# Patient Record
Sex: MALE | URBAN - METROPOLITAN AREA
[De-identification: names, ages, dates, MRNs, and addresses within clinical notes are randomized per-mention and may not be internally consistent; named-entity substitution may affect disease eponyms.]

---

## 2017-02-17 ENCOUNTER — EMERGENCY (EMERGENCY)
Facility: HOSPITAL | Age: 77
LOS: 1 days | Discharge: HOME | End: 2017-02-17
Attending: EMERGENCY MEDICINE | Admitting: EMERGENCY MEDICINE
Payer: MEDICARE

## 2017-02-17 VITALS
SYSTOLIC BLOOD PRESSURE: 125 MMHG | HEART RATE: 53 BPM | TEMPERATURE: 98 F | RESPIRATION RATE: 16 BRPM | DIASTOLIC BLOOD PRESSURE: 50 MMHG | OXYGEN SATURATION: 99 %

## 2017-02-17 VITALS
RESPIRATION RATE: 16 BRPM | DIASTOLIC BLOOD PRESSURE: 60 MMHG | OXYGEN SATURATION: 100 % | SYSTOLIC BLOOD PRESSURE: 155 MMHG | HEART RATE: 60 BPM

## 2017-02-17 DIAGNOSIS — Z98.89 OTHER SPECIFIED POSTPROCEDURAL STATES: Chronic | ICD-10-CM

## 2017-02-17 LAB
ALBUMIN SERPL ELPH-MCNC: 3.3 G/DL — SIGNIFICANT CHANGE UP (ref 3.3–5)
ALP SERPL-CCNC: 34 U/L — LOW (ref 40–120)
ALT FLD-CCNC: 6 U/L — SIGNIFICANT CHANGE UP (ref 4–41)
APTT BLD: 32.7 SEC — SIGNIFICANT CHANGE UP (ref 27.5–37.4)
AST SERPL-CCNC: 16 U/L — SIGNIFICANT CHANGE UP (ref 4–40)
BASOPHILS # BLD AUTO: 0.04 K/UL — SIGNIFICANT CHANGE UP (ref 0–0.2)
BASOPHILS NFR BLD AUTO: 0.7 % — SIGNIFICANT CHANGE UP (ref 0–2)
BILIRUB SERPL-MCNC: 0.5 MG/DL — SIGNIFICANT CHANGE UP (ref 0.2–1.2)
BUN SERPL-MCNC: 28 MG/DL — HIGH (ref 7–23)
CALCIUM SERPL-MCNC: 8 MG/DL — LOW (ref 8.4–10.5)
CHLORIDE SERPL-SCNC: 98 MMOL/L — SIGNIFICANT CHANGE UP (ref 98–107)
CO2 SERPL-SCNC: 26 MMOL/L — SIGNIFICANT CHANGE UP (ref 22–31)
CREAT SERPL-MCNC: 5.45 MG/DL — HIGH (ref 0.5–1.3)
EOSINOPHIL # BLD AUTO: 0.21 K/UL — SIGNIFICANT CHANGE UP (ref 0–0.5)
EOSINOPHIL NFR BLD AUTO: 3.5 % — SIGNIFICANT CHANGE UP (ref 0–6)
GLUCOSE SERPL-MCNC: 170 MG/DL — HIGH (ref 70–99)
HCT VFR BLD CALC: 34.3 % — LOW (ref 39–50)
HGB BLD-MCNC: 11 G/DL — LOW (ref 13–17)
IMM GRANULOCYTES NFR BLD AUTO: 0 % — SIGNIFICANT CHANGE UP (ref 0–1.5)
INR BLD: 1.14 — SIGNIFICANT CHANGE UP (ref 0.87–1.18)
LYMPHOCYTES # BLD AUTO: 2.04 K/UL — SIGNIFICANT CHANGE UP (ref 1–3.3)
LYMPHOCYTES # BLD AUTO: 34 % — SIGNIFICANT CHANGE UP (ref 13–44)
MCHC RBC-ENTMCNC: 29.5 PG — SIGNIFICANT CHANGE UP (ref 27–34)
MCHC RBC-ENTMCNC: 32.1 % — SIGNIFICANT CHANGE UP (ref 32–36)
MCV RBC AUTO: 92 FL — SIGNIFICANT CHANGE UP (ref 80–100)
MONOCYTES # BLD AUTO: 0.51 K/UL — SIGNIFICANT CHANGE UP (ref 0–0.9)
MONOCYTES NFR BLD AUTO: 8.5 % — SIGNIFICANT CHANGE UP (ref 2–14)
NEUTROPHILS # BLD AUTO: 3.2 K/UL — SIGNIFICANT CHANGE UP (ref 1.8–7.4)
NEUTROPHILS NFR BLD AUTO: 53.3 % — SIGNIFICANT CHANGE UP (ref 43–77)
PLATELET # BLD AUTO: 100 K/UL — LOW (ref 150–400)
PMV BLD: 10.4 FL — SIGNIFICANT CHANGE UP (ref 7–13)
POTASSIUM SERPL-MCNC: 4.3 MMOL/L — SIGNIFICANT CHANGE UP (ref 3.5–5.3)
POTASSIUM SERPL-SCNC: 4.3 MMOL/L — SIGNIFICANT CHANGE UP (ref 3.5–5.3)
PROT SERPL-MCNC: 6.5 G/DL — SIGNIFICANT CHANGE UP (ref 6–8.3)
PROTHROM AB SERPL-ACNC: 13 SEC — SIGNIFICANT CHANGE UP (ref 10–13.1)
RBC # BLD: 3.73 M/UL — LOW (ref 4.2–5.8)
RBC # FLD: 14 % — SIGNIFICANT CHANGE UP (ref 10.3–14.5)
SODIUM SERPL-SCNC: 140 MMOL/L — SIGNIFICANT CHANGE UP (ref 135–145)
WBC # BLD: 6 K/UL — SIGNIFICANT CHANGE UP (ref 3.8–10.5)
WBC # FLD AUTO: 6 K/UL — SIGNIFICANT CHANGE UP (ref 3.8–10.5)

## 2017-02-17 PROCEDURE — 71010: CPT | Mod: 26

## 2017-02-17 PROCEDURE — 99284 EMERGENCY DEPT VISIT MOD MDM: CPT

## 2017-02-17 PROCEDURE — 74177 CT ABD & PELVIS W/CONTRAST: CPT | Mod: 26

## 2017-02-17 NOTE — ED PROVIDER NOTE - OBJECTIVE STATEMENT
77yo male pmh ESRD on HD (T,TH,Fr), HTN, DM p/w high BP SBP 260s at home, improved after clonidine 0.2mg. C/o vomiting 75yo male pmh CAD, ESRD on HD (T,TH,Fr), HTN, DM p/w high BP SBP 260s at home, improved after clonidine 0.2mg. C/o postprandial emesis x 1 week, cannot tolerate PO. Weight loss, weakness, inability to ambulate x months. Facial swelling, left hand swelling x weeks. Denies fevers, LOC, headache, vision changes, CP, dyspnea, abd pain, diarrhea, dysuria. Last BM 2 days prior.

## 2017-02-17 NOTE — ED ADULT NURSE REASSESSMENT NOTE - NS ED NURSE REASSESS COMMENT FT1
Pt DC'd by MD, in NAD, denies abd pain/n/v or other complaint. Results reviewed w/ pt by MD, advisd for PMD follow up. IVL dc'd intact by RN. S/w set up [facility] transport back to home p/u around 1230. Pt in NAD

## 2017-02-17 NOTE — ED ADULT TRIAGE NOTE - CHIEF COMPLAINT QUOTE
Pt  s/p dialysis yesterday c/o high BP ,  B/L LE, facial swelling and B/L arm swelling.  /58.  Denies chest pain, sob, dizziness

## 2017-02-17 NOTE — ED PROVIDER NOTE - PSH
AVF (arteriovenous fistula)    H/O foot surgery  sec to ankle fracture  S/P cataract extraction    S/P cholecystectomy    S/P coronary artery stent placement

## 2017-02-17 NOTE — ED PROVIDER NOTE - PMH
CAD (coronary artery disease)  s/p 1 stent  DM (diabetes mellitus)    ESRD (end stage renal disease)    HLD (hyperlipidemia)    HTN (hypertension)    PVD (peripheral vascular disease)

## 2017-02-17 NOTE — ED PROVIDER NOTE - PROGRESS NOTE DETAILS
Pending CT. Pt will need to be admitted to Dr. Mendoza for failure to thrive if CT is negative for SBO. Patient reassessed and abd soft w/o tenderness or rebound or gurading.

## 2017-02-17 NOTE — ED PROVIDER NOTE - MEDICAL DECISION MAKING DETAILS
76M pmh DM, HTN, CAD, ESRD on HD p/w weight loss, facial swelling, weakness, high BP now resolved, cachexia, inability to tolerate PO. Ct abd/pelvis IV and PO contrast for possible mass causing SBO (constipation, hx of abd surgery, cachexia). According to Dr. Romano, a nephrologist covering for the patient, pt has been offered palliative but has refused. Labs appear to show hemoconcentration.

## 2017-02-17 NOTE — ED PROVIDER NOTE - ENMT, MLM
Airway patent, Nasal mucosa clear. Mouth with slightly dry mucosa. Throat has no vesicles, no oropharyngeal exudates and uvula is midline.

## 2017-02-17 NOTE — ED ADULT NURSE NOTE - OBJECTIVE STATEMENT
Pt received to room 9, Aox4 and nonambulatory at baseline. Patient c/o high BP this AM in 200's/100's. BP now is 167/61. Patient HR = 53 sinus rhythm on cardiac monitor. Patient is c/o some left sided edema to arm and leg as well, warm dry and no redness seen, 1+ pitting edema. Patient is a dialysis patient, gets dialysis T, TH, Sat. IV 22 gauge started in right wrist, labs drawn and sent, will continue to monitor.

## 2017-09-08 ENCOUNTER — INPATIENT (INPATIENT)
Facility: HOSPITAL | Age: 77
LOS: 3 days | Discharge: HOME | End: 2017-09-12
Attending: HOSPITALIST | Admitting: HOSPITALIST
Payer: MEDICARE

## 2017-09-08 DIAGNOSIS — Z98.89 OTHER SPECIFIED POSTPROCEDURAL STATES: Chronic | ICD-10-CM

## 2017-09-08 PROCEDURE — 70450 CT HEAD/BRAIN W/O DYE: CPT | Mod: 26

## 2017-09-08 PROCEDURE — 99285 EMERGENCY DEPT VISIT HI MDM: CPT | Mod: 25

## 2017-09-09 VITALS
HEART RATE: 57 BPM | OXYGEN SATURATION: 100 % | WEIGHT: 119.49 LBS | SYSTOLIC BLOOD PRESSURE: 103 MMHG | RESPIRATION RATE: 18 BRPM | DIASTOLIC BLOOD PRESSURE: 50 MMHG

## 2017-09-09 LAB
ALBUMIN SERPL ELPH-MCNC: 3.2 G/DL — LOW (ref 3.3–5)
ALBUMIN SERPL ELPH-MCNC: 3.4 G/DL — SIGNIFICANT CHANGE UP (ref 3.3–5)
ALP SERPL-CCNC: 52 U/L — SIGNIFICANT CHANGE UP (ref 40–120)
ALP SERPL-CCNC: 57 U/L — SIGNIFICANT CHANGE UP (ref 40–120)
ALT FLD-CCNC: 14 U/L — SIGNIFICANT CHANGE UP (ref 12–78)
ALT FLD-CCNC: 16 U/L — SIGNIFICANT CHANGE UP (ref 12–78)
ANION GAP SERPL CALC-SCNC: 11 MMOL/L — SIGNIFICANT CHANGE UP (ref 5–17)
ANION GAP SERPL CALC-SCNC: 13 MMOL/L — SIGNIFICANT CHANGE UP (ref 5–17)
APPEARANCE UR: CLEAR — SIGNIFICANT CHANGE UP
APTT BLD: 30.1 SEC — SIGNIFICANT CHANGE UP (ref 27.5–37.4)
AST SERPL-CCNC: 14 U/L — LOW (ref 15–37)
AST SERPL-CCNC: 35 U/L — SIGNIFICANT CHANGE UP (ref 15–37)
BACTERIA # UR AUTO: ABNORMAL
BASOPHILS # BLD AUTO: 0.1 K/UL — SIGNIFICANT CHANGE UP (ref 0–0.2)
BASOPHILS NFR BLD AUTO: 1.4 % — SIGNIFICANT CHANGE UP (ref 0–2)
BILIRUB SERPL-MCNC: 0.4 MG/DL — SIGNIFICANT CHANGE UP (ref 0.2–1.2)
BILIRUB SERPL-MCNC: 0.5 MG/DL — SIGNIFICANT CHANGE UP (ref 0.2–1.2)
BILIRUB UR-MCNC: NEGATIVE — SIGNIFICANT CHANGE UP
BUN SERPL-MCNC: 63 MG/DL — HIGH (ref 7–23)
BUN SERPL-MCNC: 64 MG/DL — HIGH (ref 7–23)
CALCIUM SERPL-MCNC: 7.3 MG/DL — LOW (ref 8.5–10.1)
CALCIUM SERPL-MCNC: 7.7 MG/DL — LOW (ref 8.5–10.1)
CHLORIDE SERPL-SCNC: 103 MMOL/L — SIGNIFICANT CHANGE UP (ref 96–108)
CHLORIDE SERPL-SCNC: 104 MMOL/L — SIGNIFICANT CHANGE UP (ref 96–108)
CK MB BLD-MCNC: 1.5 % — SIGNIFICANT CHANGE UP (ref 0–3.5)
CK MB BLD-MCNC: 3 % — SIGNIFICANT CHANGE UP (ref 0–3.5)
CK MB CFR SERPL CALC: 1.8 NG/ML — SIGNIFICANT CHANGE UP (ref 0.5–3.6)
CK MB CFR SERPL CALC: 2.2 NG/ML — SIGNIFICANT CHANGE UP (ref 0.5–3.6)
CK SERPL-CCNC: 119 U/L — SIGNIFICANT CHANGE UP (ref 26–308)
CK SERPL-CCNC: 73 U/L — SIGNIFICANT CHANGE UP (ref 26–308)
CO2 SERPL-SCNC: 24 MMOL/L — SIGNIFICANT CHANGE UP (ref 22–31)
CO2 SERPL-SCNC: 26 MMOL/L — SIGNIFICANT CHANGE UP (ref 22–31)
COLOR SPEC: YELLOW — SIGNIFICANT CHANGE UP
CREAT SERPL-MCNC: 10.4 MG/DL — HIGH (ref 0.5–1.3)
CREAT SERPL-MCNC: 10.5 MG/DL — HIGH (ref 0.5–1.3)
DIFF PNL FLD: ABNORMAL
EOSINOPHIL # BLD AUTO: 0.2 K/UL — SIGNIFICANT CHANGE UP (ref 0–0.5)
EOSINOPHIL NFR BLD AUTO: 3.1 % — SIGNIFICANT CHANGE UP (ref 0–6)
EPI CELLS # UR: SIGNIFICANT CHANGE UP
GLUCOSE SERPL-MCNC: 194 MG/DL — HIGH (ref 70–99)
GLUCOSE SERPL-MCNC: 317 MG/DL — HIGH (ref 70–99)
GLUCOSE UR QL: 250 MG/DL
HAV IGM SER-ACNC: SIGNIFICANT CHANGE UP
HBV CORE IGM SER-ACNC: SIGNIFICANT CHANGE UP
HBV SURFACE AB SER-ACNC: >1000 MIU/ML — SIGNIFICANT CHANGE UP
HBV SURFACE AG SER-ACNC: SIGNIFICANT CHANGE UP
HCT VFR BLD CALC: 31 % — LOW (ref 39–50)
HCT VFR BLD CALC: 36.2 % — LOW (ref 39–50)
HCV AB S/CO SERPL IA: 0.15 S/CO — SIGNIFICANT CHANGE UP
HCV AB SERPL-IMP: SIGNIFICANT CHANGE UP
HGB BLD-MCNC: 10.7 G/DL — LOW (ref 13–17)
HGB BLD-MCNC: 11.8 G/DL — LOW (ref 13–17)
INR BLD: 1.08 RATIO — SIGNIFICANT CHANGE UP (ref 0.88–1.16)
IRON SATN MFR SERPL: 19 % — SIGNIFICANT CHANGE UP (ref 16–55)
IRON SATN MFR SERPL: 28 UG/DL — LOW (ref 45–165)
KETONES UR-MCNC: NEGATIVE — SIGNIFICANT CHANGE UP
LACTATE SERPL-SCNC: 2.6 MMOL/L — HIGH (ref 0.7–2)
LEUKOCYTE ESTERASE UR-ACNC: ABNORMAL
LYMPHOCYTES # BLD AUTO: 1.6 K/UL — SIGNIFICANT CHANGE UP (ref 1–3.3)
LYMPHOCYTES # BLD AUTO: 24.6 % — SIGNIFICANT CHANGE UP (ref 13–44)
MAGNESIUM SERPL-MCNC: 2.7 MG/DL — HIGH (ref 1.6–2.6)
MCHC RBC-ENTMCNC: 31.7 PG — SIGNIFICANT CHANGE UP (ref 27–34)
MCHC RBC-ENTMCNC: 32.6 GM/DL — SIGNIFICANT CHANGE UP (ref 32–36)
MCHC RBC-ENTMCNC: 32.6 PG — SIGNIFICANT CHANGE UP (ref 27–34)
MCHC RBC-ENTMCNC: 34.6 GM/DL — SIGNIFICANT CHANGE UP (ref 32–36)
MCV RBC AUTO: 94.3 FL — SIGNIFICANT CHANGE UP (ref 80–100)
MCV RBC AUTO: 97.2 FL — SIGNIFICANT CHANGE UP (ref 80–100)
MONOCYTES # BLD AUTO: 0.5 K/UL — SIGNIFICANT CHANGE UP (ref 0–0.9)
MONOCYTES NFR BLD AUTO: 7.7 % — SIGNIFICANT CHANGE UP (ref 2–14)
NEUTROPHILS # BLD AUTO: 4 K/UL — SIGNIFICANT CHANGE UP (ref 1.8–7.4)
NEUTROPHILS NFR BLD AUTO: 63.1 % — SIGNIFICANT CHANGE UP (ref 43–77)
NITRITE UR-MCNC: NEGATIVE — SIGNIFICANT CHANGE UP
PH UR: 9 — HIGH (ref 5–8)
PHOSPHATE SERPL-MCNC: 3.7 MG/DL — SIGNIFICANT CHANGE UP (ref 2.5–4.5)
PLATELET # BLD AUTO: 136 K/UL — LOW (ref 150–400)
PLATELET # BLD AUTO: 136 K/UL — LOW (ref 150–400)
POTASSIUM SERPL-MCNC: 4.5 MMOL/L — SIGNIFICANT CHANGE UP (ref 3.5–5.3)
POTASSIUM SERPL-MCNC: 6.6 MMOL/L — CRITICAL HIGH (ref 3.5–5.3)
POTASSIUM SERPL-SCNC: 4.5 MMOL/L — SIGNIFICANT CHANGE UP (ref 3.5–5.3)
POTASSIUM SERPL-SCNC: 6.6 MMOL/L — CRITICAL HIGH (ref 3.5–5.3)
PROT SERPL-MCNC: 7.2 GM/DL — SIGNIFICANT CHANGE UP (ref 6–8.3)
PROT SERPL-MCNC: 8.1 GM/DL — SIGNIFICANT CHANGE UP (ref 6–8.3)
PROT UR-MCNC: 100 MG/DL
PROTHROM AB SERPL-ACNC: 11.8 SEC — SIGNIFICANT CHANGE UP (ref 9.8–12.7)
RBC # BLD: 3.28 M/UL — LOW (ref 4.2–5.8)
RBC # BLD: 3.72 M/UL — LOW (ref 4.2–5.8)
RBC # FLD: 12.7 % — SIGNIFICANT CHANGE UP (ref 11–15)
RBC # FLD: 12.9 % — SIGNIFICANT CHANGE UP (ref 11–15)
RBC CASTS # UR COMP ASSIST: SIGNIFICANT CHANGE UP /HPF (ref 0–4)
SODIUM SERPL-SCNC: 140 MMOL/L — SIGNIFICANT CHANGE UP (ref 135–145)
SODIUM SERPL-SCNC: 141 MMOL/L — SIGNIFICANT CHANGE UP (ref 135–145)
SP GR SPEC: 1.01 — SIGNIFICANT CHANGE UP (ref 1.01–1.02)
TIBC SERPL-MCNC: 146 UG/DL — LOW (ref 220–430)
TROPONIN I SERPL-MCNC: 0.02 NG/ML — SIGNIFICANT CHANGE UP (ref 0.01–0.04)
TROPONIN I SERPL-MCNC: 0.03 NG/ML — SIGNIFICANT CHANGE UP (ref 0.01–0.04)
UIBC SERPL-MCNC: 118 UG/DL — SIGNIFICANT CHANGE UP (ref 110–370)
UROBILINOGEN FLD QL: NEGATIVE MG/DL — SIGNIFICANT CHANGE UP
WBC # BLD: 6.3 K/UL — SIGNIFICANT CHANGE UP (ref 3.8–10.5)
WBC # BLD: 7.5 K/UL — SIGNIFICANT CHANGE UP (ref 3.8–10.5)
WBC # FLD AUTO: 6.3 K/UL — SIGNIFICANT CHANGE UP (ref 3.8–10.5)
WBC # FLD AUTO: 7.5 K/UL — SIGNIFICANT CHANGE UP (ref 3.8–10.5)
WBC UR QL: SIGNIFICANT CHANGE UP

## 2017-09-09 PROCEDURE — 70450 CT HEAD/BRAIN W/O DYE: CPT | Mod: 26

## 2017-09-09 PROCEDURE — 93010 ELECTROCARDIOGRAM REPORT: CPT

## 2017-09-09 RX ORDER — NICARDIPINE HYDROCHLORIDE 30 MG/1
5 CAPSULE, EXTENDED RELEASE ORAL
Qty: 50 | Refills: 0 | Status: DISCONTINUED | OUTPATIENT
Start: 2017-09-09 | End: 2017-09-09

## 2017-09-09 RX ORDER — NICARDIPINE HYDROCHLORIDE 30 MG/1
5 CAPSULE, EXTENDED RELEASE ORAL
Qty: 40 | Refills: 0 | Status: DISCONTINUED | OUTPATIENT
Start: 2017-09-09 | End: 2017-09-09

## 2017-09-09 RX ORDER — DOCUSATE SODIUM 100 MG
100 CAPSULE ORAL THREE TIMES A DAY
Qty: 0 | Refills: 0 | Status: DISCONTINUED | OUTPATIENT
Start: 2017-09-09 | End: 2017-09-12

## 2017-09-09 RX ORDER — METOPROLOL TARTRATE 50 MG
50 TABLET ORAL
Qty: 0 | Refills: 0 | Status: DISCONTINUED | OUTPATIENT
Start: 2017-09-09 | End: 2017-09-09

## 2017-09-09 RX ORDER — SODIUM CHLORIDE 9 MG/ML
1000 INJECTION, SOLUTION INTRAVENOUS
Qty: 0 | Refills: 0 | Status: DISCONTINUED | OUTPATIENT
Start: 2017-09-09 | End: 2017-09-12

## 2017-09-09 RX ORDER — NIFEDIPINE 30 MG
60 TABLET, EXTENDED RELEASE 24 HR ORAL DAILY
Qty: 0 | Refills: 0 | Status: DISCONTINUED | OUTPATIENT
Start: 2017-09-09 | End: 2017-09-12

## 2017-09-09 RX ORDER — INSULIN LISPRO 100/ML
2 VIAL (ML) SUBCUTANEOUS ONCE
Qty: 0 | Refills: 0 | Status: COMPLETED | OUTPATIENT
Start: 2017-09-09 | End: 2017-09-09

## 2017-09-09 RX ORDER — ALBUMIN HUMAN 25 %
100 VIAL (ML) INTRAVENOUS ONCE
Qty: 0 | Refills: 0 | Status: COMPLETED | OUTPATIENT
Start: 2017-09-09 | End: 2017-09-09

## 2017-09-09 RX ORDER — DEXTROSE 50 % IN WATER 50 %
1 SYRINGE (ML) INTRAVENOUS ONCE
Qty: 0 | Refills: 0 | Status: DISCONTINUED | OUTPATIENT
Start: 2017-09-09 | End: 2017-09-12

## 2017-09-09 RX ORDER — DEXTROSE 50 % IN WATER 50 %
12.5 SYRINGE (ML) INTRAVENOUS ONCE
Qty: 0 | Refills: 0 | Status: DISCONTINUED | OUTPATIENT
Start: 2017-09-09 | End: 2017-09-12

## 2017-09-09 RX ORDER — ACETAMINOPHEN 500 MG
650 TABLET ORAL EVERY 6 HOURS
Qty: 0 | Refills: 0 | Status: DISCONTINUED | OUTPATIENT
Start: 2017-09-09 | End: 2017-09-12

## 2017-09-09 RX ORDER — NIFEDIPINE 30 MG
60 TABLET, EXTENDED RELEASE 24 HR ORAL DAILY
Qty: 0 | Refills: 0 | Status: DISCONTINUED | OUTPATIENT
Start: 2017-09-09 | End: 2017-09-09

## 2017-09-09 RX ORDER — GLUCAGON INJECTION, SOLUTION 0.5 MG/.1ML
1 INJECTION, SOLUTION SUBCUTANEOUS ONCE
Qty: 0 | Refills: 0 | Status: DISCONTINUED | OUTPATIENT
Start: 2017-09-09 | End: 2017-09-12

## 2017-09-09 RX ORDER — METOPROLOL TARTRATE 50 MG
50 TABLET ORAL
Qty: 0 | Refills: 0 | Status: DISCONTINUED | OUTPATIENT
Start: 2017-09-09 | End: 2017-09-10

## 2017-09-09 RX ORDER — DEXTROSE 50 % IN WATER 50 %
25 SYRINGE (ML) INTRAVENOUS ONCE
Qty: 0 | Refills: 0 | Status: DISCONTINUED | OUTPATIENT
Start: 2017-09-09 | End: 2017-09-12

## 2017-09-09 RX ORDER — INSULIN LISPRO 100/ML
VIAL (ML) SUBCUTANEOUS
Qty: 0 | Refills: 0 | Status: DISCONTINUED | OUTPATIENT
Start: 2017-09-09 | End: 2017-09-12

## 2017-09-09 RX ORDER — HYDRALAZINE HCL 50 MG
10 TABLET ORAL EVERY 4 HOURS
Qty: 0 | Refills: 0 | Status: DISCONTINUED | OUTPATIENT
Start: 2017-09-09 | End: 2017-09-12

## 2017-09-09 RX ADMIN — Medication 100 MILLIGRAM(S): at 06:00

## 2017-09-09 RX ADMIN — Medication 10 MILLIGRAM(S): at 06:25

## 2017-09-09 RX ADMIN — Medication 0.1 MILLIGRAM(S): at 03:30

## 2017-09-09 RX ADMIN — Medication 10 MILLIGRAM(S): at 21:46

## 2017-09-09 RX ADMIN — Medication 0.1 MILLIGRAM(S): at 06:00

## 2017-09-09 RX ADMIN — Medication 50 MILLIGRAM(S): at 06:00

## 2017-09-09 RX ADMIN — Medication 50 MILLIGRAM(S): at 17:25

## 2017-09-09 RX ADMIN — Medication 100 MILLIGRAM(S): at 16:23

## 2017-09-09 RX ADMIN — Medication 2 UNIT(S): at 06:25

## 2017-09-09 RX ADMIN — Medication 50 MILLILITER(S): at 14:26

## 2017-09-09 RX ADMIN — Medication 0.1 MILLIGRAM(S): at 17:25

## 2017-09-09 RX ADMIN — Medication 100 MILLIGRAM(S): at 22:38

## 2017-09-09 NOTE — CONSULT NOTE ADULT - SUBJECTIVE AND OBJECTIVE BOX
Patient is a 76y old  Male who presents with a chief complaint of HTN; weakenss  HPI:  Patient with HTN possible CVA vs hemorrhage; hyperkalemia; ESRD on HD      PAST MEDICAL & SURGICAL HISTORY:  ESRD, HTN  FAMILY HISTORY:    No Known Allergies    MEDICATIONS  (STANDING):  insulin lispro Injectable (HumaLOG) 2 Unit(s) SubCutaneous once  insulin lispro (HumaLOG) corrective regimen sliding scale   SubCutaneous three times a day before meals  dextrose 5%. 1000 milliLiter(s) (50 mL/Hr) IV Continuous <Continuous>  dextrose 50% Injectable 12.5 Gram(s) IV Push once  dextrose 50% Injectable 25 Gram(s) IV Push once  dextrose 50% Injectable 25 Gram(s) IV Push once  docusate sodium 100 milliGRAM(s) Oral three times a day  metoprolol 50 milliGRAM(s) Oral two times a day  NIFEdipine XL 60 milliGRAM(s) Oral daily  cloNIDine 0.1 milliGRAM(s) Oral two times a day  cloNIDine 0.1 milliGRAM(s) Oral once  albumin human 25% IVPB 100 milliLiter(s) IV Intermittent once    MEDICATIONS  (PRN):  dextrose Gel 1 Dose(s) Oral once PRN Blood Glucose LESS THAN 70 milliGRAM(s)/deciliter  glucagon  Injectable 1 milliGRAM(s) IntraMuscular once PRN Glucose LESS THAN 70 milligrams/deciliter  hydrALAZINE Injectable 10 milliGRAM(s) IV Push every 4 hours PRN SBP>160    Vital Signs Last 24 Hrs  T(C): 36.6 (09 Sep 2017 12:00), Max: 36.6 (09 Sep 2017 12:00)  T(F): 97.8 (09 Sep 2017 12:00), Max: 97.8 (09 Sep 2017 12:00)  HR: 60 (09 Sep 2017 12:30) (56 - 60)  BP: 105/52 (09 Sep 2017 12:30) (103/50 - 113/50)  BP(mean): 64 (09 Sep 2017 12:30) (64 - 66)  RR: 16 (09 Sep 2017 12:30) (16 - 18)  SpO2: 100% (09 Sep 2017 12:30) (100% - 100%)    CAPILLARY BLOOD GLUCOSE  126 (09 Sep 2017 12:33)        PHYSICAL EXAM:      T(C): 36.6 (09 Sep 2017 12:00), Max: 36.6 (09 Sep 2017 12:00)  HR: 60 (09 Sep 2017 12:30) (56 - 60)  BP: 105/52 (09 Sep 2017 12:30) (103/50 - 113/50)  RR: 16 (09 Sep 2017 12:30) (16 - 18)  SpO2: 100% (09 Sep 2017 12:30) (100% - 100%)  Wt(kg): --  Respiratory: clear anteriorly, decreased BS at bases  Cardiovascular: S1 S2  Gastrointestinal: soft NT ND +BS  Extremities:   1 edema                  141  |  104  |  64<H>  ----------------------------<  194<H>  4.5   |  24  |  10.50<H>    Ca    7.7<L>      09 Sep 2017 02:55  Phos  3.7       Mg     2.7         TPro  7.2  /  Alb  3.2<L>  /  TBili  0.4  /  DBili  x   /  AST  14<L>  /  ALT  14  /  AlkPhos  52                            10.7   7.5   )-----------( 136      ( 09 Sep 2017 02:54 )             31.0     Urinalysis Basic - ( 08 Sep 2017 22:51 )    Color: Yellow / Appearance: Clear / S.015 / pH: x  Gluc: x / Ketone: Negative  / Bili: Negative / Urobili: Negative mg/dL   Blood: x / Protein: 100 mg/dL / Nitrite: Negative   Leuk Esterase: Trace / RBC: 0-2 /HPF / WBC 0-2   Sq Epi: x / Non Sq Epi: x / Bacteria: Few            Assessment and Plan    ESRD, HTN crisis, possible cerebral bleed.  HD for today; will attempt to keep SBP >150 during treatment.  UF as tolerated; slow titration of BP medications.  Will follow.

## 2017-09-10 LAB
ANION GAP SERPL CALC-SCNC: 8 MMOL/L — SIGNIFICANT CHANGE UP (ref 5–17)
BUN SERPL-MCNC: 21 MG/DL — SIGNIFICANT CHANGE UP (ref 7–23)
CALCIUM SERPL-MCNC: 7.5 MG/DL — LOW (ref 8.5–10.1)
CHLORIDE SERPL-SCNC: 108 MMOL/L — SIGNIFICANT CHANGE UP (ref 96–108)
CO2 SERPL-SCNC: 28 MMOL/L — SIGNIFICANT CHANGE UP (ref 22–31)
CREAT SERPL-MCNC: 5.22 MG/DL — HIGH (ref 0.5–1.3)
CULTURE RESULTS: NO GROWTH — SIGNIFICANT CHANGE UP
GLUCOSE SERPL-MCNC: 224 MG/DL — HIGH (ref 70–99)
HCT VFR BLD CALC: 29.9 % — LOW (ref 39–50)
HGB BLD-MCNC: 9.9 G/DL — LOW (ref 13–17)
MCHC RBC-ENTMCNC: 32.6 PG — SIGNIFICANT CHANGE UP (ref 27–34)
MCHC RBC-ENTMCNC: 32.9 GM/DL — SIGNIFICANT CHANGE UP (ref 32–36)
MCV RBC AUTO: 98.9 FL — SIGNIFICANT CHANGE UP (ref 80–100)
PLATELET # BLD AUTO: 115 K/UL — LOW (ref 150–400)
POTASSIUM SERPL-MCNC: 5.6 MMOL/L — HIGH (ref 3.5–5.3)
POTASSIUM SERPL-SCNC: 5.6 MMOL/L — HIGH (ref 3.5–5.3)
RBC # BLD: 3.03 M/UL — LOW (ref 4.2–5.8)
RBC # FLD: 13.5 % — SIGNIFICANT CHANGE UP (ref 11–15)
SODIUM SERPL-SCNC: 144 MMOL/L — SIGNIFICANT CHANGE UP (ref 135–145)
SPECIMEN SOURCE: SIGNIFICANT CHANGE UP
WBC # BLD: 5.7 K/UL — SIGNIFICANT CHANGE UP (ref 3.8–10.5)
WBC # FLD AUTO: 5.7 K/UL — SIGNIFICANT CHANGE UP (ref 3.8–10.5)

## 2017-09-10 RX ORDER — METOPROLOL TARTRATE 50 MG
100 TABLET ORAL EVERY 12 HOURS
Qty: 0 | Refills: 0 | Status: DISCONTINUED | OUTPATIENT
Start: 2017-09-10 | End: 2017-09-12

## 2017-09-10 RX ORDER — HYDRALAZINE HCL 50 MG
50 TABLET ORAL THREE TIMES A DAY
Qty: 0 | Refills: 0 | Status: DISCONTINUED | OUTPATIENT
Start: 2017-09-10 | End: 2017-09-12

## 2017-09-10 RX ADMIN — Medication 10 MILLIGRAM(S): at 09:42

## 2017-09-10 RX ADMIN — Medication 100 MILLIGRAM(S): at 21:39

## 2017-09-10 RX ADMIN — Medication 100 MILLIGRAM(S): at 15:19

## 2017-09-10 RX ADMIN — Medication 2: at 12:16

## 2017-09-10 RX ADMIN — Medication 0.1 MILLIGRAM(S): at 06:56

## 2017-09-10 RX ADMIN — Medication 1 PATCH: at 13:45

## 2017-09-10 RX ADMIN — Medication 60 MILLIGRAM(S): at 06:55

## 2017-09-10 RX ADMIN — Medication 100 MILLIGRAM(S): at 06:56

## 2017-09-10 RX ADMIN — Medication 10 MILLIGRAM(S): at 15:04

## 2017-09-10 RX ADMIN — Medication 50 MILLIGRAM(S): at 16:45

## 2017-09-10 RX ADMIN — Medication 50 MILLIGRAM(S): at 06:55

## 2017-09-10 RX ADMIN — Medication 50 MILLIGRAM(S): at 21:39

## 2017-09-10 RX ADMIN — Medication 2: at 09:36

## 2017-09-10 NOTE — PROGRESS NOTE ADULT - SUBJECTIVE AND OBJECTIVE BOX
Patient seen in follow up for ESRD; comfortable no distress.    MEDICATIONS  (STANDING):  insulin lispro (HumaLOG) corrective regimen sliding scale   SubCutaneous three times a day before meals  dextrose 5%. 1000 milliLiter(s) (50 mL/Hr) IV Continuous <Continuous>  dextrose 50% Injectable 12.5 Gram(s) IV Push once  dextrose 50% Injectable 25 Gram(s) IV Push once  dextrose 50% Injectable 25 Gram(s) IV Push once  docusate sodium 100 milliGRAM(s) Oral three times a day  NIFEdipine XL 60 milliGRAM(s) Oral daily  cloNIDine Patch 0.1 mG/24Hr(s) 1 patch Topical every 7 days  metoprolol 100 milliGRAM(s) Oral every 12 hours  hydrALAZINE 50 milliGRAM(s) Oral three times a day    MEDICATIONS  (PRN):  dextrose Gel 1 Dose(s) Oral once PRN Blood Glucose LESS THAN 70 milliGRAM(s)/deciliter  glucagon  Injectable 1 milliGRAM(s) IntraMuscular once PRN Glucose LESS THAN 70 milligrams/deciliter  hydrALAZINE Injectable 10 milliGRAM(s) IV Push every 4 hours PRN SBP>160  acetaminophen   Tablet 650 milliGRAM(s) Oral every 6 hours PRN For Temp greater than 38.5 C (101.3 F)    PHYSICAL EXAM:      T(C): 36.8 (09-10-17 @ 17:50), Max: 36.8 (09-10-17 @ 17:50)  HR: 64 (09-10-17 @ 18:30) (56 - 71)  BP: 116/53 (09-10-17 @ 18:30) (116/53 - 207/61)  RR: 16 (09-10-17 @ 18:30) (9 - 21)  SpO2: 100% (09-10-17 @ 18:30) (95% - 100%)  Wt(kg): --  Respiratory: clear anteriorly, decreased BS at bases  Cardiovascular: S1 S2  Gastrointestinal: soft NT ND +BS  Extremities:  tr edema  AVF LUE                                  9.9    5.7   )-----------( 115      ( 10 Sep 2017 03:55 )             29.9     09-10    144  |  108  |  21  ----------------------------<  224<H>  5.6<H>   |  28  |  5.22<H>    Ca    7.5<L>      10 Sep 2017 03:55  Phos  3.7     09-09  Mg     2.7     09-09    TPro  7.2  /  Alb  3.2<L>  /  TBili  0.4  /  DBili  x   /  AST  14<L>  /  ALT  14  /  AlkPhos  52  09-09      LIVER FUNCTIONS - ( 09 Sep 2017 02:55 )  Alb: 3.2 g/dL / Pro: 7.2 gm/dL / ALK PHOS: 52 U/L / ALT: 14 U/L / AST: 14 U/L / GGT: x             Assessment and Plan:  HTN crisis with ICH; ESRD  BP improved; Catapress patch added;  HD for Tuesday.  Will follow.

## 2017-09-10 NOTE — CONSULT NOTE ADULT - SUBJECTIVE AND OBJECTIVE BOX
Subjective Complaints:  Historian:       Consult requested by ER doctor:                  Attending:     HPI:  see paper H& P (10 Sep 2017 15:03)    MONTY GOSS    PAST MEDICAL & SURGICAL HISTORY:  76yMale    MEDICATIONS  (STANDING):  insulin lispro (HumaLOG) corrective regimen sliding scale   SubCutaneous three times a day before meals  dextrose 5%. 1000 milliLiter(s) (50 mL/Hr) IV Continuous <Continuous>  dextrose 50% Injectable 12.5 Gram(s) IV Push once  dextrose 50% Injectable 25 Gram(s) IV Push once  dextrose 50% Injectable 25 Gram(s) IV Push once  docusate sodium 100 milliGRAM(s) Oral three times a day  NIFEdipine XL 60 milliGRAM(s) Oral daily  cloNIDine Patch 0.1 mG/24Hr(s) 1 patch Topical every 7 days  metoprolol 100 milliGRAM(s) Oral every 12 hours  hydrALAZINE 50 milliGRAM(s) Oral three times a day    MEDICATIONS  (PRN):  dextrose Gel 1 Dose(s) Oral once PRN Blood Glucose LESS THAN 70 milliGRAM(s)/deciliter  glucagon  Injectable 1 milliGRAM(s) IntraMuscular once PRN Glucose LESS THAN 70 milligrams/deciliter  hydrALAZINE Injectable 10 milliGRAM(s) IV Push every 4 hours PRN SBP>160  acetaminophen   Tablet 650 milliGRAM(s) Oral every 6 hours PRN For Temp greater than 38.5 C (101.3 F)      Allergies    No Known Allergies    Intolerances      FAMILY HISTORY:    Vital Signs Last 24 Hrs  T(C): 36.6 (10 Sep 2017 07:03), Max: 37.7 (09 Sep 2017 19:13)  T(F): 97.9 (10 Sep 2017 07:03), Max: 99.8 (09 Sep 2017 19:13)  HR: 63 (10 Sep 2017 15:00) (56 - 71)  BP: 166/61 (10 Sep 2017 15:00) (150/59 - 206/68)  BP(mean): 90 (10 Sep 2017 15:00) (76 - 145)  RR: 10 (10 Sep 2017 15:00) (9 - 21)  SpO2: 100% (10 Sep 2017 15:00) (99% - 100%)    NEUROLOGICAL EXAM:  HENT:  Normocephalic head; atraumatic head.  Neck supple.  ENT: normal looking.  Mental State:    Alert.  Fully oriented to person, place and date.  Coherent.  Speech clear and intact.  Cooperative.  Responds appropriately.    Cranial Nerves:  II-XII:   Pupils round and reactive to light and accommodation.  Extraocular movements full.  Visual fields full (no homonymous hemianopsia).  Visual acuity wnl.  Facial symmetry intact.  Tongue midline.  Motor Functions:  Moves all extremities.  No pronator drift of UE.  Claps hand well.  Hand  intact bilaterally.  Ambulatory.    Sensory Functions:   Intact to touch and pinprick to face and extremities.    Reflexes:  Deep tendon reflexes normoactive to biceps, knees and ankles.  Babinski absent (present).  Cerebellar Testing:    Finger to nose intact.  Nystagmus absent.  Neurovascular: Carotid auscultation full without bruits.      LABS:                        9.9    5.7   )-----------( 115      ( 10 Sep 2017 03:55 )             29.9     09-10    144  |  108  |  21  ----------------------------<  224<H>  5.6<H>   |  28  |  5.22<H>    Ca    7.5<L>      10 Sep 2017 03:55  Phos  3.7       Mg     2.7         TPro  7.2  /  Alb  3.2<L>  /  TBili  0.4  /  DBili  x   /  AST  14<L>  /  ALT  14  /  AlkPhos  52      PT/INR - ( 08 Sep 2017 22:50 )   PT: 11.8 sec;   INR: 1.08 ratio         PTT - ( 08 Sep 2017 22:50 )  PTT:30.1 sec    Urinalysis Basic - ( 08 Sep 2017 22:51 )    Color: Yellow / Appearance: Clear / S.015 / pH: x  Gluc: x / Ketone: Negative  / Bili: Negative / Urobili: Negative mg/dL   Blood: x / Protein: 100 mg/dL / Nitrite: Negative   Leuk Esterase: Trace / RBC: 0-2 /HPF / WBC 0-2   Sq Epi: x / Non Sq Epi: x / Bacteria: Few        RADIOLOGY & ADDITIONAL STUDIES:    Diet, Consistent Carbohydrate Renal w/Evening Snack ( @ 17:23)  Chart Check ( @ 19:14)  Complete Blood Count: Routine (09-10 @ 03:33)  Basic Metabolic Panel: Routine (09-10 @ 03:33)  cloNIDine Patch 0.1 mG/24Hr(s): [Known as CATAPRES-TTS 1]  1 patch, Topical, every 7 days  Administration Instructions: external use only  This is a Look-alike/Sound-alike Medication  Provider's Contact #: [phone] (09-10 @ 11:01)  metoprolol: [Ordered as LOPRESSOR]  100 milliGRAM(s), Oral, every 12 hours  Provider's Contact #: [phone] (09-10 @ 13:27)  Transfer in Level of Care and or Service:     Transfer to Service: Telemetry    Additional Instructions:  d/w Dr. Hernandez (09-10 @ 14:32)  Transfer in Physician:     Transfer to Service: Telemetry    Transfer to Physician: Jung (09-10 @ 14:32)  Vital Signs:     Frequency:  every 4 hours (09-10 @ 14:32)  VTE Prophylaxis - No Pharmacological Prophylaxis Due To::     Time/Priority:  Routine    Reason For No Pharmacologic VTE Prophylaxis  History of cerebral (09-10 @ :33)  Intermittent Pneumatic Compression:     Body Side:  Bilateral (09-10 @ :33)  hydrALAZINE: [Known as APRESOLINE]  50 milliGRAM(s), Oral, three times a day  Special Instructions: hold for sbp < 100 or HR < 60  Administration Instructions: This is a Look-alike/Sound-alike Medication  Provider's Contact #: [phone] (09-10 @ 15:01)      Assessment & Opinion:    Recommendations:  Brain MRI.  Carotid doppler.  Echocardiogram.  EEG.   DVT prophylaxis as ordered.  Medications: HPI:    MONTY GOSS.  77 yo male..  HPI:  HTN ESRD a/w near syncope found to have a 6 mm L frontal ICH and SBPs of 260, initially on cardene gtt, repeat head Ct with stable 6mm ICH.  Mental status is unchanged.    Additional ER Note:  77 y/o male on 17 was in recliner, family noticed he stopped talking, was diaphoretic . Eyes open but unresponsive. Called 911. Patient awake when in the ambulence. PMHX: HTN ESRD. Patient was on HD three times week but ws not compliant as he wanted to start to go only twice weekly.  In ER found to have a 6 mm L frontal ICH and SBPs of 260, initially on Cardene gtt - now dc'd, repeat head Ct with stable 6mm ICH. mental status is unchanged.  Seen by neurology Dr. Kohler.  PT/OT ordered.  Nephology: Dr. Collado. Patient had HD on 17.  Blood pressure stabilized on clonidine, nifedipine lopressor and hydralazine.  EEG done this am.  Patient stable for transfer to telemetry.     Radiology:  Brain CT:  6 mm hyperdensity along the left frontal subcortical region may represent a microhemorrhage without associated edema. No prior head CTs are available for comparison. Follow-up recommended.  Left mastoid effusion. Swelling along the left ear and scalp, question otitis externa and mastoiditis.    MEDICATIONS  (STANDING):  insulin lispro (HumaLOG) corrective regimen sliding scale   SubCutaneous three times a day before meals  dextrose 5%. 1000 milliLiter(s) (50 mL/Hr) IV Continuous <Continuous>  dextrose 50% Injectable 12.5 Gram(s) IV Push once  dextrose 50% Injectable 25 Gram(s) IV Push once  dextrose 50% Injectable 25 Gram(s) IV Push once  docusate sodium 100 milliGRAM(s) Oral three times a day  NIFEdipine XL 60 milliGRAM(s) Oral daily  cloNIDine Patch 0.1 mG/24Hr(s) 1 patch Topical every 7 days  metoprolol 100 milliGRAM(s) Oral every 12 hours  hydrALAZINE 50 milliGRAM(s) Oral three times a day    MEDICATIONS  (PRN):  dextrose Gel 1 Dose(s) Oral once PRN Blood Glucose LESS THAN 70 milliGRAM(s)/deciliter  glucagon  Injectable 1 milliGRAM(s) IntraMuscular once PRN Glucose LESS THAN 70 milligrams/deciliter  hydrALAZINE Injectable 10 milliGRAM(s) IV Push every 4 hours PRN SBP>160  acetaminophen   Tablet 650 milliGRAM(s) Oral every 6 hours PRN For Temp greater than 38.5 C (101.3 F)  Allergies: No Known Allergies  Intolerances  FAMILY HISTORY:    Vital Signs Last 24 Hrs  T(C): 36.6 (10 Sep 2017 07:03), Max: 37.7 (09 Sep 2017 19:13)  T(F): 97.9 (10 Sep 2017 07:03), Max: 99.8 (09 Sep 2017 19:13)  HR: 63 (10 Sep 2017 15:00) (56 - 71)  BP: 166/61 (10 Sep 2017 15:00) (150/59 - 206/68)  BP(mean): 90 (10 Sep 2017 15:00) (76 - 145)  RR: 10 (10 Sep 2017 15:00) (9 - 21)  SpO2: 100% (10 Sep 2017 15:00) (99% - 100%)    NEUROLOGICAL EXAM:  HENT:  Normocephalic head; atraumatic head.  Neck supple.  ENT: normal looking.  Mental State:    Awake.  Fully oriented to person, place and date.  Coherent.  Speech clear and intact.  Cooperative.  Responds appropriately.    Cranial Nerves:  II-XII:   Pupils round and reactive to light and accommodation.  Extraocular movements full.  Visual fields full (no homonymous hemianopsia).  Visual acuity wnl.  Facial symmetry intact.  Tongue midline.  Motor Functions:  Moves all extremities.  No pronator drift of UE.  Claps hands well.    Sensory Functions:   Intact to touch and pinprick to face and extremities.    Reflexes:  Deep tendon reflexes normoactive to knees and ankles.    Cerebellar Testing:    Finger to nose intact.  Nystagmus absent.  Neurovascular: Carotid auscultation full without bruits.      LABS:                        9.9    5.7   )-----------( 115      ( 10 Sep 2017 03:55 )             29.9     09-10    144  |  108  |  21  ----------------------------<  224<H>  5.6<H>   |  28  |  5.22<H>    Ca    7.5<L>      10 Sep 2017 03:55  Phos  3.7       Mg     2.7         TPro  7.2  /  Alb  3.2<L>  /  TBili  0.4  /  DBili  x   /  AST  14<L>  /  ALT  14  /  AlkPhos  52      PT/INR - ( 08 Sep 2017 22:50 )   PT: 11.8 sec;   INR: 1.08 ratio       PTT - ( 08 Sep 2017 22:50 )  PTT:30.1 sec    Urinalysis Basic - ( 08 Sep 2017 22:51 )    Color: Yellow / Appearance: Clear / S.015 / pH: x  Gluc: x / Ketone: Negative  / Bili: Negative / Urobili: Negative mg/dL   Blood: x / Protein: 100 mg/dL / Nitrite: Negative   Leuk Esterase: Trace / RBC: 0-2 /HPF / WBC 0-2   Sq Epi: x / Non Sq Epi: x / Bacteria: Few    RADIOLOGY & ADDITIONAL STUDIES:    Diet, Consistent Carbohydrate Renal w/Evening Snack ( @ 17:23)  Chart Check ( @ 19:14)  Complete Blood Count: Routine (09-10 @ 03:33)  Basic Metabolic Panel: Routine (09-10 @ 03:33)  cloNIDine Patch 0.1 mG/24Hr(s): [Known as CATAPRES-TTS 1]  1 patch, Topical, every 7 days  Administration Instructions: external use only  This is a Look-alike/Sound-alike Medication  Provider's Contact #: [phone] (09-10 @ 11:01)  metoprolol: [Ordered as LOPRESSOR]  100 milliGRAM(s), Oral, every 12 hours  Provider's Contact #: [phone] (09-10 @ 13:27)  Transfer in Level of Care and or Service:     Transfer to Service: Telemetry    Additional Instructions:  d/w Dr. Hernandez (09-10 @ 14:32)  Transfer in Physician:     Transfer to Service: Telemetry    Transfer to Physician: Jung (09-10 @ 14:32)  Vital Signs:     Frequency:  every 4 hours (09-10 @ 14:32)  VTE Prophylaxis - No Pharmacological Prophylaxis Due To::     Time/Priority:  Routine    Reason For No Pharmacologic VTE Prophylaxis  History of cerebral (09-10 @ 14:33)  Intermittent Pneumatic Compression:     Body Side:  Bilateral (09-10 @ 14:33)  hydrALAZINE: [Known as APRESOLINE]  50 milliGRAM(s), Oral, three times a day  Special Instructions: hold for sbp < 100 or HR < 60  Administration Instructions: This is a Look-alike/Sound-alike Medication  Provider's Contact #: [phone] (09-10 @ 15:01)    Assessment & Opinion:  Known ESRD on Hemodialysis withpossible Syncope  Recommendations:  Brain MRI.  Carotid doppler.  Echocardiogram.  EEG.   DVT prophylaxis as ordered.

## 2017-09-10 NOTE — CHART NOTE - NSCHARTNOTEFT_GEN_A_CORE
[facility] NP NOTE    HPI  75 y/o male on 9/8/17 was in recliner, family noticed he stopped talking, was diaphoretic . Eyes open but unresponsive. Called 911. Patient awake when in the ambulence. PMHX: HTN ESRD. Patient was on HD three times week but ws not compliant as he wanted to start to go only twice weekly.  In ER found to have a 6 mm L frontal ICH and SBPs of 260, initially on Cardene gtt, repeat head Ct with stable 6mm ICH. mental status is unchanged.  Seen by neurology Dr. Kohler.  Nephology Dr. Collado. [facility] NP NOTE    HPI  77 y/o male on 9/8/17 was in recliner, family noticed he stopped talking, was diaphoretic . Eyes open but unresponsive. Called 911. Patient awake when in the ambulence. PMHX: HTN ESRD. Patient was on HD three times week but ws not compliant as he wanted to start to go only twice weekly.  In ER found to have a 6 mm L frontal ICH and SBPs of 260, initially on Cardene gtt - now dc'd, repeat head Ct with stable 6mm ICH. mental status is unchanged.  Seen by neurology Dr. Kohler.  PT/OT ordered.  Nephology: Dr. Collado. Patient had HD on 9/9/17.  Blood pressure stabilized on clonidine, nifedipine lopressor and hydralazine.  EEG done this am.  Patient stable for transfer to telemetry.     Report given to Dr. Reed - [facility] service     SHREYA Christian  critical care

## 2017-09-10 NOTE — PROGRESS NOTE ADULT - SUBJECTIVE AND OBJECTIVE BOX
INTERVAL HPI/OVERNIGHT EVENTS:   HPI:  HTN ESRD a/w near syncope found to have a 6 mm L frontal ICH and SBPs of 260, initially on cardene gtt, repeat head Ct with stable 6mm ICH. mental status is unchanged         PAST MEDICAL & SURGICAL HISTORY:      REVIEW OF SYSTEMS:  No HA, no dizziness, No syncope, No vision changes      ICU Vital Signs Last 24 Hrs  T(C): 36.6 (10 Sep 2017 07:03), Max: 37.7 (09 Sep 2017 19:13)  T(F): 97.9 (10 Sep 2017 07:03), Max: 99.8 (09 Sep 2017 19:13)  HR: 60 (10 Sep 2017 11:30) (55 - 71)  BP: 168/63 (10 Sep 2017 11:00) (133/49 - 206/68)  BP(mean): 91 (10 Sep 2017 11:00) (65 - 145)  ABP: --  ABP(mean): --  RR: 14 (10 Sep 2017 11:00) (9 - 21)  SpO2: 99% (10 Sep 2017 11:30) (99% - 100%)          I&O's Detail    09 Sep 2017 07:01  -  10 Sep 2017 07:00  --------------------------------------------------------  IN:    IV PiggyBack: 100 mL  Total IN: 100 mL    OUT:  Total OUT: 0 mL    Total NET: 100 mL            CAPILLARY BLOOD GLUCOSE  160 (10 Sep 2017 07:03)  119 (09 Sep 2017 16:30)        PHYSICAL EXAM:     GEn AAOx3 NAD,   Neuro no focal deficits  HEENt No JVD  Lungs CTA B/L  CVS S1, S2 RR  ABD NT/ND  Ext No edema      LABS:                        9.9    5.7   )-----------( 115      ( 10 Sep 2017 03:55 )             29.9      09-10    144  |  108  |  21  ----------------------------<  224<H>  5.6<H>   |  28  |  5.22<H>    Ca    7.5<L>      10 Sep 2017 03:55  Phos  3.7     09-09  Mg     2.7         TPro  7.2  /  Alb  3.2<L>  /  TBili  0.4  /  DBili  x   /  AST  14<L>  /  ALT  14  /  AlkPhos  52      PT/INR - ( 08 Sep 2017 22:50 )   PT: 11.8 sec;   INR: 1.08 ratio         PTT - ( 08 Sep 2017 22:50 )  PTT:30.1 sec  Urinalysis Basic - ( 08 Sep 2017 22:51 )    Color: Yellow / Appearance: Clear / S.015 / pH: x  Gluc: x / Ketone: Negative  / Bili: Negative / Urobili: Negative mg/dL   Blood: x / Protein: 100 mg/dL / Nitrite: Negative   Leuk Esterase: Trace / RBC: 0-2 /HPF / WBC 0-2   Sq Epi: x / Non Sq Epi: x / Bacteria: Few          RADIOLOGY & ADDITIONAL STUDIES: Repeat CHead Ct stable 6 mm R frontal ICH      Assessment and Plan:    CRITICAL CARE TIME SPENT:

## 2017-09-10 NOTE — PROGRESS NOTE ADULT - ASSESSMENT
ESRD/ HTN emergency with SBP 260s  Goal -150 systolic  Will start clonidine patch 0.1  increase metoprolol to 100 BId, c/w procardia  60. neuro exam is stable and reassuring  HAd HD yesterday  Possible transfer out in AM.  CCM time 56 min

## 2017-09-11 DIAGNOSIS — N18.6 END STAGE RENAL DISEASE: ICD-10-CM

## 2017-09-11 DIAGNOSIS — I16.1 HYPERTENSIVE EMERGENCY: ICD-10-CM

## 2017-09-11 DIAGNOSIS — R55 SYNCOPE AND COLLAPSE: ICD-10-CM

## 2017-09-11 LAB
ANION GAP SERPL CALC-SCNC: 9 MMOL/L — SIGNIFICANT CHANGE UP (ref 5–17)
BUN SERPL-MCNC: 32 MG/DL — HIGH (ref 7–23)
CALCIUM SERPL-MCNC: 7.8 MG/DL — LOW (ref 8.5–10.1)
CHLORIDE SERPL-SCNC: 105 MMOL/L — SIGNIFICANT CHANGE UP (ref 96–108)
CHOLEST SERPL-MCNC: 124 MG/DL — SIGNIFICANT CHANGE UP (ref 10–199)
CO2 SERPL-SCNC: 26 MMOL/L — SIGNIFICANT CHANGE UP (ref 22–31)
CREAT SERPL-MCNC: 7.15 MG/DL — HIGH (ref 0.5–1.3)
GLUCOSE SERPL-MCNC: 158 MG/DL — HIGH (ref 70–99)
HBA1C BLD-MCNC: 6.4 % — HIGH (ref 4–5.6)
HDLC SERPL-MCNC: 23 MG/DL — LOW (ref 40–125)
LIPID PNL WITH DIRECT LDL SERPL: 74 MG/DL — SIGNIFICANT CHANGE UP
POTASSIUM SERPL-MCNC: 6.3 MMOL/L — CRITICAL HIGH (ref 3.5–5.3)
POTASSIUM SERPL-SCNC: 6.3 MMOL/L — CRITICAL HIGH (ref 3.5–5.3)
SODIUM SERPL-SCNC: 140 MMOL/L — SIGNIFICANT CHANGE UP (ref 135–145)
TOTAL CHOLESTEROL/HDL RATIO MEASUREMENT: 5.4 RATIO — SIGNIFICANT CHANGE UP (ref 3.4–9.6)
TRIGL SERPL-MCNC: 135 MG/DL — SIGNIFICANT CHANGE UP (ref 10–149)

## 2017-09-11 PROCEDURE — 99233 SBSQ HOSP IP/OBS HIGH 50: CPT

## 2017-09-11 PROCEDURE — 70551 MRI BRAIN STEM W/O DYE: CPT | Mod: 26

## 2017-09-11 RX ORDER — LACTULOSE 10 G/15ML
10 SOLUTION ORAL ONCE
Qty: 0 | Refills: 0 | Status: COMPLETED | OUTPATIENT
Start: 2017-09-11 | End: 2017-09-11

## 2017-09-11 RX ADMIN — Medication 100 MILLIGRAM(S): at 22:04

## 2017-09-11 RX ADMIN — Medication 50 MILLIGRAM(S): at 13:52

## 2017-09-11 RX ADMIN — Medication 2: at 11:52

## 2017-09-11 RX ADMIN — Medication 50 MILLIGRAM(S): at 22:07

## 2017-09-11 RX ADMIN — Medication 2: at 17:02

## 2017-09-11 RX ADMIN — Medication 100 MILLIGRAM(S): at 05:09

## 2017-09-11 RX ADMIN — Medication 100 MILLIGRAM(S): at 05:08

## 2017-09-11 RX ADMIN — Medication 60 MILLIGRAM(S): at 05:08

## 2017-09-11 RX ADMIN — Medication 100 MILLIGRAM(S): at 17:11

## 2017-09-11 RX ADMIN — LACTULOSE 10 GRAM(S): 10 SOLUTION ORAL at 22:04

## 2017-09-11 RX ADMIN — Medication 100 MILLIGRAM(S): at 13:52

## 2017-09-11 RX ADMIN — Medication 50 MILLIGRAM(S): at 05:08

## 2017-09-11 NOTE — PROVIDER CONTACT NOTE (CRITICAL VALUE NOTIFICATION) - ACTION/TREATMENT ORDERED:
Put out a call to Dr Reed at 1340. Awaiting call back Put out a call to Dr Reed at 1340. Awaiting call back.

## 2017-09-11 NOTE — DIETITIAN INITIAL EVALUATION ADULT. - PHYSICAL APPEARANCE
underweight/BMI = 21.8, no edema noted (9/11) 9/10- 1+- generalized. Nutrition focused physical exam conducted ; found signs of malnutrition [x ]absent [ ]present.  Subcutaneous fat loss: [ ] Orbital fat pads region, [ ]Buccal fat region, [ ]Triceps region,  [ ]Ribs region.  Muscle wasting: [ ]Temples region, [ ]Clavicle region, [ ]Shoulder region, [ ]Scapula region, [ ]Interosseous region,  [ ]thigh region, [ ]Calf region

## 2017-09-11 NOTE — DIETITIAN INITIAL EVALUATION ADULT. - PERTINENT LABORATORY DATA
09-10 Na144 mmol/L Glu 224 mg/dL<H> K+ 5.6 mmol/L<H> Cr  5.22 mg/dL<H> BUN 21 mg/dL Phos n/a   Alb n/a   PAB n/a  GFR -11,  FS - 7:53 - 136

## 2017-09-11 NOTE — DIETITIAN INITIAL EVALUATION ADULT. - NS AS NUTRI INTERV MEDICAL AND FOOD SUPPLEMENTS
Commercial food/Commercial beverage/Recommend: Nepro w/ Carb steady 1x/day = 425 kcal & 19.1 g pro & 1 pkg No carb Prosource daily = 15 g pro & 60 kcal

## 2017-09-11 NOTE — PROGRESS NOTE ADULT - ASSESSMENT
77 y/o male on 9/8/17 was in recliner, family noticed he stopped talking, was diaphoretic . Eyes open but unresponsive. Called 911. Patient awake when in the ambulence. PMHX: HTN ESRD. Patient was on HD three times week but ws not compliant as he wanted to start to go only twice weekly.  In ER found to have a 6 mm L frontal ICH and SBPs of 260, initially on Cardene gtt - now dc'd, repeat head Ct with stable 6mm ICH. mental status is unchanged.  Seen by neurology Dr. Kohler.  PT/OT ordered.  Nephology: Dr. Collado. Patient had HD on 9/9/17.  Blood pressure stabilized on clonidine, nifedipine lopressor and hydralazine.

## 2017-09-11 NOTE — PROGRESS NOTE ADULT - PROBLEM SELECTOR PLAN 1
MRI/EEG/ECHO with no acute changes  Follow up Carotid duplex  Neuro f/u and clearance for d/c to [facility]  No ich noted on MRI

## 2017-09-11 NOTE — DIETITIAN INITIAL EVALUATION ADULT. - ETIOLOGY
Food and nutrition knowledge deficit concerning appropriate amount and type of dietary fat/protein Food and nutrition related knowledge deficit concerning how to make changes

## 2017-09-11 NOTE — PROGRESS NOTE ADULT - SUBJECTIVE AND OBJECTIVE BOX
Patient seen in follow up for ESRD; HTN; appears comfortable.    MEDICATIONS  (STANDING):  insulin lispro (HumaLOG) corrective regimen sliding scale   SubCutaneous three times a day before meals  dextrose 5%. 1000 milliLiter(s) (50 mL/Hr) IV Continuous <Continuous>  dextrose 50% Injectable 12.5 Gram(s) IV Push once  dextrose 50% Injectable 25 Gram(s) IV Push once  dextrose 50% Injectable 25 Gram(s) IV Push once  docusate sodium 100 milliGRAM(s) Oral three times a day  NIFEdipine XL 60 milliGRAM(s) Oral daily  cloNIDine Patch 0.1 mG/24Hr(s) 1 patch Topical every 7 days  metoprolol 100 milliGRAM(s) Oral every 12 hours  hydrALAZINE 50 milliGRAM(s) Oral three times a day    MEDICATIONS  (PRN):  dextrose Gel 1 Dose(s) Oral once PRN Blood Glucose LESS THAN 70 milliGRAM(s)/deciliter  glucagon  Injectable 1 milliGRAM(s) IntraMuscular once PRN Glucose LESS THAN 70 milligrams/deciliter  hydrALAZINE Injectable 10 milliGRAM(s) IV Push every 4 hours PRN SBP>160  acetaminophen   Tablet 650 milliGRAM(s) Oral every 6 hours PRN For Temp greater than 38.5 C (101.3 F)    PHYSICAL EXAM:      T(C): 36.8 (09-11-17 @ 11:58), Max: 36.8 (09-10-17 @ 17:50)  HR: 121 (09-11-17 @ 11:58) (62 - 121)  BP: 150/70 (09-11-17 @ 11:58) (116/53 - 169/62)  RR: 16 (09-11-17 @ 11:58) (10 - 19)  SpO2: 95% (09-11-17 @ 11:58) (95% - 100%)  Wt(kg): --  Respiratory: clear anteriorly, decreased BS at bases  Cardiovascular: S1 S2  Gastrointestinal: soft NT ND +BS  Extremities:   1 edema                                    9.9    5.7   )-----------( 115      ( 10 Sep 2017 03:55 )             29.9     09-11    140  |  105  |  32<H>  ----------------------------<  158<H>  6.3<HH>   |  26  |  7.15<H>    Ca    7.8<L>      11 Sep 2017 12:38            Assessment and Plan:  ESRD; ICH; MAP improving.  HD for tomorrow.

## 2017-09-11 NOTE — PROGRESS NOTE ADULT - SUBJECTIVE AND OBJECTIVE BOX
Patient is a 76y old  Male who presents with a chief complaint of syncopal ep    INTERVAL HPI/OVERNIGHT EVENTS: no events overnight     MEDICATIONS  (STANDING):  insulin lispro (HumaLOG) corrective regimen sliding scale   SubCutaneous three times a day before meals  dextrose 5%. 1000 milliLiter(s) (50 mL/Hr) IV Continuous <Continuous>  dextrose 50% Injectable 12.5 Gram(s) IV Push once  dextrose 50% Injectable 25 Gram(s) IV Push once  dextrose 50% Injectable 25 Gram(s) IV Push once  docusate sodium 100 milliGRAM(s) Oral three times a day  NIFEdipine XL 60 milliGRAM(s) Oral daily  cloNIDine Patch 0.1 mG/24Hr(s) 1 patch Topical every 7 days  metoprolol 100 milliGRAM(s) Oral every 12 hours  hydrALAZINE 50 milliGRAM(s) Oral three times a day    MEDICATIONS  (PRN):  dextrose Gel 1 Dose(s) Oral once PRN Blood Glucose LESS THAN 70 milliGRAM(s)/deciliter  glucagon  Injectable 1 milliGRAM(s) IntraMuscular once PRN Glucose LESS THAN 70 milligrams/deciliter  hydrALAZINE Injectable 10 milliGRAM(s) IV Push every 4 hours PRN SBP>160  acetaminophen   Tablet 650 milliGRAM(s) Oral every 6 hours PRN For Temp greater than 38.5 C (101.3 F)      Allergies    No Known Allergies    Intolerances        REVIEW OF SYSTEMS:  CONSTITUTIONAL: No fever, weight loss, or fatigue  EYES: No eye pain, visual disturbances, or discharge  ENMT:  No difficulty hearing, tinnitus, vertigo; No sinus or throat pain  NECK: No pain or stiffness  BREASTS: No pain, masses, or nipple discharge  RESPIRATORY: No cough, wheezing, chills or hemoptysis; No shortness of breath  CARDIOVASCULAR: No chest pain, palpitations, dizziness, or leg swelling  GASTROINTESTINAL: No abdominal or epigastric pain. No nausea, vomiting, or hematemesis; No diarrhea or constipation. No melena or hematochezia.  GENITOURINARY: No dysuria, frequency, hematuria, or incontinence  NEUROLOGICAL: No headaches, memory loss, loss of strength, numbness, or tremors  SKIN: No itching, burning, rashes, or lesions   LYMPH NODES: No enlarged glands  ENDOCRINE: No heat or cold intolerance; No hair loss  MUSCULOSKELETAL: No joint pain or swelling; No muscle, back, or extremity pain  PSYCHIATRIC: No depression, anxiety, mood swings, or difficulty sleeping  HEME/LYMPH: No easy bruising, or bleeding gums  ALLERGY AND IMMUNOLOGIC: No hives or eczema    Vital Signs Last 24 Hrs  T(C): 36.8 (11 Sep 2017 11:58), Max: 36.8 (10 Sep 2017 17:50)  T(F): 98.2 (11 Sep 2017 11:58), Max: 98.2 (10 Sep 2017 17:50)  HR: 121 (11 Sep 2017 11:58) (59 - 121)  BP: 150/70 (11 Sep 2017 11:58) (116/53 - 171/62)  BP(mean): 76 (10 Sep 2017 18:30) (76 - 90)  RR: 16 (11 Sep 2017 11:58) (9 - 19)  SpO2: 95% (11 Sep 2017 11:58) (95% - 100%)    PHYSICAL EXAM:  GENERAL: NAD, well-groomed, well-developed  HEAD:  Atraumatic, Normocephalic  EYES: EOMI, PERRLA, conjunctiva and sclera clear  ENMT: No tonsillar erythema, exudates, or enlargement; Moist mucous membranes, Good dentition, No lesions  NECK: Supple, No JVD, Normal thyroid  NERVOUS SYSTEM:  Alert & Oriented X3, Good concentration; Motor Strength 5/5 B/L upper and lower extremities; DTRs 2+ intact and symmetric  CHEST/LUNG: Clear to percussion bilaterally; No rales, rhonchi, wheezing, or rubs  HEART: Regular rate and rhythm; No murmurs, rubs, or gallops  ABDOMEN: Soft, Nontender, Nondistended; Bowel sounds present  EXTREMITIES:  2+ Peripheral Pulses, No clubbing, cyanosis, or edema, LE fistula +bruit/thrill   LYMPH: No lymphadenopathy noted  SKIN: No rashes or lesions    LABS:                        9.9    5.7   )-----------( 115      ( 10 Sep 2017 03:55 )             29.9     09-10    144  |  108  |  21  ----------------------------<  224<H>  5.6<H>   |  28  |  5.22<H>    Ca    7.5<L>      10 Sep 2017 03:55          CAPILLARY BLOOD GLUCOSE  154 (11 Sep 2017 11:51)  136 (11 Sep 2017 07:53)  129 (10 Sep 2017 22:30)  140 (10 Sep 2017 16:50)          RADIOLOGY & ADDITIONAL TESTS:    Imaging Personally Reviewed:  [ ] YES  [ ] NO    Consultant(s) Notes Reviewed:  [ ] YES  [ ] NO    Care Discussed with Consultants/Other Providers [ ] YES  [ ] NO

## 2017-09-11 NOTE — PROGRESS NOTE ADULT - SUBJECTIVE AND OBJECTIVE BOX
INTERVAL HPI/OVERNIGHT EVENTS:  Subjective Complaints:  Offers no complaints.  Now on REgular Floor.  Sitting up by bedside chair.  Exhibits no focal motor sensory deficits.  VSS.  Speech clear.    RECENT RADIOLOGY & ADDITIONAL TESTS:  Brain MRI (today):  1)  multifocal chronic ischemic changes with no acute abnormality. Atrophy..  2)  extensive inflammatory changes in the paranasal sinuses and left temporal bone..      NEUROLOGICAL EXAM (Pertinent):  Vital Signs Last 24 Hrs  T(C): 36.6 (11 Sep 2017 17:29), Max: 36.8 (11 Sep 2017 11:58)  T(F): 97.8 (11 Sep 2017 17:29), Max: 98.2 (11 Sep 2017 11:58)  HR: 67 (11 Sep 2017 17:29) (67 - 121)  BP: 146/61 (11 Sep 2017 17:29) (126/74 - 150/70)  BP(mean): --  RR: 18 (11 Sep 2017 17:29) (16 - 18)  SpO2: 99% (11 Sep 2017 17:29) (95% - 100%)    MEDICATIONS  (STANDING):  insulin lispro (HumaLOG) corrective regimen sliding scale   SubCutaneous three times a day before meals  dextrose 5%. 1000 milliLiter(s) (50 mL/Hr) IV Continuous <Continuous>  dextrose 50% Injectable 12.5 Gram(s) IV Push once  dextrose 50% Injectable 25 Gram(s) IV Push once  dextrose 50% Injectable 25 Gram(s) IV Push once  docusate sodium 100 milliGRAM(s) Oral three times a day  NIFEdipine XL 60 milliGRAM(s) Oral daily  cloNIDine Patch 0.1 mG/24Hr(s) 1 patch Topical every 7 days  metoprolol 100 milliGRAM(s) Oral every 12 hours  hydrALAZINE 50 milliGRAM(s) Oral three times a day    MEDICATIONS  (PRN):  dextrose Gel 1 Dose(s) Oral once PRN Blood Glucose LESS THAN 70 milliGRAM(s)/deciliter  glucagon  Injectable 1 milliGRAM(s) IntraMuscular once PRN Glucose LESS THAN 70 milligrams/deciliter  hydrALAZINE Injectable 10 milliGRAM(s) IV Push every 4 hours PRN SBP>160  acetaminophen   Tablet 650 milliGRAM(s) Oral every 6 hours PRN For Temp greater than 38.5 C (101.3 F)    LABS:                        9.9    5.7   )-----------( 115      ( 10 Sep 2017 03:55 )             29.9     09-11    140  |  105  |  32<H>  ----------------------------<  158<H>  6.3<HH>   |  26  |  7.15<H>    Ca    7.8<L>      11 Sep 2017 12:38    Assessment & Opinion:  Known ESRD on Hemodialysis with possible Syncope.  Presently with stable neuro examination.  Recommendations:   Carotid doppler.  Echocardiogram.   DVT prophylaxis as ordered.  Neurologically stable for discharge.

## 2017-09-11 NOTE — DIETITIAN INITIAL EVALUATION ADULT. - OTHER INFO
Pt seen today due to RN referral for Dialysis. Pt lives @ home w/ his daughter whom does the food shopping/cooking. She is aware of the foods he needs to eat for his diabetes and kidneys. He does not check his FS or take his medications properly. Denies food allergies. ESRD on HD 3x/week. Does not know when his last BM was. Consuming < 50% of his meals. Pt states he has always been this and maintains his weight well. His FS iare checked and monitored at the dialysis center.

## 2017-09-11 NOTE — DIETITIAN INITIAL EVALUATION ADULT. - NS AS NUTRI INTERV ED CONTENT3
Purpose of the nutrition education/CKD tips for people on dialysis; Type 2 DM Nutrition therapy/Survival information

## 2017-09-12 ENCOUNTER — TRANSCRIPTION ENCOUNTER (OUTPATIENT)
Age: 77
End: 2017-09-12

## 2017-09-12 VITALS
RESPIRATION RATE: 16 BRPM | HEART RATE: 69 BPM | DIASTOLIC BLOOD PRESSURE: 61 MMHG | SYSTOLIC BLOOD PRESSURE: 159 MMHG | OXYGEN SATURATION: 100 % | TEMPERATURE: 98 F

## 2017-09-12 LAB
ANION GAP SERPL CALC-SCNC: 10 MMOL/L — SIGNIFICANT CHANGE UP (ref 5–17)
ANION GAP SERPL CALC-SCNC: 12 MMOL/L — SIGNIFICANT CHANGE UP (ref 5–17)
BUN SERPL-MCNC: 10 MG/DL — SIGNIFICANT CHANGE UP (ref 7–23)
BUN SERPL-MCNC: 44 MG/DL — HIGH (ref 7–23)
CALCIUM SERPL-MCNC: 7.9 MG/DL — LOW (ref 8.5–10.1)
CALCIUM SERPL-MCNC: 8.2 MG/DL — LOW (ref 8.5–10.1)
CHLORIDE SERPL-SCNC: 100 MMOL/L — SIGNIFICANT CHANGE UP (ref 96–108)
CHLORIDE SERPL-SCNC: 103 MMOL/L — SIGNIFICANT CHANGE UP (ref 96–108)
CO2 SERPL-SCNC: 23 MMOL/L — SIGNIFICANT CHANGE UP (ref 22–31)
CO2 SERPL-SCNC: 27 MMOL/L — SIGNIFICANT CHANGE UP (ref 22–31)
CREAT SERPL-MCNC: 3.11 MG/DL — HIGH (ref 0.5–1.3)
CREAT SERPL-MCNC: 8.48 MG/DL — HIGH (ref 0.5–1.3)
FERRITIN SERPL-MCNC: 1238 NG/ML — HIGH (ref 30–400)
GLUCOSE SERPL-MCNC: 143 MG/DL — HIGH (ref 70–99)
GLUCOSE SERPL-MCNC: 257 MG/DL — HIGH (ref 70–99)
HCT VFR BLD CALC: 32.1 % — LOW (ref 39–50)
HGB BLD-MCNC: 10.6 G/DL — LOW (ref 13–17)
MCHC RBC-ENTMCNC: 32.6 PG — SIGNIFICANT CHANGE UP (ref 27–34)
MCHC RBC-ENTMCNC: 32.8 GM/DL — SIGNIFICANT CHANGE UP (ref 32–36)
MCV RBC AUTO: 99.2 FL — SIGNIFICANT CHANGE UP (ref 80–100)
PLATELET # BLD AUTO: 126 K/UL — LOW (ref 150–400)
POTASSIUM SERPL-MCNC: 5 MMOL/L — SIGNIFICANT CHANGE UP (ref 3.5–5.3)
POTASSIUM SERPL-MCNC: 6.5 MMOL/L — CRITICAL HIGH (ref 3.5–5.3)
POTASSIUM SERPL-SCNC: 5 MMOL/L — SIGNIFICANT CHANGE UP (ref 3.5–5.3)
POTASSIUM SERPL-SCNC: 6.5 MMOL/L — CRITICAL HIGH (ref 3.5–5.3)
RBC # BLD: 3.24 M/UL — LOW (ref 4.2–5.8)
RBC # FLD: 13.3 % — SIGNIFICANT CHANGE UP (ref 11–15)
SODIUM SERPL-SCNC: 137 MMOL/L — SIGNIFICANT CHANGE UP (ref 135–145)
SODIUM SERPL-SCNC: 138 MMOL/L — SIGNIFICANT CHANGE UP (ref 135–145)
WBC # BLD: 9.4 K/UL — SIGNIFICANT CHANGE UP (ref 3.8–10.5)
WBC # FLD AUTO: 9.4 K/UL — SIGNIFICANT CHANGE UP (ref 3.8–10.5)

## 2017-09-12 PROCEDURE — 99239 HOSP IP/OBS DSCHRG MGMT >30: CPT

## 2017-09-12 PROCEDURE — 93880 EXTRACRANIAL BILAT STUDY: CPT | Mod: 26

## 2017-09-12 RX ORDER — ACETAMINOPHEN 500 MG
2 TABLET ORAL
Qty: 0 | Refills: 0 | COMMUNITY
Start: 2017-09-12

## 2017-09-12 RX ORDER — POLYETHYLENE GLYCOL 3350 17 G/17G
17 POWDER, FOR SOLUTION ORAL ONCE
Qty: 0 | Refills: 0 | Status: COMPLETED | OUTPATIENT
Start: 2017-09-12 | End: 2017-09-12

## 2017-09-12 RX ADMIN — Medication 60 MILLIGRAM(S): at 05:29

## 2017-09-12 RX ADMIN — Medication 100 MILLIGRAM(S): at 18:29

## 2017-09-12 RX ADMIN — Medication 100 MILLIGRAM(S): at 05:30

## 2017-09-12 RX ADMIN — POLYETHYLENE GLYCOL 3350 17 GRAM(S): 17 POWDER, FOR SOLUTION ORAL at 16:28

## 2017-09-12 RX ADMIN — Medication 100 MILLIGRAM(S): at 05:29

## 2017-09-12 RX ADMIN — Medication 50 MILLIGRAM(S): at 05:29

## 2017-09-12 NOTE — DISCHARGE NOTE ADULT - HOSPITAL COURSE
77 y/o male on 9/8/17 was in recliner, family noticed he stopped talking, was diaphoretic . Eyes open but unresponsive. Called 911. Patient awake when in the ambulence. PMHX: HTN ESRD. Patient was on HD three times week but ws not compliant as he wanted to start to go only twice weekly.  In ER found to have a 6 mm L frontal ICH and SBPs of 260,started on Cardene gtt. repeat head Ct with stable 6mm ICH. mental status is unchanged.  MRI brain done and showed no ICH. Seen by neurology Dr. Kohler and recommended  Echo, EEG and carotid doppler, which were all negative.  PT/OT ordered and will do as outpatient pt.  Nephology: Dr. Collado. Patient had HD on 9/12/17.  Blood pressure stabilized on clonidine, nifedipine lopressor and hydralazine. Pt is to follow up with nephrology and neurology

## 2017-09-12 NOTE — PROGRESS NOTE ADULT - SUBJECTIVE AND OBJECTIVE BOX
Patient seen in follow up for ESRD; comfortable, no distress.    MEDICATIONS  (STANDING):  insulin lispro (HumaLOG) corrective regimen sliding scale   SubCutaneous three times a day before meals  dextrose 5%. 1000 milliLiter(s) (50 mL/Hr) IV Continuous <Continuous>  dextrose 50% Injectable 12.5 Gram(s) IV Push once  dextrose 50% Injectable 25 Gram(s) IV Push once  dextrose 50% Injectable 25 Gram(s) IV Push once  docusate sodium 100 milliGRAM(s) Oral three times a day  NIFEdipine XL 60 milliGRAM(s) Oral daily  cloNIDine Patch 0.1 mG/24Hr(s) 1 patch Topical every 7 days  metoprolol 100 milliGRAM(s) Oral every 12 hours  hydrALAZINE 50 milliGRAM(s) Oral three times a day  polyethylene glycol 3350 17 Gram(s) Oral once    MEDICATIONS  (PRN):  dextrose Gel 1 Dose(s) Oral once PRN Blood Glucose LESS THAN 70 milliGRAM(s)/deciliter  glucagon  Injectable 1 milliGRAM(s) IntraMuscular once PRN Glucose LESS THAN 70 milligrams/deciliter  hydrALAZINE Injectable 10 milliGRAM(s) IV Push every 4 hours PRN SBP>160  acetaminophen   Tablet 650 milliGRAM(s) Oral every 6 hours PRN For Temp greater than 38.5 C (101.3 F)    PHYSICAL EXAM:      T(C): 36.3 (09-12-17 @ 15:00), Max: 37.7 (09-12-17 @ 05:35)  HR: 64 (09-12-17 @ 15:00) (64 - 68)  BP: 142/56 (09-12-17 @ 15:00) (142/56 - 199/74)  RR: 16 (09-12-17 @ 15:00) (16 - 18)  SpO2: 100% (09-12-17 @ 15:00) (98% - 100%)  Wt(kg): --  Respiratory: clear anteriorly, decreased BS at bases  Cardiovascular: S1 S2  Gastrointestinal: soft NT ND +BS  Extremities:  tr edema                                    10.6   9.4   )-----------( 126      ( 12 Sep 2017 11:23 )             32.1     09-12    138  |  103  |  44<H>  ----------------------------<  143<H>  6.5<HH>   |  23  |  8.48<H>    Ca    7.9<L>      12 Sep 2017 11:23            Assessment and Plan:    Maintenance HD for 4 hours Revacleat 400  450/ 800 BFR/DFR;  Follow K2-3 hours post HD and in AM.

## 2017-09-12 NOTE — DISCHARGE NOTE ADULT - MEDICATION SUMMARY - MEDICATIONS TO TAKE
I will START or STAY ON the medications listed below when I get home from the hospital:    acetaminophen 325 mg oral tablet  -- 2 tab(s) by mouth every 6 hours, As needed, For Temp greater than 38.5 C (101.3 F)  -- Indication: For ESRD (end stage renal disease) on dialysis    cloNIDine 0.1 mg oral tablet  -- 1 tab(s) by mouth 2 times a day  -- Indication: For ESRD (end stage renal disease) on dialysis    Lopressor 50 mg oral tablet  -- 1 tab(s) by mouth 2 times a day  -- Indication: For ESRD (end stage renal disease) on dialysis    Procardia  -- 60 milligram(s) by mouth once a day  -- Indication: For ESRD (end stage renal disease) on dialysis

## 2017-09-12 NOTE — DISCHARGE NOTE ADULT - PLAN OF CARE
continue dialysis follow up with nephrologist follow up with nephrologist and pcp c/w home regiment follow up with neurology

## 2017-09-12 NOTE — DISCHARGE NOTE ADULT - CARE PLAN
Principal Discharge DX:	ESRD (end stage renal disease) on dialysis  Goal:	continue dialysis  Instructions for follow-up, activity and diet:	follow up with nephrologist  Secondary Diagnosis:	Hypertensive emergency  Goal:	follow up with nephrologist and pcp  Instructions for follow-up, activity and diet:	c/w home regiment  Secondary Diagnosis:	Syncope, unspecified syncope type  Goal:	follow up with neurology

## 2017-09-14 DIAGNOSIS — E11.22 TYPE 2 DIABETES MELLITUS WITH DIABETIC CHRONIC KIDNEY DISEASE: ICD-10-CM

## 2017-09-14 DIAGNOSIS — I16.1 HYPERTENSIVE EMERGENCY: ICD-10-CM

## 2017-09-14 DIAGNOSIS — R55 SYNCOPE AND COLLAPSE: ICD-10-CM

## 2017-09-14 DIAGNOSIS — Z99.2 DEPENDENCE ON RENAL DIALYSIS: ICD-10-CM

## 2017-09-14 DIAGNOSIS — I12.0 HYPERTENSIVE CHRONIC KIDNEY DISEASE WITH STAGE 5 CHRONIC KIDNEY DISEASE OR END STAGE RENAL DISEASE: ICD-10-CM

## 2017-09-14 DIAGNOSIS — Z91.15 PATIENT'S NONCOMPLIANCE WITH RENAL DIALYSIS: ICD-10-CM

## 2017-09-14 DIAGNOSIS — E87.5 HYPERKALEMIA: ICD-10-CM

## 2017-09-14 DIAGNOSIS — N18.6 END STAGE RENAL DISEASE: ICD-10-CM

## 2017-10-17 ENCOUNTER — INPATIENT (INPATIENT)
Facility: HOSPITAL | Age: 77
LOS: 9 days | Discharge: HOME HEALTH | End: 2017-10-27
Attending: INTERNAL MEDICINE | Admitting: INTERNAL MEDICINE
Payer: MEDICARE

## 2017-10-17 VITALS
SYSTOLIC BLOOD PRESSURE: 231 MMHG | DIASTOLIC BLOOD PRESSURE: 80 MMHG | TEMPERATURE: 99 F | RESPIRATION RATE: 20 BRPM | OXYGEN SATURATION: 99 % | HEART RATE: 56 BPM

## 2017-10-17 DIAGNOSIS — Z98.89 OTHER SPECIFIED POSTPROCEDURAL STATES: Chronic | ICD-10-CM

## 2017-10-17 DIAGNOSIS — E83.39 OTHER DISORDERS OF PHOSPHORUS METABOLISM: ICD-10-CM

## 2017-10-17 DIAGNOSIS — I12.0 HYPERTENSIVE CHRONIC KIDNEY DISEASE WITH STAGE 5 CHRONIC KIDNEY DISEASE OR END STAGE RENAL DISEASE: ICD-10-CM

## 2017-10-17 DIAGNOSIS — H70.90 UNSPECIFIED MASTOIDITIS, UNSPECIFIED EAR: ICD-10-CM

## 2017-10-17 DIAGNOSIS — D63.1 ANEMIA IN CHRONIC KIDNEY DISEASE: ICD-10-CM

## 2017-10-17 DIAGNOSIS — N18.6 END STAGE RENAL DISEASE: ICD-10-CM

## 2017-10-17 LAB
ALBUMIN SERPL ELPH-MCNC: 3.7 G/DL — SIGNIFICANT CHANGE UP (ref 3.3–5)
ALP SERPL-CCNC: 52 U/L — SIGNIFICANT CHANGE UP (ref 40–120)
ALT FLD-CCNC: 9 U/L — SIGNIFICANT CHANGE UP (ref 4–41)
APTT BLD: 33.5 SEC — SIGNIFICANT CHANGE UP (ref 27.5–37.4)
AST SERPL-CCNC: 16 U/L — SIGNIFICANT CHANGE UP (ref 4–40)
BASE EXCESS BLDA CALC-SCNC: 6.2 MMOL/L — SIGNIFICANT CHANGE UP
BASOPHILS # BLD AUTO: 0.05 K/UL — SIGNIFICANT CHANGE UP (ref 0–0.2)
BASOPHILS NFR BLD AUTO: 0.7 % — SIGNIFICANT CHANGE UP (ref 0–2)
BILIRUB SERPL-MCNC: 0.6 MG/DL — SIGNIFICANT CHANGE UP (ref 0.2–1.2)
BUN SERPL-MCNC: 35 MG/DL — HIGH (ref 7–23)
CALCIUM SERPL-MCNC: 8 MG/DL — LOW (ref 8.4–10.5)
CHLORIDE BLDA-SCNC: 101 MMOL/L — SIGNIFICANT CHANGE UP (ref 96–108)
CHLORIDE SERPL-SCNC: 98 MMOL/L — SIGNIFICANT CHANGE UP (ref 98–107)
CO2 SERPL-SCNC: 27 MMOL/L — SIGNIFICANT CHANGE UP (ref 22–31)
CREAT BLDA-MCNC: 6.87 MG/DL — HIGH (ref 0.5–1.3)
CREAT SERPL-MCNC: 6.63 MG/DL — HIGH (ref 0.5–1.3)
EOSINOPHIL # BLD AUTO: 0.28 K/UL — SIGNIFICANT CHANGE UP (ref 0–0.5)
EOSINOPHIL NFR BLD AUTO: 4.2 % — SIGNIFICANT CHANGE UP (ref 0–6)
GLUCOSE BLDA-MCNC: 138 MG/DL — HIGH (ref 70–99)
GLUCOSE SERPL-MCNC: 137 MG/DL — HIGH (ref 70–99)
HCO3 BLDA-SCNC: 30 MMOL/L — HIGH (ref 22–26)
HCT VFR BLD CALC: 34 % — LOW (ref 39–50)
HCT VFR BLDA CALC: 36 % — LOW (ref 39–51)
HGB BLD-MCNC: 11.4 G/DL — LOW (ref 13–17)
HGB BLDA-MCNC: 11.7 G/DL — LOW (ref 13–17)
IMM GRANULOCYTES # BLD AUTO: 0.02 # — SIGNIFICANT CHANGE UP
IMM GRANULOCYTES NFR BLD AUTO: 0.3 % — SIGNIFICANT CHANGE UP (ref 0–1.5)
INR BLD: 1.04 — SIGNIFICANT CHANGE UP (ref 0.88–1.17)
LACTATE BLDA-SCNC: 0.9 MMOL/L — SIGNIFICANT CHANGE UP (ref 0.5–2)
LYMPHOCYTES # BLD AUTO: 1.66 K/UL — SIGNIFICANT CHANGE UP (ref 1–3.3)
LYMPHOCYTES # BLD AUTO: 24.6 % — SIGNIFICANT CHANGE UP (ref 13–44)
MCHC RBC-ENTMCNC: 31.4 PG — SIGNIFICANT CHANGE UP (ref 27–34)
MCHC RBC-ENTMCNC: 33.5 % — SIGNIFICANT CHANGE UP (ref 32–36)
MCV RBC AUTO: 93.7 FL — SIGNIFICANT CHANGE UP (ref 80–100)
MONOCYTES # BLD AUTO: 0.6 K/UL — SIGNIFICANT CHANGE UP (ref 0–0.9)
MONOCYTES NFR BLD AUTO: 8.2 % — SIGNIFICANT CHANGE UP (ref 2–14)
NEUTROPHILS # BLD AUTO: 4.13 K/UL — SIGNIFICANT CHANGE UP (ref 1.8–7.4)
NEUTROPHILS NFR BLD AUTO: 61.3 % — SIGNIFICANT CHANGE UP (ref 43–77)
NRBC # FLD: 0 — SIGNIFICANT CHANGE UP
PCO2 BLDA: 45 MMHG — SIGNIFICANT CHANGE UP (ref 35–48)
PH BLDA: 7.44 PH — SIGNIFICANT CHANGE UP (ref 7.35–7.45)
PLATELET # BLD AUTO: 103 K/UL — LOW (ref 150–400)
PMV BLD: 9.9 FL — SIGNIFICANT CHANGE UP (ref 7–13)
PO2 BLDA: 118 MMHG — HIGH (ref 83–108)
POTASSIUM BLDA-SCNC: 4.7 MMOL/L — HIGH (ref 3.4–4.5)
POTASSIUM SERPL-MCNC: 5 MMOL/L — SIGNIFICANT CHANGE UP (ref 3.5–5.3)
POTASSIUM SERPL-SCNC: 5 MMOL/L — SIGNIFICANT CHANGE UP (ref 3.5–5.3)
PROT SERPL-MCNC: 6.9 G/DL — SIGNIFICANT CHANGE UP (ref 6–8.3)
PROTHROM AB SERPL-ACNC: 11.7 SEC — SIGNIFICANT CHANGE UP (ref 9.8–13.1)
RBC # BLD: 3.63 M/UL — LOW (ref 4.2–5.8)
RBC # FLD: 13.8 % — SIGNIFICANT CHANGE UP (ref 10.3–14.5)
SAO2 % BLDA: 97.1 % — SIGNIFICANT CHANGE UP (ref 95–99)
SODIUM BLDA-SCNC: 135 MMOL/L — LOW (ref 136–146)
SODIUM SERPL-SCNC: 138 MMOL/L — SIGNIFICANT CHANGE UP (ref 135–145)
WBC # BLD: 6.74 K/UL — SIGNIFICANT CHANGE UP (ref 3.8–10.5)
WBC # FLD AUTO: 6.74 K/UL — SIGNIFICANT CHANGE UP (ref 3.8–10.5)

## 2017-10-17 PROCEDURE — 70450 CT HEAD/BRAIN W/O DYE: CPT | Mod: 26

## 2017-10-17 PROCEDURE — 76536 US EXAM OF HEAD AND NECK: CPT | Mod: 26

## 2017-10-17 RX ORDER — PIPERACILLIN AND TAZOBACTAM 4; .5 G/20ML; G/20ML
3.38 INJECTION, POWDER, LYOPHILIZED, FOR SOLUTION INTRAVENOUS ONCE
Qty: 0 | Refills: 0 | Status: COMPLETED | OUTPATIENT
Start: 2017-10-17 | End: 2017-10-17

## 2017-10-17 RX ADMIN — PIPERACILLIN AND TAZOBACTAM 200 GRAM(S): 4; .5 INJECTION, POWDER, LYOPHILIZED, FOR SOLUTION INTRAVENOUS at 20:51

## 2017-10-17 RX ADMIN — Medication 0.2 MILLIGRAM(S): at 22:34

## 2017-10-17 RX ADMIN — Medication 100 MILLIGRAM(S): at 18:57

## 2017-10-17 NOTE — ED ADULT NURSE NOTE - CHIEF COMPLAINT QUOTE
From Dialysis by EMS for R/O stroke with left side weakness, some left facial droop, as per EMS upper and lower ext. were weaker at Dialysis now appears resolving      Dr. Valerio Butler. for Stroke no code called.   pt AOX 3 + upper and lower ext. equal strength   [id number]

## 2017-10-17 NOTE — CONSULT NOTE ADULT - ASSESSMENT
AP: likely severe otitis externa with surrounding cellulitis and undlerying soft tissue spread of infection  -will need iv abx  -strict glucose control  -ciprodex drops 4 drops bid  -fu ID recommendations  -no pus to culture  -if not improving may need definitive CT or OC face w/ contrast  -will continue to follow

## 2017-10-17 NOTE — ED ADULT NURSE REASSESSMENT NOTE - NS ED NURSE REASSESS COMMENT FT1
pt returned back to unit from CT. pt ordered for Clindamycin. Spoke to Resident Jose Martin and Dr. Chowdhury about cultures for pt due to pt being difficult to access veins. As per Attending Dr. Chowdhury, ok to start antibiotics without cultures. pt has left neck EJ placed by Resident Tolu. pt returned back to unit from CT. pt ordered for Clindamycin. Spoke to Resident Jose Martin and Dr. Chowdhury about cultures for pt due to pt being difficult to access veins. As per Attending Dr. Chowdhury, ok to start antibiotics without cultures. pt has left neck EJ placed by Resident Jenifer.

## 2017-10-17 NOTE — CONSULT NOTE ADULT - ASSESSMENT
76 year old male h/o ESRD on HD presents with L facial swelling. Nephrology consulted for ESRD status.

## 2017-10-17 NOTE — ED ADULT NURSE REASSESSMENT NOTE - NS ED NURSE REASSESS COMMENT FT1
Patient BP elevated (230/78).  [facility] contacted about patient BP.  Awaiting orders from [facility].  Will continue to monitor patient.

## 2017-10-17 NOTE — ED PROVIDER NOTE - MEDICAL DECISION MAKING DETAILS
77 yo gentlemen with ? tia vs cva. Also left sided ? facial cellulitis with abscess.   Plan: ct head, labs, IV abx, admit to medicine. Nephrology ,ent, neuro will be consulted

## 2017-10-17 NOTE — ED PROVIDER NOTE - OBJECTIVE STATEMENT
75 yo male with HTN, CAD x 1 stent, ESRD on dialysis (tuesday, thursday, saturday) received half of dialysis today when pt was not responding, became confused. Pt wife giving most of history, wife reporting pt with left sided weakness early this am, stating that for the past three days patient has been confused and less responsive to her. Pt became less responsive during is dialysis treatment today. 75 yo male with HTN, CAD x 1 stent, ESRD on dialysis (tuesday, thursday, saturday) received half of dialysis today when pt was not responding, became confused. Pt wife giving most of history, wife reporting pt with left sided weakness early this am, stating that for the past three days patient has been confused and less responsive to her. Pt became less responsive during is dialysis treatment today. wife reporting no sign of recent cough, fevers. Pt denying cp, sob, 77 yo male with HTN, CAD x 1 stent, ESRD on dialysis (tuesday, thursday, saturday) received half of dialysis today when pt was not responding, became confused. Pt wife giving most of history, wife reporting pt with left sided weakness early this am, stating that for the past three days patient has been confused and less responsive to her. Pt became less responsive during is dialysis treatment today. wife reporting no sign of recent cough, fevers. Pt denying cp, sob, abdominal pain.

## 2017-10-17 NOTE — CONSULT NOTE ADULT - PROBLEM SELECTOR RECOMMENDATION 9
Last HD earlier today as outpatient terminated after 30 minutes. Follow up labs today (P). Will consider inpatient today if needed otherwise will defer until tomorrow. Monitor electrolytes.

## 2017-10-17 NOTE — ED ADULT TRIAGE NOTE - CHIEF COMPLAINT QUOTE
From Dialysis by EMS for R/O stroke with left side weakness, some left facial droop, as per EMS upper and lower ext. were weaker at Dialysis now appears resolving      Dr. Valerio Butler. for Stroke no code called.   pt AOX 3 + upper and lower ext. equal strength   [id number]

## 2017-10-17 NOTE — CONSULT NOTE ADULT - ASSESSMENT
75 yo m, with PMHx of CAD on dual antiplatelet therapy, HTN, ESRD on HD, presented with 3 days of lethargy and weakness (L>R) followed sudden onset of encephalopathy which described as unresponsiveness, during dialysis today as well as worsening left sided weakness. Exam does not show any substantial left sided weakness. CT H is positive for significant white matter disease with progressive tissue swelling over the left frontotemporal region as well as mastoiditis with indistinctness of bony walls with possible necrotizing otitis exrterna.  Impression: generalized weakness and lethargy likely due to systemic toxic metabolic response to an inflammatory process, infection in this case. Less likely to have a CNS origin at this time, given the non focal neuro exam, however, MRI brain w/wo contrast can be helpful to evaluate. Unfortunately the patient has ESRD and [facility] contrast should be avoided. Would suggest CTH with contrast, MRI and MRA H&N without contrast to evaluate.  Plan:  [] C/W Abx  [] ENT input  [] CTH with contrast  [] MRI brain no contrast  [] MRA H&N no contrast

## 2017-10-17 NOTE — ED ADULT NURSE REASSESSMENT NOTE - NS ED NURSE REASSESS COMMENT FT1
Received patient report from FRANTZ Cano @2030.  Patient is a 75 y/o male, A&O x 2, NAD, brought to ED by EMS to r/o stroke.  Patient became unresponsive during dialysis and EMS was called.  EMS noted left facial dropping and edema.  Patient evaluated by Dr. Luo and determined no code stroke.  Patient denies any s/sx at this time.  Per Wife, patient is at baseline.  Admitted to med for mastoiditis.  Vitals taken, antibiotics started and will continue to monitor patient.  Wife at bedside.

## 2017-10-17 NOTE — CONSULT NOTE ADULT - PROBLEM SELECTOR RECOMMENDATION 2
BP uncontrolled however would recheck as patient believes BP was 130 systolic at HD earlier today. Restart home medications. Will titrate as needed. Monitor BP.

## 2017-10-17 NOTE — CONSULT NOTE ADULT - SUBJECTIVE AND OBJECTIVE BOX
This is an incomplete note  HPI:  The patient is a 75 yo m, with pmhx of CAD on dual antiplatelet therapy, ESRD on HD, HTN P/W 3 days onset of confusion and lethargy. Today during HD reportedly he was more confused and the wife noticed left sided weakness.   He is back to baseline at this time.        MEDICATIONS  (STANDING):  piperacillin/tazobactam IVPB. 3.375 Gram(s) IV Intermittent once    MEDICATIONS  (PRN):    PAST MEDICAL & SURGICAL HISTORY:  PVD (peripheral vascular disease)  CAD (coronary artery disease): s/p 1 stent  HLD (hyperlipidemia)  HTN (hypertension)  DM (diabetes mellitus)  ESRD (end stage renal disease)  H/O foot surgery: sec to ankle fracture  S/P cataract extraction  S/P coronary artery stent placement  S/P cholecystectomy  AVF (arteriovenous fistula)    FAMILY HISTORY:  No pertinent family history in first degree relatives    Allergies    No Known Allergies    Intolerances        SHx - No smoking, No ETOH, No drug abuse    Review of Systems:  CONSTITUTIONAL:  No weight loss, fever, chills, weakness or fatigue.  HEENT:  Eyes:  No visual loss, blurred vision, double vision or yellow sclerae. Ears, Nose, Throat:  No hearing loss, sneezing, congestion, runny nose or sore throat.  CARDIOVASCULAR:  No chest pain, chest pressure or chest discomfort. No palpitations or edema.  GASTROINTESTINAL:  No anorexia, nausea, vomiting or diarrhea. No abdominal pain or blood.  NEUROLOGICAL: See HPI  MUSCULOSKELETAL:  No muscle, back pain, joint pain or stiffness.  PSYCHIATRIC:  No history of depression or anxiety.      Vital Signs Last 24 Hrs  T(C): 37 (17 Oct 2017 16:01), Max: 37 (17 Oct 2017 16:01)  T(F): 98.6 (17 Oct 2017 16:01), Max: 98.6 (17 Oct 2017 16:01)  HR: 56 (17 Oct 2017 16:01) (56 - 56)  BP: 231/80 (17 Oct 2017 16:01) (231/80 - 231/80)  BP(mean): --  RR: 20 (17 Oct 2017 16:01) (20 - 20)  SpO2: 99% (17 Oct 2017 16:01) (99% - 99%)    General Exam:   General appearance: No acute distress                   Neurological Exam:  Mental Status: Orientated to self, date and place.    No dysarthria, aphasia or neglect.      Cranial Nerves: CN I - not tested.  PERRL, EOMI, VFF, no nystagmus or diplopia.  No APD.    No facial asymmetry.     Motor:   Tone: normal.                  Strength:     [] Upper extremity                      Delt       Bicep    Tricep                                                  R         5/5        5/5        5/5       5/5                                               L          5/5        5/5        5/5       5/5  [] Lower extremity                       HF          KE          KF        DF         PF                                               R        5/5        5/5        5/5       5/5       5/5                                               L         5/5        5/5       5/5       5/5        5/5  Pronator drift: none                 Dysmetria: BL NL FTN  No truncal ataxia.    Tremor: No resting, postural or action tremor.  No myoclonus.    Sensation: intact to light touch, pinprick, vibration and proprioception    Deep Tendon Reflexes:   Right 2+ : BC, TC, BRD   Left 2+ : BC, TC, BRD  Right 2+ Knee, 1+ ankle  Left 2+ Knee, 1+ ankle    Toes flexor bilaterally  Gait: normal and stable.      Other:    10-17    138  |  98  |  35<H>  ----------------------------<  137<H>  5.0   |  27  |  6.63<H>    Ca    8.0<L>      17 Oct 2017 17:43    TPro  6.9  /  Alb  3.7  /  TBili  0.6  /  DBili  x   /  AST  16  /  ALT  9   /  AlkPhos  52  10-17    10-17    138  |  98  |  35<H>  ----------------------------<  137<H>  5.0   |  27  |  6.63<H>    Ca    8.0<L>      17 Oct 2017 17:43    TPro  6.9  /  Alb  3.7  /  TBili  0.6  /  DBili  x   /  AST  16  /  ALT  9   /  AlkPhos  52  10-17                          11.4   6.74  )-----------( 103      ( 17 Oct 2017 17:43 )             34.0       Radiology    CT: < from: CT Head No Cont (10.17.17 @ 18:25) >  Progressive soft tissue swelling over the left frontotemporoparietal   region with indistinct subgaleal fluid collections. Extensive   opacification of the left mastoid air cells with questionable   indistinctness of the bony walls of several mastoid air cells. Findings   are concerning for necrotizing otitis externa with extension into the   fissures of [place], left parotid and  spaces and associated   coalescent mastoiditis. Evaluation is limited in the absence of   intravenous contrast.    Unchanged enlarged lateral and third ventricles and lower bifrontal   temporal sulci with slight sulcal effacement of the vertex this can be   seen with normal pressure hydrocephalus as well as areas of microvascular   disease. No evidence of intracranial hemorrhage or midline shift.    Multiple air-fluid levels within the sphenoid sinus, underlying sphenoid   sinusitis is not excluded.    < end of copied text > This is an incomplete note  HPI:  The patient is a 77 yo m, with pmhx of CAD on dual antiplatelet therapy, ESRD on HD, HTN P/W 3 days onset of confusion and lethargy as well as left sided weakness. Today during HD reportedly he was more confused and the wife noticed more pronounced left sided weakness. In ED he is improved back to his baseline. At baseline he A&O x 1 with complete dependency on self care.      MEDICATIONS  (STANDING):  piperacillin/tazobactam IVPB. 3.375 Gram(s) IV Intermittent once    MEDICATIONS  (PRN):    PAST MEDICAL & SURGICAL HISTORY:  PVD (peripheral vascular disease)  CAD (coronary artery disease): s/p 1 stent  HLD (hyperlipidemia)  HTN (hypertension)  DM (diabetes mellitus)  ESRD (end stage renal disease)  H/O foot surgery: sec to ankle fracture  S/P cataract extraction  S/P coronary artery stent placement  S/P cholecystectomy  AVF (arteriovenous fistula)    FAMILY HISTORY:  No pertinent family history in first degree relatives    Allergies    No Known Allergies    Intolerances        SHx - No smoking, No ETOH, No drug abuse    Review of Systems:  CONSTITUTIONAL:  No weight loss, fever, chills, weakness or fatigue.  HEENT:  Eyes:  No visual loss, blurred vision, double vision or yellow sclerae. Ears, Nose, Throat:  No hearing loss, sneezing, congestion, runny nose or sore throat.  CARDIOVASCULAR:  No chest pain, chest pressure or chest discomfort. No palpitations or edema.  GASTROINTESTINAL:  No anorexia, nausea, vomiting or diarrhea. No abdominal pain or blood.  NEUROLOGICAL: See HPI  MUSCULOSKELETAL:  No muscle, back pain, joint pain or stiffness.  PSYCHIATRIC:  No history of depression or anxiety.      Vital Signs Last 24 Hrs  T(C): 37 (17 Oct 2017 16:01), Max: 37 (17 Oct 2017 16:01)  T(F): 98.6 (17 Oct 2017 16:01), Max: 98.6 (17 Oct 2017 16:01)  HR: 56 (17 Oct 2017 16:01) (56 - 56)  BP: 231/80 (17 Oct 2017 16:01) (231/80 - 231/80)  BP(mean): --  RR: 20 (17 Oct 2017 16:01) (20 - 20)  SpO2: 99% (17 Oct 2017 16:01) (99% - 99%)    General Exam:   General appearance: No acute distress, left sided facial swelling.                   Neurological Exam:  Mental Status: Alert, oriented to self and place (Knows he is in hospital, does not know which), follows commands, cooperative with the exam  No dysarthria, aphasia.    Cranial Nerves: CN I - not tested.  PERRL, EOMI, VFF, no nystagmus or diplopia.  No APD.    Mild facial asymmetry, likely due to swelling.     Motor:   Tone: normal.                  Strength:     [] Upper extremity: 5/5 excpet bilateral shoulder, L>R, shoulder ROM decreased with decreased force to 4/5  [] Lower extremity: No drift, 5/5  Pronator drift: none                 Dysmetria: BL NL FTN  No truncal ataxia.    Tremor: No resting, postural or action tremor.  No myoclonus.    Sensation: intact to light touch.    Deep Tendon Reflexes:   Right 2+ : BC, TC, BRD   Left 2+ : BC, TC, BRD  Right 2+ Knee, 1+ ankle  Left 2+ Knee, 1+ ankle    Toes flexor bilaterally  Other:    10-17    138  |  98  |  35<H>  ----------------------------<  137<H>  5.0   |  27  |  6.63<H>    Ca    8.0<L>      17 Oct 2017 17:43    TPro  6.9  /  Alb  3.7  /  TBili  0.6  /  DBili  x   /  AST  16  /  ALT  9   /  AlkPhos  52  10-17    10-17    138  |  98  |  35<H>  ----------------------------<  137<H>  5.0   |  27  |  6.63<H>    Ca    8.0<L>      17 Oct 2017 17:43    TPro  6.9  /  Alb  3.7  /  TBili  0.6  /  DBili  x   /  AST  16  /  ALT  9   /  AlkPhos  52  10-17                          11.4   6.74  )-----------( 103      ( 17 Oct 2017 17:43 )             34.0       Radiology    CT: < from: CT Head No Cont (10.17.17 @ 18:25) >  Progressive soft tissue swelling over the left frontotemporoparietal   region with indistinct subgaleal fluid collections. Extensive   opacification of the left mastoid air cells with questionable   indistinctness of the bony walls of several mastoid air cells. Findings   are concerning for necrotizing otitis externa with extension into the   fissures of [place], left parotid and  spaces and associated   coalescent mastoiditis. Evaluation is limited in the absence of   intravenous contrast.    Unchanged enlarged lateral and third ventricles and lower bifrontal   temporal sulci with slight sulcal effacement of the vertex this can be   seen with normal pressure hydrocephalus as well as areas of microvascular   disease. No evidence of intracranial hemorrhage or midline shift.    Multiple air-fluid levels within the sphenoid sinus, underlying sphenoid   sinusitis is not excluded.    < end of copied text >

## 2017-10-17 NOTE — CONSULT NOTE ADULT - SUBJECTIVE AND OBJECTIVE BOX
75 yo male with HTN, CAD x 1 stent, ESRD on dialysis (tuesday, thursday, saturday) received half of dialysis today presented with left side weakness, stroke code called. ORL called because of noticeable left facial swelling and left ear swelling. Has had it for 2 weeeks, slowly increasing. No prior ear infections.  No otorrhea, no tinntisu, no vertigo. Only ear pain. No hx of ear infections. Has not take abx for it      PE:  aVSS, nad  resting no stridor  nares patent  Left facial swelling (tempoparietal)  left ear pinnae swelling, eac thickened, debris/cerumen obstructing TM, mastoid flat  right ear normal  neck soft/flat  OP normal

## 2017-10-17 NOTE — CHART NOTE - NSCHARTNOTEFT_GEN_A_CORE
called by RN elevated BP. D/w RN, wife at bedside, pt due for clonidine 0.2  mg PO (home med), will order now and recheck BP. Pt to be admitted.

## 2017-10-17 NOTE — ED ADULT NURSE NOTE - OBJECTIVE STATEMENT
pt is a 75 y/o male brought in from dialysis as per wife pt was in the middle of dialysis when she was told by the nurse that he was not responding to her verbally. pt AAOx1. As per wife he is normally confused. pt only knows his name and birth date. As per wife, pt has 1/2hr of dialysis when this started. pt noted to have swelling to left side of his face which wife states has been there for 2 weeks. pt have AV fistula to left upper extremity.

## 2017-10-17 NOTE — ED PROVIDER NOTE - PHYSICAL EXAMINATION
NAD. responds to questions. Unaware of date and year. A& ox1  Neuro: mild weakness noted to the left UE. sensory intact. No pronator drift. Strength 5/5 in RUE, RLE, LLE.   Skin/ Face; Swelling noted to left side of face with overlying erythema, and warmth.  ?abscess.

## 2017-10-17 NOTE — ED ADULT NURSE NOTE - NS TRANSFER PATIENT BELONGINGS
Wrist Watch/pt has on 1 yellow metal bracelet and 2 yellow metal necklaces/Jewelry/Money (specify)/Clothing

## 2017-10-17 NOTE — ED PROVIDER NOTE - ATTENDING CONTRIBUTION TO CARE
marian: hx from family, ems and nephrologist.   past two weeks has swelling to rt face.   today at 11am as his wife was taking him out of bed, noted new onset left leg weakness. last time he had ambulated was 6pm day before. at same time, he ws less conversant and approximately 1pm he stopped talking. had only 30 minutes of dialysis and sent to ED. according to dialysis center, he became less responsive while at dialysis center.   on arrival, pt had wakened and was back to baseline mental status according to wife. repotdedly, glucose was not low PTA.   exam: answers simple questions. not oriented to time (baseline according to wife).  there is moderate rt facial and rt ear swelling; red and slightly warm. there is a small pustule(??) tp left temporal region.   str 5/5 all ext.   exam otherwise unremarkable.   impression: etio of pts transient weakness and lack of talking??. TIA? no evidence of CVA at this time, and in addition, pt was out of TPA window on ED arrival.  Possible facial cellulitis .  recc: CT head, neurology consult, IV antibiotics, admission. US left temporal region. marian: hx from family, ems and nephrologist.   past two weeks has swelling to rt face.   today at 11am as his wife was taking him out of bed, noted new onset left leg weakness. last time he had ambulated was 6pm day before. at same time, he ws less conversant and approximately 1pm he stopped talking. had only 30 minutes of dialysis and sent to ED. according to dialysis center, he became less responsive while at dialysis center.   on arrival, pt had wakened and was back to baseline mental status according to wife. repotdedly, glucose was not low PTA.   exam: answers simple questions. not oriented to time (baseline according to wife).  there is moderate rt facial and rt ear swelling; red and slightly warm. there is a small pustule(??) tp left temporal region.   str 5/5 all ext.   exam otherwise unremarkable.   impression: etio of pts transient weakness and lack of talking??. TIA? no evidence of CVA at this time, and in addition, pt was out of TPA window on ED arrival.  Possible facial cellulitis .  recc: CT head, neurology consult, IV antibiotics, admission. US left temporal region..

## 2017-10-17 NOTE — CONSULT NOTE ADULT - PROBLEM SELECTOR RECOMMENDATION 3
Hb acceptable. Will restart Epo 4K with HD. Monitor Hb. CBC pending. Will restart Epo 4K with HD pending results. Monitor Hb.

## 2017-10-17 NOTE — CONSULT NOTE ADULT - SUBJECTIVE AND OBJECTIVE BOX
[facility] NEPHROLOGY- CONSULTATION NOTE    76 year old male with history of below presents with left facial swelling and decreased responsiveness at HD today. Patient well known to nephrology with h/o ESRD on HD TTS at [facility] ([facility]) for which he follows with me. Patient apparently developed left facial swelling approximately two weeks ago for which patient was advised to get evaluated at ER or urgent care setting as no longer follows with his PMD. Patient however continued to refuse until today where 30 minutes into HD treatment, was noted to be lethargic and sent to [facility] ER for further evaluation.      REVIEW OF SYSTEMS:  Gen: no changes in weight  HEENT: no rhinorrhea, + L facial swelling  Neck: no sore throat  Cards: no chest pain  Resp: no dyspnea  GI: no nausea or vomiting or diarrhea  : no dysuria or hematuria  Vascular: no LE edema  Derm: no rashes  Neuro: no numbness/tingling    No Known Allergies      Home Medications Reviewed  Hospital Medications:   MEDICATIONS  (STANDING):  clindamycin IVPB 900 milliGRAM(s) IV Intermittent Once      PAST MEDICAL & SURGICAL HISTORY:  PVD (peripheral vascular disease)  CAD (coronary artery disease): s/p 1 stent  HLD (hyperlipidemia)  HTN (hypertension)  DM (diabetes mellitus)  ESRD (end stage renal disease)  H/O foot surgery: sec to ankle fracture  S/P cataract extraction  S/P coronary artery stent placement  S/P cholecystectomy  AVF (arteriovenous fistula)      FAMILY HISTORY:  No pertinent family history in first degree relatives      SOCIAL HISTORY:  Denies toxic substance use     VITALS:  T(F): 98.6 (10-17-17 @ 16:01), Max: 98.6 (10-17-17 @ 16:01)  HR: 56 (10-17-17 @ 16:01)  BP: 231/80 (10-17-17 @ 16:01)  RR: 20 (10-17-17 @ 16:01)  SpO2: 99% (10-17-17 @ 16:01)  Wt(kg): --      PHYSICAL EXAM:  Gen: NAD, calm, L facial swelling  HEENT: MMM  Neck: no JVD  Cards: RRR, +S1/S2, no M/G/R  Resp: CTA B/L  GI: soft, NT/ND, NABS  : no CVA tenderness  Vascular: no LE edema B/L, LUE AVF + bruit/thrill  Derm: no rashes  Neuro: non-focal    LABS: Pending

## 2017-10-18 DIAGNOSIS — E11.9 TYPE 2 DIABETES MELLITUS WITHOUT COMPLICATIONS: ICD-10-CM

## 2017-10-18 DIAGNOSIS — M25.512 PAIN IN LEFT SHOULDER: ICD-10-CM

## 2017-10-18 DIAGNOSIS — I10 ESSENTIAL (PRIMARY) HYPERTENSION: ICD-10-CM

## 2017-10-18 DIAGNOSIS — I25.10 ATHEROSCLEROTIC HEART DISEASE OF NATIVE CORONARY ARTERY WITHOUT ANGINA PECTORIS: ICD-10-CM

## 2017-10-18 DIAGNOSIS — E78.5 HYPERLIPIDEMIA, UNSPECIFIED: ICD-10-CM

## 2017-10-18 DIAGNOSIS — R53.1 WEAKNESS: ICD-10-CM

## 2017-10-18 DIAGNOSIS — D69.6 THROMBOCYTOPENIA, UNSPECIFIED: ICD-10-CM

## 2017-10-18 DIAGNOSIS — Z29.9 ENCOUNTER FOR PROPHYLACTIC MEASURES, UNSPECIFIED: ICD-10-CM

## 2017-10-18 DIAGNOSIS — L03.211 CELLULITIS OF FACE: ICD-10-CM

## 2017-10-18 LAB
ALBUMIN SERPL ELPH-MCNC: 3.4 G/DL — SIGNIFICANT CHANGE UP (ref 3.3–5)
ALP SERPL-CCNC: 48 U/L — SIGNIFICANT CHANGE UP (ref 40–120)
ALT FLD-CCNC: 10 U/L — SIGNIFICANT CHANGE UP (ref 4–41)
AST SERPL-CCNC: 16 U/L — SIGNIFICANT CHANGE UP (ref 4–40)
BASOPHILS # BLD AUTO: 0.07 K/UL — SIGNIFICANT CHANGE UP (ref 0–0.2)
BASOPHILS NFR BLD AUTO: 1 % — SIGNIFICANT CHANGE UP (ref 0–2)
BILIRUB SERPL-MCNC: 0.5 MG/DL — SIGNIFICANT CHANGE UP (ref 0.2–1.2)
BUN SERPL-MCNC: 39 MG/DL — HIGH (ref 7–23)
CALCIUM SERPL-MCNC: 7.9 MG/DL — LOW (ref 8.4–10.5)
CHLORIDE SERPL-SCNC: 97 MMOL/L — LOW (ref 98–107)
CHOLEST SERPL-MCNC: 140 MG/DL — SIGNIFICANT CHANGE UP (ref 120–199)
CO2 SERPL-SCNC: 27 MMOL/L — SIGNIFICANT CHANGE UP (ref 22–31)
CREAT SERPL-MCNC: 7.58 MG/DL — HIGH (ref 0.5–1.3)
EOSINOPHIL # BLD AUTO: 0.39 K/UL — SIGNIFICANT CHANGE UP (ref 0–0.5)
EOSINOPHIL NFR BLD AUTO: 5.8 % — SIGNIFICANT CHANGE UP (ref 0–6)
FERRITIN SERPL-MCNC: 1106 NG/ML — HIGH (ref 30–400)
FOLATE SERPL-MCNC: 10.6 NG/ML — SIGNIFICANT CHANGE UP (ref 4.7–20)
FRUCTOSAMINE SERPL-MCNC: 398 UMOL/L — HIGH (ref 205–285)
GLUCOSE BLDC GLUCOMTR-MCNC: 136 MG/DL — HIGH (ref 70–99)
GLUCOSE BLDC GLUCOMTR-MCNC: 224 MG/DL — HIGH (ref 70–99)
GLUCOSE BLDC GLUCOMTR-MCNC: 244 MG/DL — HIGH (ref 70–99)
GLUCOSE SERPL-MCNC: 151 MG/DL — HIGH (ref 70–99)
HAV IGM SER-ACNC: NONREACTIVE — SIGNIFICANT CHANGE UP
HBA1C BLD-MCNC: 5.8 % — HIGH (ref 4–5.6)
HBV CORE IGM SER-ACNC: NONREACTIVE — SIGNIFICANT CHANGE UP
HBV SURFACE AG SER-ACNC: NEGATIVE — SIGNIFICANT CHANGE UP
HBV SURFACE AG SER-ACNC: NONREACTIVE — SIGNIFICANT CHANGE UP
HCT VFR BLD CALC: 33.4 % — LOW (ref 39–50)
HCV AB S/CO SERPL IA: 0.18 S/CO — SIGNIFICANT CHANGE UP
HCV AB SERPL-IMP: SIGNIFICANT CHANGE UP
HDLC SERPL-MCNC: 27 MG/DL — LOW (ref 35–55)
HGB BLD-MCNC: 10.8 G/DL — LOW (ref 13–17)
IMM GRANULOCYTES # BLD AUTO: 0.02 # — SIGNIFICANT CHANGE UP
IMM GRANULOCYTES NFR BLD AUTO: 0.3 % — SIGNIFICANT CHANGE UP (ref 0–1.5)
IRON SATN MFR SERPL: 146 UG/DL — LOW (ref 155–535)
IRON SATN MFR SERPL: 36 UG/DL — LOW (ref 45–165)
LIPID PNL WITH DIRECT LDL SERPL: 99 MG/DL — SIGNIFICANT CHANGE UP
LYMPHOCYTES # BLD AUTO: 2.03 K/UL — SIGNIFICANT CHANGE UP (ref 1–3.3)
LYMPHOCYTES # BLD AUTO: 30.2 % — SIGNIFICANT CHANGE UP (ref 13–44)
MAGNESIUM SERPL-MCNC: 2.2 MG/DL — SIGNIFICANT CHANGE UP (ref 1.6–2.6)
MCHC RBC-ENTMCNC: 30.9 PG — SIGNIFICANT CHANGE UP (ref 27–34)
MCHC RBC-ENTMCNC: 32.3 % — SIGNIFICANT CHANGE UP (ref 32–36)
MCV RBC AUTO: 95.4 FL — SIGNIFICANT CHANGE UP (ref 80–100)
MONOCYTES # BLD AUTO: 0.63 K/UL — SIGNIFICANT CHANGE UP (ref 0–0.9)
MONOCYTES NFR BLD AUTO: 9.4 % — SIGNIFICANT CHANGE UP (ref 2–14)
NEUTROPHILS # BLD AUTO: 3.59 K/UL — SIGNIFICANT CHANGE UP (ref 1.8–7.4)
NEUTROPHILS NFR BLD AUTO: 53.3 % — SIGNIFICANT CHANGE UP (ref 43–77)
NRBC # FLD: 0 — SIGNIFICANT CHANGE UP
PHOSPHATE SERPL-MCNC: 3.9 MG/DL — SIGNIFICANT CHANGE UP (ref 2.5–4.5)
PLATELET # BLD AUTO: 97 K/UL — LOW (ref 150–400)
PMV BLD: 10.5 FL — SIGNIFICANT CHANGE UP (ref 7–13)
POTASSIUM SERPL-MCNC: 5.5 MMOL/L — HIGH (ref 3.5–5.3)
POTASSIUM SERPL-SCNC: 5.5 MMOL/L — HIGH (ref 3.5–5.3)
PROT SERPL-MCNC: 6.5 G/DL — SIGNIFICANT CHANGE UP (ref 6–8.3)
RBC # BLD: 3.5 M/UL — LOW (ref 4.2–5.8)
RBC # FLD: 13.8 % — SIGNIFICANT CHANGE UP (ref 10.3–14.5)
SODIUM SERPL-SCNC: 138 MMOL/L — SIGNIFICANT CHANGE UP (ref 135–145)
TRIGL SERPL-MCNC: 111 MG/DL — SIGNIFICANT CHANGE UP (ref 10–149)
TSH SERPL-MCNC: 2.13 UIU/ML — SIGNIFICANT CHANGE UP (ref 0.27–4.2)
UIBC SERPL-MCNC: 110 UG/DL — SIGNIFICANT CHANGE UP (ref 110–370)
VIT B12 SERPL-MCNC: 123 PG/ML — LOW (ref 200–900)
WBC # BLD: 6.73 K/UL — SIGNIFICANT CHANGE UP (ref 3.8–10.5)
WBC # FLD AUTO: 6.73 K/UL — SIGNIFICANT CHANGE UP (ref 3.8–10.5)

## 2017-10-18 PROCEDURE — 71020: CPT | Mod: 26

## 2017-10-18 PROCEDURE — 70547 MR ANGIOGRAPHY NECK W/O DYE: CPT | Mod: 26,59

## 2017-10-18 PROCEDURE — 99222 1ST HOSP IP/OBS MODERATE 55: CPT | Mod: GC

## 2017-10-18 PROCEDURE — 73030 X-RAY EXAM OF SHOULDER: CPT | Mod: 26,LT

## 2017-10-18 PROCEDURE — 93010 ELECTROCARDIOGRAM REPORT: CPT

## 2017-10-18 PROCEDURE — 70544 MR ANGIOGRAPHY HEAD W/O DYE: CPT | Mod: 26,59

## 2017-10-18 PROCEDURE — 70540 MRI ORBIT/FACE/NECK W/O DYE: CPT | Mod: 26

## 2017-10-18 PROCEDURE — 99223 1ST HOSP IP/OBS HIGH 75: CPT

## 2017-10-18 PROCEDURE — 70551 MRI BRAIN STEM W/O DYE: CPT | Mod: 26

## 2017-10-18 RX ORDER — PANTOPRAZOLE SODIUM 20 MG/1
40 TABLET, DELAYED RELEASE ORAL
Qty: 0 | Refills: 0 | Status: DISCONTINUED | OUTPATIENT
Start: 2017-10-18 | End: 2017-10-27

## 2017-10-18 RX ORDER — SODIUM CHLORIDE 9 MG/ML
1000 INJECTION, SOLUTION INTRAVENOUS
Qty: 0 | Refills: 0 | Status: DISCONTINUED | OUTPATIENT
Start: 2017-10-18 | End: 2017-10-27

## 2017-10-18 RX ORDER — ASPIRIN/CALCIUM CARB/MAGNESIUM 324 MG
81 TABLET ORAL DAILY
Qty: 0 | Refills: 0 | Status: DISCONTINUED | OUTPATIENT
Start: 2017-10-18 | End: 2017-10-27

## 2017-10-18 RX ORDER — NIFEDIPINE 30 MG
60 TABLET, EXTENDED RELEASE 24 HR ORAL
Qty: 0 | Refills: 0 | COMMUNITY

## 2017-10-18 RX ORDER — DEXTROSE 50 % IN WATER 50 %
1 SYRINGE (ML) INTRAVENOUS ONCE
Qty: 0 | Refills: 0 | Status: DISCONTINUED | OUTPATIENT
Start: 2017-10-18 | End: 2017-10-27

## 2017-10-18 RX ORDER — GLUCAGON INJECTION, SOLUTION 0.5 MG/.1ML
1 INJECTION, SOLUTION SUBCUTANEOUS ONCE
Qty: 0 | Refills: 0 | Status: DISCONTINUED | OUTPATIENT
Start: 2017-10-18 | End: 2017-10-27

## 2017-10-18 RX ORDER — NIFEDIPINE 30 MG
90 TABLET, EXTENDED RELEASE 24 HR ORAL DAILY
Qty: 0 | Refills: 0 | Status: DISCONTINUED | OUTPATIENT
Start: 2017-10-19 | End: 2017-10-20

## 2017-10-18 RX ORDER — METOPROLOL TARTRATE 50 MG
1 TABLET ORAL
Qty: 0 | Refills: 0 | COMMUNITY

## 2017-10-18 RX ORDER — CIPROFLOXACIN AND DEXAMETHASONE 3; 1 MG/ML; MG/ML
4 SUSPENSION/ DROPS AURICULAR (OTIC)
Qty: 0 | Refills: 0 | Status: DISCONTINUED | OUTPATIENT
Start: 2017-10-18 | End: 2017-10-27

## 2017-10-18 RX ORDER — INSULIN LISPRO 100/ML
VIAL (ML) SUBCUTANEOUS
Qty: 0 | Refills: 0 | Status: DISCONTINUED | OUTPATIENT
Start: 2017-10-18 | End: 2017-10-18

## 2017-10-18 RX ORDER — DOCUSATE SODIUM 100 MG
100 CAPSULE ORAL THREE TIMES A DAY
Qty: 0 | Refills: 0 | Status: DISCONTINUED | OUTPATIENT
Start: 2017-10-18 | End: 2017-10-18

## 2017-10-18 RX ORDER — PIPERACILLIN AND TAZOBACTAM 4; .5 G/20ML; G/20ML
3.38 INJECTION, POWDER, LYOPHILIZED, FOR SOLUTION INTRAVENOUS EVERY 12 HOURS
Qty: 0 | Refills: 0 | Status: DISCONTINUED | OUTPATIENT
Start: 2017-10-18 | End: 2017-10-19

## 2017-10-18 RX ORDER — INSULIN LISPRO 100/ML
VIAL (ML) SUBCUTANEOUS AT BEDTIME
Qty: 0 | Refills: 0 | Status: DISCONTINUED | OUTPATIENT
Start: 2017-10-18 | End: 2017-10-27

## 2017-10-18 RX ORDER — DEXTROSE 50 % IN WATER 50 %
25 SYRINGE (ML) INTRAVENOUS ONCE
Qty: 0 | Refills: 0 | Status: DISCONTINUED | OUTPATIENT
Start: 2017-10-18 | End: 2017-10-27

## 2017-10-18 RX ORDER — METOPROLOL TARTRATE 50 MG
25 TABLET ORAL
Qty: 0 | Refills: 0 | Status: DISCONTINUED | OUTPATIENT
Start: 2017-10-18 | End: 2017-10-19

## 2017-10-18 RX ORDER — LACTOBACILLUS ACIDOPHILUS 100MM CELL
1 CAPSULE ORAL EVERY 12 HOURS
Qty: 0 | Refills: 0 | Status: DISCONTINUED | OUTPATIENT
Start: 2017-10-18 | End: 2017-10-27

## 2017-10-18 RX ORDER — METOPROLOL TARTRATE 50 MG
50 TABLET ORAL
Qty: 0 | Refills: 0 | Status: DISCONTINUED | OUTPATIENT
Start: 2017-10-18 | End: 2017-10-18

## 2017-10-18 RX ORDER — CLOPIDOGREL BISULFATE 75 MG/1
75 TABLET, FILM COATED ORAL DAILY
Qty: 0 | Refills: 0 | Status: DISCONTINUED | OUTPATIENT
Start: 2017-10-18 | End: 2017-10-18

## 2017-10-18 RX ORDER — ATORVASTATIN CALCIUM 80 MG/1
80 TABLET, FILM COATED ORAL AT BEDTIME
Qty: 0 | Refills: 0 | Status: DISCONTINUED | OUTPATIENT
Start: 2017-10-18 | End: 2017-10-27

## 2017-10-18 RX ORDER — CEFTAZIDIME 6 G/30ML
2 INJECTION, POWDER, FOR SOLUTION INTRAVENOUS
Qty: 0 | Refills: 0 | Status: DISCONTINUED | OUTPATIENT
Start: 2017-10-18 | End: 2017-10-20

## 2017-10-18 RX ORDER — SENNA PLUS 8.6 MG/1
2 TABLET ORAL AT BEDTIME
Qty: 0 | Refills: 0 | Status: DISCONTINUED | OUTPATIENT
Start: 2017-10-18 | End: 2017-10-18

## 2017-10-18 RX ORDER — CILOSTAZOL 100 MG/1
50 TABLET ORAL
Qty: 0 | Refills: 0 | Status: DISCONTINUED | OUTPATIENT
Start: 2017-10-18 | End: 2017-10-18

## 2017-10-18 RX ORDER — VANCOMYCIN HCL 1 G
500 VIAL (EA) INTRAVENOUS
Qty: 0 | Refills: 0 | Status: COMPLETED | OUTPATIENT
Start: 2017-10-18 | End: 2017-10-18

## 2017-10-18 RX ORDER — NIFEDIPINE 30 MG
60 TABLET, EXTENDED RELEASE 24 HR ORAL DAILY
Qty: 0 | Refills: 0 | Status: DISCONTINUED | OUTPATIENT
Start: 2017-10-18 | End: 2017-10-18

## 2017-10-18 RX ORDER — HYDRALAZINE HCL 50 MG
50 TABLET ORAL EVERY 6 HOURS
Qty: 0 | Refills: 0 | Status: DISCONTINUED | OUTPATIENT
Start: 2017-10-18 | End: 2017-10-18

## 2017-10-18 RX ORDER — DEXTROSE 50 % IN WATER 50 %
12.5 SYRINGE (ML) INTRAVENOUS ONCE
Qty: 0 | Refills: 0 | Status: DISCONTINUED | OUTPATIENT
Start: 2017-10-18 | End: 2017-10-27

## 2017-10-18 RX ORDER — ERYTHROPOIETIN 10000 [IU]/ML
3000 INJECTION, SOLUTION INTRAVENOUS; SUBCUTANEOUS
Qty: 0 | Refills: 0 | Status: DISCONTINUED | OUTPATIENT
Start: 2017-10-18 | End: 2017-10-25

## 2017-10-18 RX ADMIN — Medication 0.2 MILLIGRAM(S): at 05:35

## 2017-10-18 RX ADMIN — PIPERACILLIN AND TAZOBACTAM 25 GRAM(S): 4; .5 INJECTION, POWDER, LYOPHILIZED, FOR SOLUTION INTRAVENOUS at 05:35

## 2017-10-18 RX ADMIN — PIPERACILLIN AND TAZOBACTAM 25 GRAM(S): 4; .5 INJECTION, POWDER, LYOPHILIZED, FOR SOLUTION INTRAVENOUS at 18:18

## 2017-10-18 RX ADMIN — Medication 81 MILLIGRAM(S): at 12:54

## 2017-10-18 RX ADMIN — PANTOPRAZOLE SODIUM 40 MILLIGRAM(S): 20 TABLET, DELAYED RELEASE ORAL at 05:36

## 2017-10-18 RX ADMIN — Medication 1 TABLET(S): at 12:56

## 2017-10-18 RX ADMIN — CIPROFLOXACIN AND DEXAMETHASONE 4 DROP(S): 3; 1 SUSPENSION/ DROPS AURICULAR (OTIC) at 23:16

## 2017-10-18 RX ADMIN — Medication 60 MILLIGRAM(S): at 05:35

## 2017-10-18 RX ADMIN — CEFTAZIDIME 100 GRAM(S): 6 INJECTION, POWDER, FOR SOLUTION INTRAVENOUS at 23:16

## 2017-10-18 RX ADMIN — CIPROFLOXACIN AND DEXAMETHASONE 4 DROP(S): 3; 1 SUSPENSION/ DROPS AURICULAR (OTIC) at 12:54

## 2017-10-18 RX ADMIN — Medication 1 TABLET(S): at 05:35

## 2017-10-18 RX ADMIN — Medication 100 MILLIGRAM(S): at 23:16

## 2017-10-18 RX ADMIN — ERYTHROPOIETIN 3000 UNIT(S): 10000 INJECTION, SOLUTION INTRAVENOUS; SUBCUTANEOUS at 20:52

## 2017-10-18 RX ADMIN — CIPROFLOXACIN AND DEXAMETHASONE 4 DROP(S): 3; 1 SUSPENSION/ DROPS AURICULAR (OTIC) at 18:18

## 2017-10-18 RX ADMIN — Medication 1 TABLET(S): at 18:17

## 2017-10-18 RX ADMIN — Medication 50 MILLIGRAM(S): at 05:35

## 2017-10-18 RX ADMIN — CIPROFLOXACIN AND DEXAMETHASONE 4 DROP(S): 3; 1 SUSPENSION/ DROPS AURICULAR (OTIC) at 05:35

## 2017-10-18 NOTE — H&P ADULT - ATTENDING COMMENTS
Pt was seen & examined by me, Dr. JENNIFER Smith on 10/18/17.     Dr. Ross will resume the care of pt in the morning.

## 2017-10-18 NOTE — CONSULT NOTE ADULT - SUBJECTIVE AND OBJECTIVE BOX
CARDIOLOGY CONSULT - Dr. Flaherty     CHIEF COMPLAINT:    HPI:  77 yo male , with PMHx  as mentioned below presented with 3 days of lethargy and weakness (L>R) followed sudden onset of encephalopathy which described as unresponsiveness, during dialysis as well as worsening left sided weakness.  HX mostly obtained from wife, pt is A+O x2, pt c/o left shoulder pain, no CP, no SOB, no cough, no N/V, no dysuria, no HA, no dizziness, no abdominal pain, still making urine, + Left side of face swollen with erythema,     PAST MEDICAL & SURGICAL HISTORY:  Dementia  Thrombocytopenia  PVD (peripheral vascular disease)  CAD (coronary artery disease): s/p 1 stent  HLD (hyperlipidemia)  HTN (hypertension)  DM (diabetes mellitus)  ESRD (end stage renal disease)  H/O foot surgery: sec to ankle fracture  S/P cataract extraction  S/P coronary artery stent placement  S/P cholecystectomy  AVF (arteriovenous fistula)          PREVIOUS DIAGNOSTIC TESTING:    [ ] Echocardiogram:  [ ]  Catheterization:  [ ] Stress Test:  	    MEDICATIONS:  MEDICATIONS  (STANDING):  aspirin enteric coated 81 milliGRAM(s) Oral daily  ciprofloxacin/dexamethasone Suspension Otic 4 Drop(s) Left Ear four times a day  cloNIDine 0.1 milliGRAM(s) Oral two times a day  dextrose 5%. 1000 milliLiter(s) (50 mL/Hr) IV Continuous <Continuous>  dextrose 50% Injectable 12.5 Gram(s) IV Push once  dextrose 50% Injectable 25 Gram(s) IV Push once  dextrose 50% Injectable 25 Gram(s) IV Push once  epoetin estelle Injectable 3000 Unit(s) IV Push <User Schedule>  insulin lispro (HumaLOG) corrective regimen sliding scale   SubCutaneous at bedtime  lactobacillus acidophilus 1 Tablet(s) Oral every 12 hours  metoprolol 25 milliGRAM(s) Oral two times a day  Nephro-emil 1 Tablet(s) Oral daily  pantoprazole    Tablet 40 milliGRAM(s) Oral before breakfast  piperacillin/tazobactam IVPB. 3.375 Gram(s) IV Intermittent every 12 hours      FAMILY HISTORY:  No pertinent family history in first degree relatives      SOCIAL HISTORY:    [ ] Non-smoker  [ ] Smoker  [ ] Alcohol    Allergies    No Known Allergies    Intolerances    	    REVIEW OF SYSTEMS:  CONSTITUTIONAL: No fever, weight loss, or fatigue  EYES: No eye pain, visual disturbances, or discharge  ENMT:  No difficulty hearing, tinnitus, vertigo; No sinus or throat pain  NECK: No pain or stiffness  RESPIRATORY: No cough, wheezing, chills or hemoptysis; No Shortness of Breath  CARDIOVASCULAR: No chest pain, palpitations, passing out, dizziness, or leg swelling  GASTROINTESTINAL: No abdominal or epigastric pain. No nausea, vomiting, or hematemesis; No diarrhea or constipation. No melena or hematochezia.  GENITOURINARY: No dysuria, frequency, hematuria, or incontinence  NEUROLOGICAL: No headaches, memory loss, loss of strength, numbness, or tremors  SKIN: No itching, burning, rashes, or lesions   	    [ ] All others negative	  [ ] Unable to obtain    PHYSICAL EXAM:  T(C): 36.3 (10-18-17 @ 10:21), Max: 37 (10-17-17 @ 16:01)  HR: 55 (10-18-17 @ 10:21) (53 - 65)  BP: 133/52 (10-18-17 @ 10:21) (133/52 - 231/80)  RR: 18 (10-18-17 @ 10:21) (18 - 20)  SpO2: 99% (10-18-17 @ 10:21) (98% - 100%)  Wt(kg): --  I&O's Summary      Appearance: Normal	  Psychiatry: A & O x 3, Mood & affect appropriate  HEENT:   Normal oral mucosa, PERRL, EOMI	  Lymphatic: No lymphadenopathy  Cardiovascular: Normal S1 S2,RRR, No JVD, No murmurs  Respiratory: Lungs clear to auscultation	  Gastrointestinal:  Soft, Non-tender, + BS	  Skin: No rashes, No ecchymoses, No cyanosis	  Neurologic: Non-focal  Extremities: Normal range of motion, No clubbing, cyanosis or edema  Vascular: Peripheral pulses palpable 2+ bilaterally    TELEMETRY: 	    ECG:  	  RADIOLOGY:  OTHER: 	  	  LABS:	 	    CARDIAC MARKERS:                                  10.8   6.73  )-----------( 97       ( 18 Oct 2017 06:00 )             33.4     10-18    138  |  97<L>  |  39<H>  ----------------------------<  151<H>  5.5<H>   |  27  |  7.58<H>    Ca    7.9<L>      18 Oct 2017 06:00  Phos  3.9     10-18  Mg     2.2     10-18    TPro  6.5  /  Alb  3.4  /  TBili  0.5  /  DBili  x   /  AST  16  /  ALT  10  /  AlkPhos  48  10-18    PT/INR - ( 17 Oct 2017 17:43 )   PT: 11.7 SEC;   INR: 1.04          PTT - ( 17 Oct 2017 17:43 )  PTT:33.5 SEC  proBNP:   Lipid Profile:   HgA1c: Hemoglobin A1C, Whole Blood: 5.8 % (10-18 @ 06:00)    TSH: Thyroid Stimulating Hormone, Serum: 2.13 uIU/mL (10-18 @ 06:00) CARDIOLOGY CONSULT - Dr. Flaherty     CHIEF COMPLAINT:    HPI:  75 yo male , with PMHx  as mentioned below presented with 3 days of lethargy and weakness (L>R) followed sudden onset of encephalopathy which described as unresponsiveness, during dialysis as well as worsening left sided weakness.  HX obtained through h&p .  pt is A+O x2, pt currently  c/o left shoulder pain, no CP, no SOB, no cough, no N/V, no dysuria, no HA, no dizziness, no abdominal pain, still making urine, + Left side of face swollen with erythema. Cardiac history as mentioned below . ROS otherwise negative     PAST MEDICAL & SURGICAL HISTORY:  Dementia  Thrombocytopenia  PVD (peripheral vascular disease)  CAD (coronary artery disease): s/p 1 stent  HLD (hyperlipidemia)  HTN (hypertension)  DM (diabetes mellitus)  ESRD (end stage renal disease)  H/O foot surgery: sec to ankle fracture  S/P cataract extraction  S/P coronary artery stent placement  S/P cholecystectomy  AVF (arteriovenous fistula)          PREVIOUS DIAGNOSTIC TESTING:    [ x] Echocardiogram:  < from: TTE Echo Doppler w/o Cont (09.10.17 @ 09:03) >     Summary:   1. Left ventricular ejection fraction, by visual estimation, is 65 to   70%.   2. There is mild asymmetric left ventricular hypertrophy.   3. Mild mitral valve regurgitation.   4. Mild aortic regurgitation.   5. Mildly enlarged left atrium.   6. Mild pulmonic valve regurgitation.      < end of copied text >    [ ]  Catheterization:    [ ] Stress Test:  	      Home Medications:   * Incomplete Medication History as of 18-Oct-2017 03:11 documented in Structured Notes  acetaminophen 325 mg oral tablet: 2 tab(s) orally every 6 hours, As needed, For Temp greater than 38.5 C (101.3 F), Last Dose Taken:    Lopressor 50 mg oral tablet: 1 tab(s) orally 2 times a day, Last Dose Taken:    losartan 25 mg oral tablet: 1 tab(s) orally once a day, Last Dose Taken:    lactobacillus acidophilus oral capsule: 1 tab(s) orally once a day, Last Dose Taken:    aspirin 81 mg oral delayed release tablet: 1 tab(s) orally once a day, Last Dose Taken:    pantoprazole 40 mg oral delayed release tablet: 1 tab(s) orally once a day (before a meal), Last Dose Taken:    Multiple Vitamins oral tablet: 1 tab(s) orally once a day, Last Dose Taken:    glimepiride 2 mg oral tablet: 1 tab(s) orally once a day, Last Dose Taken:    cloNIDine 0.2 mg oral tablet: 1 tab(s) orally 2 times a day, Last Dose Taken:    Procardia XL 60 mg oral tablet, extended release: 1 tab(s) orally once a day      MEDICATIONS:  MEDICATIONS  (STANDING):  aspirin enteric coated 81 milliGRAM(s) Oral daily  ciprofloxacin/dexamethasone Suspension Otic 4 Drop(s) Left Ear four times a day  cloNIDine 0.1 milliGRAM(s) Oral two times a day  dextrose 5%. 1000 milliLiter(s) (50 mL/Hr) IV Continuous <Continuous>  dextrose 50% Injectable 12.5 Gram(s) IV Push once  dextrose 50% Injectable 25 Gram(s) IV Push once  dextrose 50% Injectable 25 Gram(s) IV Push once  epoetin estelle Injectable 3000 Unit(s) IV Push <User Schedule>  insulin lispro (HumaLOG) corrective regimen sliding scale   SubCutaneous at bedtime  lactobacillus acidophilus 1 Tablet(s) Oral every 12 hours  metoprolol 25 milliGRAM(s) Oral two times a day  Nephro-emil 1 Tablet(s) Oral daily  pantoprazole    Tablet 40 milliGRAM(s) Oral before breakfast  piperacillin/tazobactam IVPB. 3.375 Gram(s) IV Intermittent every 12 hours      FAMILY HISTORY:  No pertinent family history in first degree relatives      SOCIAL HISTORY:    [x ] Non-smoker  [ ] Smoker  [ ] Alcohol    Allergies    No Known Allergies    Intolerances    	    REVIEW OF SYSTEMS:  CONSTITUTIONAL: No fever, weight loss, or fatigue  EYES: No eye pain, visual disturbances, or discharge  ENMT:  No difficulty hearing, tinnitus, vertigo; No sinus or throat pain  NECK: No pain or stiffness  RESPIRATORY: No cough, wheezing, chills or hemoptysis; No Shortness of Breath  CARDIOVASCULAR: No chest pain, palpitations, passing out, dizziness, or leg swelling  GASTROINTESTINAL: No abdominal or epigastric pain. No nausea, vomiting, or hematemesis; No diarrhea or constipation. No melena or hematochezia.  GENITOURINARY: No dysuria, frequency, hematuria, or incontinence  NEUROLOGICAL: No headaches, memory loss, loss of strength, numbness, or tremors  SKIN: No itching, burning, rashes, or lesions   	    [ x] All others negative	  [ ] Unable to obtain    PHYSICAL EXAM:  T(C): 36.3 (10-18-17 @ 10:21), Max: 37 (10-17-17 @ 16:01)  HR: 55 (10-18-17 @ 10:21) (53 - 65)  BP: 133/52 (10-18-17 @ 10:21) (133/52 - 231/80)  RR: 18 (10-18-17 @ 10:21) (18 - 20)  SpO2: 99% (10-18-17 @ 10:21) (98% - 100%)  Wt(kg): --  I&O's Summary      Appearance: Normal	  Psychiatry: A & O x 3, Mood & affect appropriate  HEENT:   Normal oral mucosa, PERRL, EOMI	  Lymphatic: No lymphadenopathy  Cardiovascular: Normal S1 S2,RRR, + sys murmur   Respiratory: Lungs clear to auscultation	  Gastrointestinal:  Soft, Non-tender, + BS	  Skin: No rashes, No ecchymoses, No cyanosis	  Neurologic: Non-focal  Extremities: Normal range of motion, No clubbing, cyanosis or edema  Vascular: Peripheral pulses palpable 2+ bilaterally    TELEMETRY: 	    ECG:  	  RADIOLOGY:  OTHER: 	  < from: CT Head No Cont (10.17.17 @ 18:25) >  IMPRESSION:   Progressive soft tissue swelling over the left frontotemporoparietal   region with indistinct subgaleal fluid collections. Extensive   opacification of the left mastoid air cells with questionable   indistinctness of the bony walls of several mastoid air cells. Findings   are concerning for necrotizing otitis externa with extension into the   fissures of [place], left parotid and  spaces and associated   coalescent mastoiditis. Evaluation is limited in the absence of   intravenous contrast.    Unchanged enlarged lateral and third ventricles and lower bifrontal   temporal sulci with slight sulcal effacement of the vertex this can be   seen with normal pressure hydrocephalus as well as areas of microvascular   disease. No evidence of intracranial hemorrhage or midline shift.    Multiple air-fluid levels within the sphenoid sinus, underlying sphenoid   sinusitis is not excluded.    These findings were discussed with Trenton Belle 10/17/2017 at   6:47 PM.    ---------------------------------------------------------------------------------------      < from: MRA Head w/o Cont (10.18.17 @ 09:52) >  IMPRESSION:    New focal infarct in the the left parietal white matter without    hemorrhagic transformation.    Unchanged enlarged lateral and third ventricles and lower bifrontal   temporal sulci with sulcal effacement at the vertex, this may be seen   with normal pressure hydrocephalus. Hyperintense T2 and FLAIR signal   along the ventricular margins may represent transependymal CSF.    Unchanged volume loss with nonspecific foci of lacunar infarction and   white matter hyperintense T2 and FLAIR signal likely microvascular   disease. No new hemorrhage or midline shift.    Left mastoid mastoid opacification with increased extension toward the   left petrous apex and increased soft tissue swelling of the left external   auditory canal, left frontotemporal parietal scalp toward the vertex, and   inferiorly into the left parotid,  and parapharyngeal spaces   concerning for progressive infectious and/or inflammatory change. In   patient with diabetes, a coalescent mastoiditis and a necrotizing otitis   externa and cellulitis possibly from Pseudomonas infection with other   infectious etiologies is not excluded.    Intracranially there is multifocal severe stenosis of the left cavernous   and super clinoid ICA with a dominant right ICA with narrowing likely   from atherosclerotic vascular opacification. There is a dominant right A1   and hypoplastic left A1 likely anatomic variation.    There is a 3.5 cm stenosis along the proximal subclavian artery with left   subclavian steal phenomena with a left vertebral demonstrating absent   flow in the ascending 2-D time-of-flight MRA and presence of flow in the   descending 2-D time-of-flight MRA.    Findings discussedwith Dr. Vilchis at immediate time of review on 10/18/2017   at 12:30 PM.    < end of copied text >          LABS:	 	    CARDIAC MARKERS:                                  10.8   6.73  )-----------( 97       ( 18 Oct 2017 06:00 )             33.4     10-18    138  |  97<L>  |  39<H>  ----------------------------<  151<H>  5.5<H>   |  27  |  7.58<H>    Ca    7.9<L>      18 Oct 2017 06:00  Phos  3.9     10-18  Mg     2.2     10-18    TPro  6.5  /  Alb  3.4  /  TBili  0.5  /  DBili  x   /  AST  16  /  ALT  10  /  AlkPhos  48  10-18    PT/INR - ( 17 Oct 2017 17:43 )   PT: 11.7 SEC;   INR: 1.04          PTT - ( 17 Oct 2017 17:43 )  PTT:33.5 SEC  proBNP:   Lipid Profile:   HgA1c: Hemoglobin A1C, Whole Blood: 5.8 % (10-18 @ 06:00)    TSH: Thyroid Stimulating Hormone, Serum: 2.13 uIU/mL (10-18 @ 06:00) CARDIOLOGY CONSULT - Dr. Flaherty     CHIEF COMPLAINT: left shoulder pain, facial swelling     HPI:  77 yo male , with PMHx  as mentioned below presented with 3 days of lethargy and weakness (L>R) followed sudden onset of encephalopathy which described as unresponsiveness, during dialysis as well as worsening left sided weakness.  HX obtained through h&p .  pt is A+O x2, pt currently  c/o left shoulder pain, no CP, no SOB, no cough, no N/V, no dysuria, no HA, no dizziness, no abdominal pain,  + Left side of face swollen with erythema. Cardiac history as mentioned below . ROS otherwise negative     PAST MEDICAL & SURGICAL HISTORY:  Dementia  Thrombocytopenia  PVD (peripheral vascular disease)  CAD (coronary artery disease): s/p 1 stent  HLD (hyperlipidemia)  HTN (hypertension)  DM (diabetes mellitus)  ESRD (end stage renal disease)  H/O foot surgery: sec to ankle fracture  S/P cataract extraction  S/P coronary artery stent placement  S/P cholecystectomy  AVF (arteriovenous fistula)          PREVIOUS DIAGNOSTIC TESTING:    [ x] Echocardiogram:  < from: TTE Echo Doppler w/o Cont (09.10.17 @ 09:03) >     Summary:   1. Left ventricular ejection fraction, by visual estimation, is 65 to   70%.   2. There is mild asymmetric left ventricular hypertrophy.   3. Mild mitral valve regurgitation.   4. Mild aortic regurgitation.   5. Mildly enlarged left atrium.   6. Mild pulmonic valve regurgitation.      < end of copied text >    [ ]  Catheterization:    [ ] Stress Test:  	      Home Medications:   * Incomplete Medication History as of 18-Oct-2017 03:11 documented in Structured Notes  acetaminophen 325 mg oral tablet: 2 tab(s) orally every 6 hours, As needed, For Temp greater than 38.5 C (101.3 F), Last Dose Taken:    Lopressor 50 mg oral tablet: 1 tab(s) orally 2 times a day, Last Dose Taken:    losartan 25 mg oral tablet: 1 tab(s) orally once a day, Last Dose Taken:    lactobacillus acidophilus oral capsule: 1 tab(s) orally once a day, Last Dose Taken:    aspirin 81 mg oral delayed release tablet: 1 tab(s) orally once a day, Last Dose Taken:    pantoprazole 40 mg oral delayed release tablet: 1 tab(s) orally once a day (before a meal), Last Dose Taken:    Multiple Vitamins oral tablet: 1 tab(s) orally once a day, Last Dose Taken:    glimepiride 2 mg oral tablet: 1 tab(s) orally once a day, Last Dose Taken:    cloNIDine 0.2 mg oral tablet: 1 tab(s) orally 2 times a day, Last Dose Taken:    Procardia XL 60 mg oral tablet, extended release: 1 tab(s) orally once a day      MEDICATIONS:  MEDICATIONS  (STANDING):  aspirin enteric coated 81 milliGRAM(s) Oral daily  ciprofloxacin/dexamethasone Suspension Otic 4 Drop(s) Left Ear four times a day  cloNIDine 0.1 milliGRAM(s) Oral two times a day  dextrose 5%. 1000 milliLiter(s) (50 mL/Hr) IV Continuous <Continuous>  dextrose 50% Injectable 12.5 Gram(s) IV Push once  dextrose 50% Injectable 25 Gram(s) IV Push once  dextrose 50% Injectable 25 Gram(s) IV Push once  epoetin estelle Injectable 3000 Unit(s) IV Push <User Schedule>  insulin lispro (HumaLOG) corrective regimen sliding scale   SubCutaneous at bedtime  lactobacillus acidophilus 1 Tablet(s) Oral every 12 hours  metoprolol 25 milliGRAM(s) Oral two times a day  Nephro-emil 1 Tablet(s) Oral daily  pantoprazole    Tablet 40 milliGRAM(s) Oral before breakfast  piperacillin/tazobactam IVPB. 3.375 Gram(s) IV Intermittent every 12 hours      FAMILY HISTORY:  No pertinent family history in first degree relatives      SOCIAL HISTORY:    [x ] Non-smoker  [ ] Smoker  [ ] Alcohol    Allergies    No Known Allergies    Intolerances    	    REVIEW OF SYSTEMS:  CONSTITUTIONAL: No fever, weight loss, or fatigue  EYES: No eye pain, visual disturbances, or discharge  ENMT:  No difficulty hearing, tinnitus, vertigo; No sinus or throat pain  NECK: No pain or stiffness  RESPIRATORY: No cough, wheezing, chills or hemoptysis; No Shortness of Breath  CARDIOVASCULAR: No chest pain, palpitations, passing out, dizziness, or leg swelling  GASTROINTESTINAL: No abdominal or epigastric pain. No nausea, vomiting, or hematemesis; No diarrhea or constipation. No melena or hematochezia.  GENITOURINARY: No dysuria, frequency, hematuria, or incontinence  NEUROLOGICAL: No headaches, memory loss, loss of strength, numbness, or tremors  SKIN: No itching, burning, rashes, or lesions   	    [ x] All others negative	  [ ] Unable to obtain    PHYSICAL EXAM:  T(C): 36.3 (10-18-17 @ 10:21), Max: 37 (10-17-17 @ 16:01)  HR: 55 (10-18-17 @ 10:21) (53 - 65)  BP: 133/52 (10-18-17 @ 10:21) (133/52 - 231/80)  RR: 18 (10-18-17 @ 10:21) (18 - 20)  SpO2: 99% (10-18-17 @ 10:21) (98% - 100%)  Wt(kg): --  I&O's Summary      Appearance: Normal	  Psychiatry: A & O x 3, Mood & affect appropriate  HEENT:   Normal oral mucosa, PERRL, EOMI	  Lymphatic: No lymphadenopathy  Cardiovascular: Normal S1 S2,RRR, + sys murmur   Respiratory: Lungs clear to auscultation	  Gastrointestinal:  Soft, Non-tender, + BS	  Skin: No rashes, No ecchymoses, No cyanosis	  Neurologic: Non-focal  Extremities: Normal range of motion, No clubbing, cyanosis or edema  Vascular: Peripheral pulses palpable 2+ bilaterally    TELEMETRY: 	    ECG:  	No ekg in chart    RADIOLOGY:  OTHER: 	  < from: CT Head No Cont (10.17.17 @ 18:25) >  IMPRESSION:   Progressive soft tissue swelling over the left frontotemporoparietal   region with indistinct subgaleal fluid collections. Extensive   opacification of the left mastoid air cells with questionable   indistinctness of the bony walls of several mastoid air cells. Findings   are concerning for necrotizing otitis externa with extension into the   fissures of [place], left parotid and  spaces and associated   coalescent mastoiditis. Evaluation is limited in the absence of   intravenous contrast.    Unchanged enlarged lateral and third ventricles and lower bifrontal   temporal sulci with slight sulcal effacement of the vertex this can be   seen with normal pressure hydrocephalus as well as areas of microvascular   disease. No evidence of intracranial hemorrhage or midline shift.    Multiple air-fluid levels within the sphenoid sinus, underlying sphenoid   sinusitis is not excluded.    These findings were discussed with Trenton Belle 10/17/2017 at   6:47 PM.    ---------------------------------------------------------------------------------------      < from: MRA Head w/o Cont (10.18.17 @ 09:52) >  IMPRESSION:    New focal infarct in the the left parietal white matter without    hemorrhagic transformation.    Unchanged enlarged lateral and third ventricles and lower bifrontal   temporal sulci with sulcal effacement at the vertex, this may be seen   with normal pressure hydrocephalus. Hyperintense T2 and FLAIR signal   along the ventricular margins may represent transependymal CSF.    Unchanged volume loss with nonspecific foci of lacunar infarction and   white matter hyperintense T2 and FLAIR signal likely microvascular   disease. No new hemorrhage or midline shift.    Left mastoid mastoid opacification with increased extension toward the   left petrous apex and increased soft tissue swelling of the left external   auditory canal, left frontotemporal parietal scalp toward the vertex, and   inferiorly into the left parotid,  and parapharyngeal spaces   concerning for progressive infectious and/or inflammatory change. In   patient with diabetes, a coalescent mastoiditis and a necrotizing otitis   externa and cellulitis possibly from Pseudomonas infection with other   infectious etiologies is not excluded.    Intracranially there is multifocal severe stenosis of the left cavernous   and super clinoid ICA with a dominant right ICA with narrowing likely   from atherosclerotic vascular opacification. There is a dominant right A1   and hypoplastic left A1 likely anatomic variation.    There is a 3.5 cm stenosis along the proximal subclavian artery with left   subclavian steal phenomena with a left vertebral demonstrating absent   flow in the ascending 2-D time-of-flight MRA and presence of flow in the   descending 2-D time-of-flight MRA.    Findings discussedwith Dr. Vilchis at immediate time of review on 10/18/2017   at 12:30 PM.    < end of copied text >          LABS:	 	    CARDIAC MARKERS:                                  10.8   6.73  )-----------( 97       ( 18 Oct 2017 06:00 )             33.4     10-18    138  |  97<L>  |  39<H>  ----------------------------<  151<H>  5.5<H>   |  27  |  7.58<H>    Ca    7.9<L>      18 Oct 2017 06:00  Phos  3.9     10-18  Mg     2.2     10-18    TPro  6.5  /  Alb  3.4  /  TBili  0.5  /  DBili  x   /  AST  16  /  ALT  10  /  AlkPhos  48  10-18    PT/INR - ( 17 Oct 2017 17:43 )   PT: 11.7 SEC;   INR: 1.04          PTT - ( 17 Oct 2017 17:43 )  PTT:33.5 SEC  proBNP:   Lipid Profile:   HgA1c: Hemoglobin A1C, Whole Blood: 5.8 % (10-18 @ 06:00)    TSH: Thyroid Stimulating Hormone, Serum: 2.13 uIU/mL (10-18 @ 06:00)

## 2017-10-18 NOTE — CONSULT NOTE ADULT - SUBJECTIVE AND OBJECTIVE BOX
HPI:  77 yo male , with PMHx of CAD, Stent on ASA, HTN, ESRD on HD, DM, PVD , high cholesterol, mild dementia, presented with 3 days of lethargy and weakness (L>R) followed sudden onset of encephalopathy which described as unresponsiveness.     during dialysis on hte day of admission, he had worsening left sided weakness. Exam does not show any substantial left sided weakness.     Upon presentation, he was noted to have facial swelling and imaging raised a concern for mastoiditis with indistinctness of bony walls with possible necrotizing otitis externa , likely severe otitis externa with surrounding cellulitis and underlying  soft tissue spread of infection,    Non-toxic at this time.      Labs: Na 138, K= 5, BUN 35, Creatinine 6.63, glucose 137, WBC 6.74, Hgb 11.4, Platelet 103, PT 11.7, INR 1.04, PTT 33.5    Vitals: Tem 98, HR 53, /66, RR 18, 98 % RA (18 Oct 2017 01:57)      PAST MEDICAL & SURGICAL HISTORY:  Dementia  Thrombocytopenia  PVD (peripheral vascular disease)  CAD (coronary artery disease): s/p 1 stent  HLD (hyperlipidemia)  HTN (hypertension)  DM (diabetes mellitus)  ESRD (end stage renal disease)  H/O foot surgery: sec to ankle fracture  S/P cataract extraction  S/P coronary artery stent placement  S/P cholecystectomy  AVF (arteriovenous fistula)      Allergies    No Known Allergies    Intolerances        ANTIMICROBIALS:  piperacillin/tazobactam IVPB. 3.375 every 12 hours      OTHER MEDS:  aspirin enteric coated 81 milliGRAM(s) Oral daily  ciprofloxacin/dexamethasone Suspension Otic 4 Drop(s) Left Ear four times a day  cloNIDine 0.1 milliGRAM(s) Oral two times a day  dextrose 5%. 1000 milliLiter(s) IV Continuous <Continuous>  dextrose 50% Injectable 12.5 Gram(s) IV Push once  dextrose 50% Injectable 25 Gram(s) IV Push once  dextrose 50% Injectable 25 Gram(s) IV Push once  dextrose Gel 1 Dose(s) Oral once PRN  epoetin estelle Injectable 3000 Unit(s) IV Push <User Schedule>  glucagon  Injectable 1 milliGRAM(s) IntraMuscular once PRN  insulin lispro (HumaLOG) corrective regimen sliding scale   SubCutaneous at bedtime  lactobacillus acidophilus 1 Tablet(s) Oral every 12 hours  metoprolol 25 milliGRAM(s) Oral two times a day  Nephro-emil 1 Tablet(s) Oral daily  pantoprazole    Tablet 40 milliGRAM(s) Oral before breakfast      SOCIAL HISTORY: , no ilicit drug use    FAMILY HISTORY:  No pertinent family history in first degree relatives      REVIEW OF SYSTEMS  [  ] ROS unobtainable because:    [  ] All other systems negative except as noted below:	    Constitutional:  [ ] fever [ ] weight loss  Skin:  x ] facial swelling	  Eyes: [ ] icterus [ ] inflammation	  ENMT: [ ] discharge [ ] thrush [ ] ulcers [ ] exudates  Respiratory: [ ] dyspnea [ ] hemoptysis [ ] cough [ ] sputum	  Cardiovascular:  [ ] chest pain [ ] palpitations [ ] edema	  Gastrointestinal:  [ ] nausea [ ] vomiting [ ] diarrhea [ ] constipation [ ] pain	  Genitourinary:  [ ] dysuria [ ] frequency [ ] hematuria [ ] discharge [ ] flank pain  Musculoskeletal:  [ ] myalgias [ ] arthralgias [ ] arthritis	  Neurological:  [ ] headache [ ] seizures	  Psychiatric:  [ ] anxiety [ ] depression	  Hematology/Lymphatics:  [ ] lymphadenopathy  Endocrine:  [ ] adrenal [ ] thyroid  Allergic/Immunologic:	 [ ] transplant [ ] seasonal    PHYSICAL EXAM:  General: [ x] non-toxic  HEAD/EYES: [ ] smalla rupal redness to left temple area, overall facial swelling  ENT:  [ ] normal [ ] supple [ ] thrush [ ] pharyngeal exudate  Cardiovascular:   [ ] murmur [x ] normal [ ] PPM/AICD  Respiratory:  [ ] clear to ausculation bilaterally  GI:  [ x] soft, non-tender, normal bowel sounds  :  [ ] cordoba [ ] no CVA tenderness   Musculoskeletal:  [ x] no synovitis  Neurologic:  [ ] non-focal exam   Skin:  [ ] no rash  Lymph: [x ] no lymphadenopathy  Psychiatric:  [ x] appropriate affect [ ] alert & oriented  Lines:  [x ] no phlebitis [ ] central line          Drug Dosing Weight  Height (cm): 162.56 (18 Oct 2017 01:02)  Weight (kg): 58.1 (18 Oct 2017 01:02)  BMI (kg/m2): 22 (18 Oct 2017 01:02)  BSA (m2): 1.62 (18 Oct 2017 01:02)    Vital Signs Last 24 Hrs  T(F): 97.7 (10-18-17 @ 14:00), Max: 98.6 (10-17-17 @ 16:01)    Vital Signs Last 24 Hrs  HR: 53 (10-18-17 @ 14:00) (53 - 65)  BP: 125/60 (10-18-17 @ 14:00) (125/60 - 230/78)  RR: 18 (10-18-17 @ 14:00)  SpO2: 100% (10-18-17 @ 14:00) (98% - 100%)  Wt(kg): --                          10.8   6.73  )-----------( 97       ( 18 Oct 2017 06:00 )             33.4       10-18    138  |  97<L>  |  39<H>  ----------------------------<  151<H>  5.5<H>   |  27  |  7.58<H>    Ca    7.9<L>      18 Oct 2017 06:00  Phos  3.9     10-18  Mg     2.2     10-18    TPro  6.5  /  Alb  3.4  /  TBili  0.5  /  DBili  x   /  AST  16  /  ALT  10  /  AlkPhos  48  10-18          MICROBIOLOGY:    RADIOLOGY:  < from: CT Head No Cont (10.17.17 @ 18:25) >  IMPRESSION:   Progressive soft tissue swelling over the left frontotemporoparietal   region with indistinct subgaleal fluid collections. Extensive   opacification of the left mastoid air cells with questionable   indistinctness of the bony walls of several mastoid air cells. Findings   are concerning for necrotizing otitis externa with extension into the   fissures of [place], left parotid and  spaces and associated   coalescent mastoiditis. Evaluation is limited in the absence of   intravenous contrast.    Unchanged enlarged lateral and third ventricles and lower bifrontal   temporal sulci with slight sulcal effacement of the vertex this can be   seen with normal pressure hydrocephalus as well as areas of microvascular   disease. No evidence of intracranial hemorrhage or midline shift.    Multiple air-fluid levels within the sphenoid sinus, underlying sphenoid   sinusitis is not excluded.    These findings were discussed with Trenton Belle 10/17/2017 at   6:47 PM.    < end of copied text >    < from: MRI Orbit, Face, and/or Neck w/o Cont, Bilat (10.18.17 @ 09:52) >  MPRESSION:    New focal infarct in the the left parietal white matter without    hemorrhagic transformation.    Unchanged enlarged lateral and third ventricles and lower bifrontal   temporal sulci with sulcal effacement at the vertex, this may be seen   with normal pressure hydrocephalus. Hyperintense T2 and FLAIR signal   along the ventricular margins may represent transependymal CSF.    Unchanged volume loss with nonspecific foci of lacunar infarction and   white matter hyperintense T2 and FLAIR signal likely microvascular   disease. No new hemorrhage or midline shift.    Left mastoid mastoid opacification with increased extension toward the   left petrous apex and increased soft tissue swelling of the left external   auditory canal, left frontotemporal parietal scalp toward the vertex, and   inferiorly into the left parotid,  and parapharyngeal spaces   concerning for progressive infectious and/or inflammatory change. In   patient with diabetes, a coalescent mastoiditis and a necrotizing otitis   externa and cellulitis possibly from Pseudomonas infection with other   infectious etiologies is not excluded.    Intracranially there is multifocal severe stenosis of the left cavernous   and super clinoid ICA with a dominant right ICA with narrowing likely   from atherosclerotic vascular opacification. There is a dominant right A1   and hypoplastic left A1 likely anatomic variation.    There is a 3.5 cm stenosis along the proximal subclavian artery with left   subclavian steal phenomena with a left vertebral demonstrating absent   flow in the ascending 2-D time-of-flight MRA and presence of flow in the   descending 2-D time-of-flight MRA.    < end of copied text >

## 2017-10-18 NOTE — CONSULT NOTE ADULT - ASSESSMENT
76 year old male with CAD/PCI, HTN, PVD, ESRD on HD admitted with left sided weakness r/o CVA / Facial cellulitis    1. cv stable   no chest pain or sob   check EKG  bradycardia noted  metoprolol decreased today from 50mg to 25 mg BID PO  trend HR   no evidence of decomp CHF on exam    recent echo revealing normal LV sys fx, mild MR/ AR , EF 65-70 %    2. CAD/PCI  stable   continue with ASA/ BB    3. Facial cellulitis  MRA/MRI/ CT head results noted   ENT f.u  + Mastoiditis, Left otitis externa  IV abx   ID f./u     4. HTN   bp acceptable   cont BB, procardia, clonidine     5. ESRD on hd  renal f.u     6. Neuro F/u   MRA/MRI head revealing new focal infarct   continue with ASA     dvt ppx 76 year old male with CAD/PCI, HTN, PVD, ESRD on HD admitted with left sided weakness r/o CVA / Facial cellulitis    1. cv stable   no chest pain or sob   check EKG  bradycardia noted  metoprolol decreased today from 50mg to 25 mg BID PO  trend HR   no evidence of decomp CHF on exam    recent echo revealing normal LV sys fx, mild MR/ AR , EF 65-70 %    2. CAD/PCI  stable   continue with ASA/ BB    3. Facial cellulitis  MRA/MRI/ CT head results noted   ENT f.u  + Mastoiditis, Left otitis externa  IV abx   ID f./u     4. HTN   bp acceptable   cont BB, procardia, clonidine     5. ESRD on hd  renal f.u     6. Neuro F/u   MRA/MRI head revealing new focal infarct   continue with ASA   add statin   dvt ppx

## 2017-10-18 NOTE — PROGRESS NOTE ADULT - SUBJECTIVE AND OBJECTIVE BOX
[facility] NEPHROLOGY- PROGRESS NOTE    76 year old male h/o ESRD on HD presents with L facial swelling. Nephrology consulted for ESRD status.    REVIEW OF SYSTEMS:  Gen: no changes in weight, + left facial swelling  Cards: no chest pain  Resp: no dyspnea  GI: no nausea or vomiting or diarrhea  Vascular: no LE edema    No Known Allergies      Hospital Medications: Medications reviewed    VITALS:  T(F): 97.4 (10-18-17 @ 10:21), Max: 98.6 (10-17-17 @ 16:01)  HR: 55 (10-18-17 @ 10:21)  BP: 133/52 (10-18-17 @ 10:21)  RR: 18 (10-18-17 @ 10:21)  SpO2: 99% (10-18-17 @ 10:21)  Wt(kg): --  Height (cm): 162.56 (10-18 @ 01:02)  Weight (kg): 58.1 (10-18 @ 01:02)  BMI (kg/m2): 22 (10-18 @ 01:02)  BSA (m2): 1.62 (10-18 @ 01:02)      PHYSICAL EXAM:    Gen: NAD, calm, + left facial swelling  Cards: RRR, +S1/S2, no M/G/R  Resp: CTA B/L  GI: soft, NT/ND, NABS  Vascular: no LE edema B/L, LUE AVF + bruit/thrill      LABS:  10-18    138  |  97<L>  |  39<H>  ----------------------------<  151<H>  5.5<H>   |  27  |  7.58<H>    Ca    7.9<L>      18 Oct 2017 06:00  Phos  3.9     10-18  Mg     2.2     10-18    TPro  6.5  /  Alb  3.4  /  TBili  0.5  /  DBili      /  AST  16  /  ALT  10  /  AlkPhos  48  10-18    Creatinine Trend: 7.58 <--, 6.63 <--                        10.8   6.73  )-----------( 97       ( 18 Oct 2017 06:00 )             33.4

## 2017-10-18 NOTE — H&P ADULT - PMH
CAD (coronary artery disease)  s/p 1 stent  DM (diabetes mellitus)    ESRD (end stage renal disease)    HLD (hyperlipidemia)    HTN (hypertension)    PVD (peripheral vascular disease) CAD (coronary artery disease)  s/p 1 stent  Dementia    DM (diabetes mellitus)    ESRD (end stage renal disease)    HLD (hyperlipidemia)    HTN (hypertension)    PVD (peripheral vascular disease)    Thrombocytopenia

## 2017-10-18 NOTE — H&P ADULT - HISTORY OF PRESENT ILLNESS
77 yo m, with PMHx of CAD on dual antiplatelet therapy, HTN, ESRD on HD, DM, PVD , high cholesterol, presented with 3 days of lethargy and weakness (L>R) followed sudden onset of encephalopathy which described as unresponsiveness, during dialysis today as well as worsening left sided weakness. Exam does not show any substantial left sided weakness. CT H is positive for significant white matter disease with progressive tissue swelling over the left frontotemporal region as well as mastoiditis with indistinctness of bony walls with possible necrotizing otitis externa , likely severe otitis externa with surrounding cellulitis and underlying  soft tissue spread of infection 77 yo male , with PMHx of CAD, Stent on ASA, HTN, ESRD on HD, DM, PVD , high cholesterol, mild dementia, presented with 3 days of lethargy and weakness (L>R) followed sudden onset of encephalopathy which described as unresponsiveness, during dialysis today as well as worsening left sided weakness. Exam does not show any substantial left sided weakness. CT H is positive for significant white matter disease with progressive tissue swelling over the left frontotemporal region as well as mastoiditis with indistinctness of bony walls with possible necrotizing otitis externa , likely severe otitis externa with surrounding cellulitis and underlying  soft tissue spread of infection, pt is accompanied with his wife, HX mostly obtained from wife, pt is A+O x2, pt c/o left shoulder pain, no CP, no SOB, no cough, no N/V, no dysuria, no HA, no dizziness, no abdominal pain, still making urine, + Left side of face swollen with erythema, pt was seen by ENT, Neuro, renal tonight, s/p IV Zosyn, IV Clinda tonight, no fever was reported, pt is mildly lethargic, but no distress, + Thrombocytopenia as per lab, elevated BP in the ER, S/P PO Clonidine 0.2 mg x 1 given in the ER,     Labs: Na 138, K= 5, BUN 35, Creatinine 6.63, glucose 137, WBC 6.74, Hgb 11.4, Platelet 103, PT 11.7, INR 1.04, PTT 33.5    Vitals: Tem 98, HR 53, /66, RR 18, 98 % RA

## 2017-10-18 NOTE — CONSULT NOTE ADULT - CONSULT REASON
CAD, Stent on ASA, HTN, ESRD on HD, DM, PVD CAD/ PCI ,  HTN, ESRD on HD, DM, PVD bradycardia  hx CAD/ PCI ,  HTN, ESRD on HD, DM, PVD

## 2017-10-18 NOTE — H&P ADULT - MUSCULOSKELETAL
details… detailed exam ROM intact/no joint swelling/no joint warmth/no calf tenderness/no joint erythema

## 2017-10-18 NOTE — PROGRESS NOTE ADULT - PROBLEM SELECTOR PLAN 1
Last HD on 10/17 as outpatient terminated after 30 minutes. Plan for maintenance HD today and will keep on MWF schedule for now. Monitor electrolytes.

## 2017-10-18 NOTE — H&P ADULT - ASSESSMENT
77 yo male , with PMHx of CAD, Stent on ASA, HTN, ESRD on HD, DM, PVD , high cholesterol, mild dementia, presented with 3 days of lethargy and weakness (L>R) followed sudden onset of encephalopathy which described as unresponsiveness, during dialysis today as well as worsening left sided weakness. Exam does not show any substantial left sided weakness. CT H is positive for significant white matter disease with progressive tissue swelling over the left frontotemporal region as well as mastoiditis with indistinctness of bony walls with possible necrotizing otitis externa , likely severe otitis externa with surrounding cellulitis and underlying  soft tissue spread of infection, pt is accompanied with his wife, HX mostly obtained from wife, pt is A+O x2, pt c/o left shoulder pain, no CP, no SOB, no cough, no N/V, no dysuria, no HA, no dizziness, no abdominal pain, still making urine, + Left side of face swollen with erythema, pt was seen by ENT, Neuro, renal tonight, s/p IV Zosyn, IV Clinda tonight, no fever was reported, pt is mildly lethargic, but no distress, + Thrombocytopenia as per lab, elevated BP in the ER, S/P PO Clonidine 0.2 mg x 1 given in the ER,

## 2017-10-18 NOTE — PROGRESS NOTE ADULT - ATTENDING COMMENTS
[facility] NEPHROLOGY  Ihsan Saba M.D.  Eduardo Romano D.O.  Loni Ribeiro M.D.  Maris Ramirez, MSN, ANP-C    Telephone: [phone]  Facsimile: [phone]    [id number] [place] [address]

## 2017-10-18 NOTE — H&P ADULT - PROBLEM SELECTOR PLAN 1
+ Mastoiditis, Left otitis EXTERNA, ENT F/U, ID consult in AM, IV Zosyn, Bacid, MRI Neck/Face no contrast, Blood cultures, CBC, CMP f/u   fall/aspiration precaution  Chest  X ray

## 2017-10-18 NOTE — CONSULT NOTE ADULT - ASSESSMENT
76 year old with ESRD presents with change in mental status.  I think that this is most likely due to the acute CVA noted on MRI.    He does have otitis externa with an associated facial cellulitis.     His clinical presentation is not typical for mastoiditis.    I agree with mrsa and pseudomonal coverage- would change to vanco and ceftaz.    Will monitor for response.    He does have one erythematous macule- will monitor to ensure that this does not become vesicular.

## 2017-10-18 NOTE — PROGRESS NOTE ADULT - SUBJECTIVE AND OBJECTIVE BOX
pt was evaluated by hospitalist earlier during the day , labs vitals, consults revised, discussed management plan in detail in length with pts family

## 2017-10-18 NOTE — H&P ADULT - NSHPLABSRESULTS_GEN_ALL_CORE
Chest X ray and X Ray Left shoulder were ordered,     ECG: Sinus Sincere @ 55, First Degree AVB, inverted T in I, II, AVL, V4-V6, NO change from old ECG was noted by me,

## 2017-10-18 NOTE — CONSULT NOTE ADULT - ATTENDING COMMENTS
Pt with weakness, diffuse, evidence of malignant otitis extterna. recommend MRI brain to exclude intracranial involvement
Patient seen and examined, agree with the above assessment and plan by LUANA Perez.  CV status appears stable  No CHF  Cont current medical mgmt of CAD  BB dose decreased for bradycardia

## 2017-10-18 NOTE — H&P ADULT - PROBLEM SELECTOR PLAN 8
Neuro f/u, R/O CVA / TIA , very unlikely,   MRI Head and MRA Neck and Head no contrast  PT consult, on ASA   Vit B12, Folate, iron studies, TSH , Ferritin,   Speech and swallow consult

## 2017-10-18 NOTE — PROGRESS NOTE ADULT - PROBLEM SELECTOR PLAN 2
BP uncontrolled yesterday as patient not given medications until this morning. BP now improved. Will increase procardia to 90 mg daily in an effort to taper off clonidine (decrease to 0.1 mg twice daily) as patient with labile pressures as outpatient. Will decrease metoprolol to 25 mg twice daily given bradycardia. Monitor BP.

## 2017-10-19 LAB
APPEARANCE UR: CLEAR — SIGNIFICANT CHANGE UP
BILIRUB UR-MCNC: NEGATIVE — SIGNIFICANT CHANGE UP
BLOOD UR QL VISUAL: NEGATIVE — SIGNIFICANT CHANGE UP
BUN SERPL-MCNC: 19 MG/DL — SIGNIFICANT CHANGE UP (ref 7–23)
CALCIUM SERPL-MCNC: 7.6 MG/DL — LOW (ref 8.4–10.5)
CHLORIDE SERPL-SCNC: 96 MMOL/L — LOW (ref 98–107)
CHOLEST SERPL-MCNC: 134 MG/DL — SIGNIFICANT CHANGE UP (ref 120–199)
CO2 SERPL-SCNC: 30 MMOL/L — SIGNIFICANT CHANGE UP (ref 22–31)
COLOR SPEC: YELLOW — SIGNIFICANT CHANGE UP
CREAT SERPL-MCNC: 4.91 MG/DL — HIGH (ref 0.5–1.3)
GLUCOSE BLDC GLUCOMTR-MCNC: 140 MG/DL — HIGH (ref 70–99)
GLUCOSE BLDC GLUCOMTR-MCNC: 172 MG/DL — HIGH (ref 70–99)
GLUCOSE BLDC GLUCOMTR-MCNC: 219 MG/DL — HIGH (ref 70–99)
GLUCOSE BLDC GLUCOMTR-MCNC: 268 MG/DL — HIGH (ref 70–99)
GLUCOSE BLDC GLUCOMTR-MCNC: 271 MG/DL — HIGH (ref 70–99)
GLUCOSE SERPL-MCNC: 142 MG/DL — HIGH (ref 70–99)
GLUCOSE UR-MCNC: 250 — SIGNIFICANT CHANGE UP
HBA1C BLD-MCNC: 5.7 % — HIGH (ref 4–5.6)
HCT VFR BLD CALC: 29.4 % — LOW (ref 39–50)
HDLC SERPL-MCNC: 27 MG/DL — LOW (ref 35–55)
HGB BLD-MCNC: 10.1 G/DL — LOW (ref 13–17)
KETONES UR-MCNC: NEGATIVE — SIGNIFICANT CHANGE UP
LEUKOCYTE ESTERASE UR-ACNC: NEGATIVE — SIGNIFICANT CHANGE UP
LIPID PNL WITH DIRECT LDL SERPL: 93 MG/DL — SIGNIFICANT CHANGE UP
MAGNESIUM SERPL-MCNC: 2 MG/DL — SIGNIFICANT CHANGE UP (ref 1.6–2.6)
MCHC RBC-ENTMCNC: 32.1 PG — SIGNIFICANT CHANGE UP (ref 27–34)
MCHC RBC-ENTMCNC: 34.4 % — SIGNIFICANT CHANGE UP (ref 32–36)
MCV RBC AUTO: 93.3 FL — SIGNIFICANT CHANGE UP (ref 80–100)
NITRITE UR-MCNC: NEGATIVE — SIGNIFICANT CHANGE UP
NRBC # FLD: 0 — SIGNIFICANT CHANGE UP
PH UR: 8.5 — HIGH (ref 4.6–8)
PHOSPHATE SERPL-MCNC: 3.1 MG/DL — SIGNIFICANT CHANGE UP (ref 2.5–4.5)
PLATELET # BLD AUTO: 87 K/UL — LOW (ref 150–400)
PMV BLD: 10.8 FL — SIGNIFICANT CHANGE UP (ref 7–13)
POTASSIUM SERPL-MCNC: 4.6 MMOL/L — SIGNIFICANT CHANGE UP (ref 3.5–5.3)
POTASSIUM SERPL-SCNC: 4.6 MMOL/L — SIGNIFICANT CHANGE UP (ref 3.5–5.3)
PROT UR-MCNC: 300 — SIGNIFICANT CHANGE UP
RBC # BLD: 3.15 M/UL — LOW (ref 4.2–5.8)
RBC # FLD: 13.9 % — SIGNIFICANT CHANGE UP (ref 10.3–14.5)
RBC CASTS # UR COMP ASSIST: SIGNIFICANT CHANGE UP (ref 0–?)
SODIUM SERPL-SCNC: 137 MMOL/L — SIGNIFICANT CHANGE UP (ref 135–145)
SP GR SPEC: 1.01 — SIGNIFICANT CHANGE UP (ref 1–1.03)
SPECIMEN SOURCE: SIGNIFICANT CHANGE UP
SPECIMEN SOURCE: SIGNIFICANT CHANGE UP
SQUAMOUS # UR AUTO: SIGNIFICANT CHANGE UP
TRIGL SERPL-MCNC: 122 MG/DL — SIGNIFICANT CHANGE UP (ref 10–149)
UROBILINOGEN FLD QL: NORMAL E.U. — SIGNIFICANT CHANGE UP (ref 0.1–0.2)
WBC # BLD: 6.08 K/UL — SIGNIFICANT CHANGE UP (ref 3.8–10.5)
WBC # FLD AUTO: 6.08 K/UL — SIGNIFICANT CHANGE UP (ref 3.8–10.5)
WBC UR QL: SIGNIFICANT CHANGE UP (ref 0–?)

## 2017-10-19 PROCEDURE — 99232 SBSQ HOSP IP/OBS MODERATE 35: CPT

## 2017-10-19 RX ORDER — INSULIN LISPRO 100/ML
VIAL (ML) SUBCUTANEOUS
Qty: 0 | Refills: 0 | Status: DISCONTINUED | OUTPATIENT
Start: 2017-10-19 | End: 2017-10-27

## 2017-10-19 RX ORDER — METOPROLOL TARTRATE 50 MG
12.5 TABLET ORAL
Qty: 0 | Refills: 0 | Status: DISCONTINUED | OUTPATIENT
Start: 2017-10-19 | End: 2017-10-27

## 2017-10-19 RX ORDER — VANCOMYCIN HCL 1 G
1000 VIAL (EA) INTRAVENOUS EVERY 12 HOURS
Qty: 0 | Refills: 0 | Status: DISCONTINUED | OUTPATIENT
Start: 2017-10-19 | End: 2017-10-19

## 2017-10-19 RX ORDER — VANCOMYCIN HCL 1 G
500 VIAL (EA) INTRAVENOUS
Qty: 0 | Refills: 0 | Status: DISCONTINUED | OUTPATIENT
Start: 2017-10-19 | End: 2017-10-20

## 2017-10-19 RX ADMIN — Medication 0.1 MILLIGRAM(S): at 06:36

## 2017-10-19 RX ADMIN — Medication 1: at 23:29

## 2017-10-19 RX ADMIN — CIPROFLOXACIN AND DEXAMETHASONE 4 DROP(S): 3; 1 SUSPENSION/ DROPS AURICULAR (OTIC) at 12:22

## 2017-10-19 RX ADMIN — Medication 0.1 MILLIGRAM(S): at 17:09

## 2017-10-19 RX ADMIN — Medication 1 TABLET(S): at 12:22

## 2017-10-19 RX ADMIN — Medication 81 MILLIGRAM(S): at 12:22

## 2017-10-19 RX ADMIN — Medication 25 MILLIGRAM(S): at 07:05

## 2017-10-19 RX ADMIN — PANTOPRAZOLE SODIUM 40 MILLIGRAM(S): 20 TABLET, DELAYED RELEASE ORAL at 06:35

## 2017-10-19 RX ADMIN — Medication 90 MILLIGRAM(S): at 07:03

## 2017-10-19 RX ADMIN — PIPERACILLIN AND TAZOBACTAM 25 GRAM(S): 4; .5 INJECTION, POWDER, LYOPHILIZED, FOR SOLUTION INTRAVENOUS at 06:36

## 2017-10-19 RX ADMIN — Medication 1 TABLET(S): at 17:09

## 2017-10-19 RX ADMIN — ATORVASTATIN CALCIUM 80 MILLIGRAM(S): 80 TABLET, FILM COATED ORAL at 22:54

## 2017-10-19 RX ADMIN — Medication 1 TABLET(S): at 06:35

## 2017-10-19 RX ADMIN — ATORVASTATIN CALCIUM 80 MILLIGRAM(S): 80 TABLET, FILM COATED ORAL at 00:52

## 2017-10-19 RX ADMIN — CIPROFLOXACIN AND DEXAMETHASONE 4 DROP(S): 3; 1 SUSPENSION/ DROPS AURICULAR (OTIC) at 06:36

## 2017-10-19 RX ADMIN — Medication 2: at 17:06

## 2017-10-19 RX ADMIN — CIPROFLOXACIN AND DEXAMETHASONE 4 DROP(S): 3; 1 SUSPENSION/ DROPS AURICULAR (OTIC) at 17:09

## 2017-10-19 NOTE — PROGRESS NOTE ADULT - PROBLEM SELECTOR PLAN 1
Last HD on 10/18 with 1.7L removal. Plan for maintenance HD 10/20/17 and will keep on MWF schedule for now (as an outpatient he is on TTS). Monitor electrolytes- pending at present . Mild Hyperkalemia f/u potassium level this am Last HD on 10/18 with 1.7L removal. Plan for maintenance HD 10/20/17 and will keep on MWF schedule for now (as an outpatient he is on TTS). Monitor electrolytes.

## 2017-10-19 NOTE — SWALLOW BEDSIDE ASSESSMENT ADULT - SLP PERTINENT HISTORY OF CURRENT PROBLEM
75 yo male , with PMHx of CAD, Stent on ASA, HTN, ESRD on HD, DM, PVD , high cholesterol, mild dementia, presented with 3 days of lethargy and weakness (L>R) followed sudden onset of encephalopathy which described as unresponsiveness, during dialysis today as well as worsening left sided weakness. Exam does not show any substantial left sided weakness. CT H is positive for significant white matter disease with progressive tissue swelling over the left frontotemporal region as well as mastoiditis with indistinctness of bony walls with possible necrotizing otitis externa , likely severe otitis externa with surrounding cellulitis and underlying  soft tissue spread of infection,

## 2017-10-19 NOTE — PROGRESS NOTE ADULT - ASSESSMENT
76M with multiple medical problems.  Otitis externa responding well to therapy.  Surrounding skin involvement nearly resolved.   - no acute ENT needs  - continue ear drops to complete 10 days  - other abx per ID  - will follow as needed  - Follow up in ENT clinic 1-2 weeks after discharge.  [phone]

## 2017-10-19 NOTE — PROGRESS NOTE ADULT - PROBLEM SELECTOR PLAN 3
Hb acceptable.  Epo restarted at lower dose (3K with HD). Monitor Hb. Hb acceptable.  c/w LEXIE, restarted at lower dose (3K with HD). If hgb continues to decrease, will increase Epogen. Monitor Hb.

## 2017-10-19 NOTE — PROGRESS NOTE ADULT - SUBJECTIVE AND OBJECTIVE BOX
[facility] NEPHROLOGY- PROGRESS NOTE, Follow up     Patient is a 76y Male with h/o ESRD on HD presents with L facial swelling. Nephrology consulted for ESRD status.  Allergy:  No Known Allergies    Hospital Medications:   MEDICATIONS  (STANDING):  aspirin enteric coated 81 milliGRAM(s) Oral daily  atorvastatin 80 milliGRAM(s) Oral at bedtime  cefTAZidime  IVPB 2 Gram(s) IV Intermittent <User Schedule>  ciprofloxacin/dexamethasone Suspension Otic 4 Drop(s) Left Ear four times a day  cloNIDine 0.1 milliGRAM(s) Oral two times a day  dextrose 5%. 1000 milliLiter(s) (50 mL/Hr) IV Continuous <Continuous>  dextrose 50% Injectable 12.5 Gram(s) IV Push once  dextrose 50% Injectable 25 Gram(s) IV Push once  dextrose 50% Injectable 25 Gram(s) IV Push once  epoetin estelle Injectable 3000 Unit(s) IV Push <User Schedule>  insulin lispro (HumaLOG) corrective regimen sliding scale   SubCutaneous at bedtime  lactobacillus acidophilus 1 Tablet(s) Oral every 12 hours  metoprolol 25 milliGRAM(s) Oral two times a day  Nephro-emil 1 Tablet(s) Oral daily  NIFEdipine XL 90 milliGRAM(s) Oral daily  pantoprazole    Tablet 40 milliGRAM(s) Oral before breakfast  piperacillin/tazobactam IVPB. 3.375 Gram(s) IV Intermittent every 12 hours    REVIEW OF SYSTEMS:  Wife at bedside  Gen: no changes in weight,reports + left facial swelling improving  Cards: no chest pain  Resp: no dyspnea  GI: no nausea or vomiting or diarrhea  Vascular: no LE edema    VITALS:  T(F): 97.8 (10-19-17 @ 01:46), Max: 97.8 (10-19-17 @ 01:46)  HR: 61 (10-19-17 @ 01:46)  BP: 150/53 (10-19-17 @ 01:46)  RR: 18 (10-19-17 @ 01:46)  SpO2: 100% (10-19-17 @ 01:46)  Wt(kg): --    10-18 @ 07:01  -  10-19 @ 06:08  --------------------------------------------------------  IN: 600 mL / OUT: 1900 mL / NET: -1300 mL        PHYSICAL EXAM:  Gen: NAD, calm, + left facial swelling mild   Cards: RRR, +S1/S2, no M/G/R  Resp: CTA B/L  GI: soft, NT/ND, NABS  Vascular: no LE edema B/L, LUE AVF + bruit/thrill    LABS:  10-18    138  |  97<L>  |  39<H>  ----------------------------<  151<H>  5.5<H>   |  27  |  7.58<H>    Ca    7.9<L>      18 Oct 2017 06:00  Mg     2.2     10-18    TPro  6.5  /  Alb  3.4  /  TBili  0.5  /  DBili  x   /  AST  16  /  ALT  10  /  AlkPhos  48  10-18    Creatinine Trend: 7.58 <--, 6.63 <--                        10.8   6.73  )-----------( 97       ( 18 Oct 2017 06:00 )             33.4     Urine Studies:      RADIOLOGY & ADDITIONAL STUDIES: [facility] NEPHROLOGY- PROGRESS NOTE, Follow up     Patient is a 76y Male with h/o ESRD on HD presents with L facial swelling. Nephrology consulted for ESRD status.  Allergy:  No Known Allergies    Hospital Medications:   MEDICATIONS  (STANDING):  aspirin enteric coated 81 milliGRAM(s) Oral daily  atorvastatin 80 milliGRAM(s) Oral at bedtime  cefTAZidime  IVPB 2 Gram(s) IV Intermittent <User Schedule>  ciprofloxacin/dexamethasone Suspension Otic 4 Drop(s) Left Ear four times a day  cloNIDine 0.1 milliGRAM(s) Oral two times a day  dextrose 5%. 1000 milliLiter(s) (50 mL/Hr) IV Continuous <Continuous>  dextrose 50% Injectable 12.5 Gram(s) IV Push once  dextrose 50% Injectable 25 Gram(s) IV Push once  dextrose 50% Injectable 25 Gram(s) IV Push once  epoetin estelle Injectable 3000 Unit(s) IV Push <User Schedule>  insulin lispro (HumaLOG) corrective regimen sliding scale   SubCutaneous at bedtime  lactobacillus acidophilus 1 Tablet(s) Oral every 12 hours  metoprolol 25 milliGRAM(s) Oral two times a day  Nephro-emil 1 Tablet(s) Oral daily  NIFEdipine XL 90 milliGRAM(s) Oral daily  pantoprazole    Tablet 40 milliGRAM(s) Oral before breakfast  piperacillin/tazobactam IVPB. 3.375 Gram(s) IV Intermittent every 12 hours    REVIEW OF SYSTEMS:  Wife at bedside  Gen: no changes in weight,reports + left facial swelling improving  Cards: no chest pain  Resp: no dyspnea  GI: no nausea or vomiting or diarrhea  Vascular: no LE edema    VITALS:  T(F): 97.8 (10-19-17 @ 01:46), Max: 97.8 (10-19-17 @ 01:46)  HR: 61 (10-19-17 @ 01:46)  BP: 150/53 (10-19-17 @ 01:46)  RR: 18 (10-19-17 @ 01:46)  SpO2: 100% (10-19-17 @ 01:46)  Wt(kg): --    10-18 @ 07:01  -  10-19 @ 06:08  --------------------------------------------------------  IN: 600 mL / OUT: 1900 mL / NET: -1300 mL        PHYSICAL EXAM:  Gen: NAD, calm, + left facial swelling mild   Cards: RRR, +S1/S2, no M/G/R  Resp: CTA B/L  GI: soft, NT/ND, NABS  Vascular: no LE edema B/L, LUE AVF + bruit  Dull slow thrill    LABS:  10-18    138  |  97<L>  |  39<H>  ----------------------------<  151<H>  5.5<H>   |  27  |  7.58<H>    Ca    7.9<L>      18 Oct 2017 06:00  Mg     2.2     10-18    TPro  6.5  /  Alb  3.4  /  TBili  0.5  /  DBili  x   /  AST  16  /  ALT  10  /  AlkPhos  48  10-18    Creatinine Trend: 7.58 <--, 6.63 <--                        10.8   6.73  )-----------( 97       ( 18 Oct 2017 06:00 )             33.4     Urine Studies:      RADIOLOGY & ADDITIONAL STUDIES:

## 2017-10-19 NOTE — PROGRESS NOTE ADULT - SUBJECTIVE AND OBJECTIVE BOX
CARDIOLOGY FOLLOW UP - Dr. Flaherty    CC no chest pain or sob        PHYSICAL EXAM:  T(C): 37 (10-19-17 @ 14:15), Max: 37 (10-19-17 @ 06:39)  HR: 60 (10-19-17 @ 14:15) (54 - 61)  BP: 118/55 (10-19-17 @ 14:15) (118/55 - 150/53)  RR: 18 (10-19-17 @ 14:15) (16 - 18)  SpO2: 99% (10-19-17 @ 14:15) (99% - 100%)  Wt(kg): --  I&O's Summary    18 Oct 2017 07:01  -  19 Oct 2017 07:00  --------------------------------------------------------  IN: 600 mL / OUT: 1900 mL / NET: -1300 mL        Appearance: Normal	  Cardiovascular: Normal S1 S2,RRR, + sys murmur   Respiratory: Lungs clear to auscultation	  Gastrointestinal:  Soft, Non-tender, + BS	  Extremities: Normal range of motion, facial edema        MEDICATIONS  (STANDING):  aspirin enteric coated 81 milliGRAM(s) Oral daily  atorvastatin 80 milliGRAM(s) Oral at bedtime  cefTAZidime  IVPB 2 Gram(s) IV Intermittent <User Schedule>  ciprofloxacin/dexamethasone Suspension Otic 4 Drop(s) Left Ear four times a day  cloNIDine 0.1 milliGRAM(s) Oral two times a day  dextrose 5%. 1000 milliLiter(s) (50 mL/Hr) IV Continuous <Continuous>  dextrose 50% Injectable 12.5 Gram(s) IV Push once  dextrose 50% Injectable 25 Gram(s) IV Push once  dextrose 50% Injectable 25 Gram(s) IV Push once  epoetin estelle Injectable 3000 Unit(s) IV Push <User Schedule>  insulin lispro (HumaLOG) corrective regimen sliding scale   SubCutaneous at bedtime  insulin lispro (HumaLOG) corrective regimen sliding scale   SubCutaneous three times a day before meals  lactobacillus acidophilus 1 Tablet(s) Oral every 12 hours  metoprolol 25 milliGRAM(s) Oral two times a day  Nephro-emil 1 Tablet(s) Oral daily  NIFEdipine XL 90 milliGRAM(s) Oral daily  pantoprazole    Tablet 40 milliGRAM(s) Oral before breakfast  vancomycin  IVPB 500 milliGRAM(s) IV Intermittent <User Schedule>      TELEMETRY: 	    ECG:  	  RADIOLOGY:   DIAGNOSTIC TESTING:  [ ] Echocardiogram:  [ ]  Catheterization:  [ ] Stress Test:    OTHER: 	    LABS:	 	                                10.1   6.08  )-----------( 87       ( 19 Oct 2017 06:15 )             29.4     10-19    137  |  96<L>  |  19  ----------------------------<  142<H>  4.6   |  30  |  4.91<H>    Ca    7.6<L>      19 Oct 2017 06:15  Phos  3.1     10-19  Mg     2.0     10-19    TPro  6.5  /  Alb  3.4  /  TBili  0.5  /  DBili  x   /  AST  16  /  ALT  10  /  AlkPhos  48  10-18    PT/INR - ( 17 Oct 2017 17:43 )   PT: 11.7 SEC;   INR: 1.04          PTT - ( 17 Oct 2017 17:43 )  PTT:33.5 SEC CARDIOLOGY FOLLOW UP - Dr. Flaherty    CC no chest pain or sob        PHYSICAL EXAM:  T(C): 37 (10-19-17 @ 14:15), Max: 37 (10-19-17 @ 06:39)  HR: 60 (10-19-17 @ 14:15) (54 - 61)  BP: 118/55 (10-19-17 @ 14:15) (118/55 - 150/53)  RR: 18 (10-19-17 @ 14:15) (16 - 18)  SpO2: 99% (10-19-17 @ 14:15) (99% - 100%)  Wt(kg): --  I&O's Summary    18 Oct 2017 07:01  -  19 Oct 2017 07:00  --------------------------------------------------------  IN: 600 mL / OUT: 1900 mL / NET: -1300 mL        Appearance: Normal	  Cardiovascular: Normal S1 S2,RRR, + sys murmur   Respiratory: Lungs clear to auscultation	  Gastrointestinal:  Soft, Non-tender, + BS	  Extremities: Normal range of motion, facial edema        MEDICATIONS  (STANDING):  aspirin enteric coated 81 milliGRAM(s) Oral daily  atorvastatin 80 milliGRAM(s) Oral at bedtime  cefTAZidime  IVPB 2 Gram(s) IV Intermittent <User Schedule>  ciprofloxacin/dexamethasone Suspension Otic 4 Drop(s) Left Ear four times a day  cloNIDine 0.1 milliGRAM(s) Oral two times a day  dextrose 5%. 1000 milliLiter(s) (50 mL/Hr) IV Continuous <Continuous>  dextrose 50% Injectable 12.5 Gram(s) IV Push once  dextrose 50% Injectable 25 Gram(s) IV Push once  dextrose 50% Injectable 25 Gram(s) IV Push once  epoetin estelle Injectable 3000 Unit(s) IV Push <User Schedule>  insulin lispro (HumaLOG) corrective regimen sliding scale   SubCutaneous at bedtime  insulin lispro (HumaLOG) corrective regimen sliding scale   SubCutaneous three times a day before meals  lactobacillus acidophilus 1 Tablet(s) Oral every 12 hours  metoprolol 25 milliGRAM(s) Oral two times a day  Nephro-emil 1 Tablet(s) Oral daily  NIFEdipine XL 90 milliGRAM(s) Oral daily  pantoprazole    Tablet 40 milliGRAM(s) Oral before breakfast  vancomycin  IVPB 500 milliGRAM(s) IV Intermittent <User Schedule>      TELEMETRY: 	    ECG:  sinus bradycardia with 1st degree AVB  HR 55, lateral ischemia (old)   peaked T waves v2   RADIOLOGY:   DIAGNOSTIC TESTING:  [ ] Echocardiogram:  [ ]  Catheterization:  [ ] Stress Test:    OTHER: 	    LABS:	 	                                10.1   6.08  )-----------( 87       ( 19 Oct 2017 06:15 )             29.4     10-19    137  |  96<L>  |  19  ----------------------------<  142<H>  4.6   |  30  |  4.91<H>    Ca    7.6<L>      19 Oct 2017 06:15  Phos  3.1     10-19  Mg     2.0     10-19    TPro  6.5  /  Alb  3.4  /  TBili  0.5  /  DBili  x   /  AST  16  /  ALT  10  /  AlkPhos  48  10-18    PT/INR - ( 17 Oct 2017 17:43 )   PT: 11.7 SEC;   INR: 1.04          PTT - ( 17 Oct 2017 17:43 )  PTT:33.5 SEC

## 2017-10-19 NOTE — SWALLOW BEDSIDE ASSESSMENT ADULT - SWALLOW EVAL: RECOMMENDED FEEDING/EATING TECHNIQUES
maintain upright posture during/after eating for 30 mins/small sips/bites/alternate food with liquid/allow for swallow between intakes/position upright (90 degrees)

## 2017-10-19 NOTE — PROGRESS NOTE ADULT - SUBJECTIVE AND OBJECTIVE BOX
chief complaint : left face cellulitis        SUBJECTIVE / OVERNIGHT EVENTS: pt appears comfortable, denies chest pain, shortness of breath, nausea, vomiting facial pain       MEDICATIONS  (STANDING):  aspirin enteric coated 81 milliGRAM(s) Oral daily  atorvastatin 80 milliGRAM(s) Oral at bedtime  cefTAZidime  IVPB 2 Gram(s) IV Intermittent <User Schedule>  ciprofloxacin/dexamethasone Suspension Otic 4 Drop(s) Left Ear four times a day  cloNIDine 0.1 milliGRAM(s) Oral two times a day  dextrose 5%. 1000 milliLiter(s) (50 mL/Hr) IV Continuous <Continuous>  dextrose 50% Injectable 12.5 Gram(s) IV Push once  dextrose 50% Injectable 25 Gram(s) IV Push once  dextrose 50% Injectable 25 Gram(s) IV Push once  epoetin estelle Injectable 3000 Unit(s) IV Push <User Schedule>  insulin lispro (HumaLOG) corrective regimen sliding scale   SubCutaneous at bedtime  insulin lispro (HumaLOG) corrective regimen sliding scale   SubCutaneous three times a day before meals  lactobacillus acidophilus 1 Tablet(s) Oral every 12 hours  metoprolol 12.5 milliGRAM(s) Oral two times a day  Nephro-emil 1 Tablet(s) Oral daily  NIFEdipine XL 90 milliGRAM(s) Oral daily  pantoprazole    Tablet 40 milliGRAM(s) Oral before breakfast  vancomycin  IVPB 500 milliGRAM(s) IV Intermittent <User Schedule>    MEDICATIONS  (PRN):  dextrose Gel 1 Dose(s) Oral once PRN Blood Glucose LESS THAN 70 milliGRAM(s)/deciliter  glucagon  Injectable 1 milliGRAM(s) IntraMuscular once PRN Glucose LESS THAN 70 milligrams/deciliter        CAPILLARY BLOOD GLUCOSE      POCT Blood Glucose.: 271 mg/dL (19 Oct 2017 22:06)  POCT Blood Glucose.: 219 mg/dL (19 Oct 2017 16:44)  POCT Blood Glucose.: 268 mg/dL (19 Oct 2017 12:13)  POCT Blood Glucose.: 172 mg/dL (19 Oct 2017 08:23)  POCT Blood Glucose.: 140 mg/dL (19 Oct 2017 06:56)    I&O's Summary    18 Oct 2017 07:01  -  19 Oct 2017 07:00  --------------------------------------------------------  IN: 600 mL / OUT: 1900 mL / NET: -1300 mL        Constitutional: No fever, fatigue  Skin: No rash.  Eyes: No recent vision problems or eye pain.  ENT: No congestion, ear pain, or sore throat.  Cardiovascular: No chest pain or palpation.  Respiratory: No cough, shortness of breath, congestion, or wheezing.  Gastrointestinal: No abdominal pain, nausea, vomiting, or diarrhea.  Genitourinary: No dysuria.  Musculoskeletal: No joint swelling.  Neurologic: No headache.    PHYSICAL EXAM:  GENERAL: NAD  EYES: EOMI, PERRLA  NECK: Supple, No JVD  dec left face swelling / erythema  CHEST/LUNG:   HEART:  S1 , S2 +  ABDOMEN: soft, bs+  EXTREMITIES:  trace edema  NEUROLOGY: alert awake       LABS:                        10.1   6.08  )-----------( 87       ( 19 Oct 2017 06:15 )             29.4     10-    137  |  96<L>  |  19  ----------------------------<  142<H>  4.6   |  30  |  4.91<H>    Ca    7.6<L>      19 Oct 2017 06:15  Phos  3.1     10-  Mg     2.0     10-    TPro  6.5  /  Alb  3.4  /  TBili  0.5  /  DBili  x   /  AST  16  /  ALT  10  /  AlkPhos  48  10-18          Urinalysis Basic - ( 19 Oct 2017 07:00 )    Color: YELLOW / Appearance: CLEAR / S.010 / pH: 8.5  Gluc: 250 / Ketone: NEGATIVE  / Bili: NEGATIVE / Urobili: NORMAL E.U.   Blood: NEGATIVE / Protein: 300 / Nitrite: NEGATIVE   Leuk Esterase: NEGATIVE / RBC: 0-2 / WBC 0-2   Sq Epi: OCC / Non Sq Epi: x / Bacteria: x        RADIOLOGY & ADDITIONAL TESTS:    Imaging Personally Reviewed:    Consultant(s) Notes Reviewed:      Care Discussed with Consultants/Other Providers:

## 2017-10-19 NOTE — PROGRESS NOTE ADULT - ASSESSMENT
76 year old with ESRD and DM with otitis externa with associated facial cellulitis.    Concern for invasive otitis.    ENT Follow up .    Plan for 6 weeks vacno and ceftaz- can be dosed at HD. 76 year old with ESRD and DM with otitis externa with associated facial cellulitis.    I discussed the case with ENT.  Clinically, there is not a concern for mastoiditis or malignant otitis extermia.    Treat facial cellulitis with vanco /ceftaz dosed at HD through 10/27.    Call with questions

## 2017-10-19 NOTE — SWALLOW BEDSIDE ASSESSMENT ADULT - SWALLOW EVAL: DIAGNOSIS
1. Patient presents with functional oral stage skills for puree, solids and thin liquids via cup and straw marked by adequate bolus formation, transfer and clearance.  2. Patient with functional pharyngeal stage of swallow for puree, solids and thin liquids via straw and cup marked by prompt swallow trigger, adequate hyolaryngeal elevation and no overt signs and symptoms of penetration/aspiration.

## 2017-10-19 NOTE — PROGRESS NOTE ADULT - ASSESSMENT
75yo M with CVA, intracranial stenosis  1. recommend asa and plavix, liptor 80  2. PAT to ro embolic source  3. abx per primary team

## 2017-10-19 NOTE — PROGRESS NOTE ADULT - PROBLEM SELECTOR PLAN 2
BP still above goal but improving . On 10/17/17 patient not given medications. Procardia increased 10/18/17 to 90 mg daily in an effort to taper off clonidine (decrease to 0.1 mg twice daily) as patient with labile pressures as outpatient. Decreased metoprolol to 25 mg twice daily given bradycardia. Monitor BP/HR. BP improved. Cardiology decreased metoprolol to 12.5 mg twice daily given bradycardia. c/w Procardia. If BP allows will plan to taper off clonidine (decrease to 0.1 mg twice daily) as patient with labile pressures as outpatient. Monitor BP/HR.

## 2017-10-19 NOTE — SWALLOW BEDSIDE ASSESSMENT ADULT - COMMENTS
Patient seen at bedside, seated upright in bed with wife present.  Patient alert and cooperative.  Patient with no coughing, no throat clearing and no wet vocal quality at baseline.  As per wife, patient eating well and does not demonstrate any difficulty despite Left facial cellulitis.

## 2017-10-19 NOTE — PROGRESS NOTE ADULT - ATTENDING COMMENTS
[facility] NEPHROLOGY  Ihsan Saba M.D.  Eduardo Romano D.O.  Loni Ribeiro M.D.  Maris Ramirez, MSN, ANP-C    Telephone: [phone]  Facsimile: [phone]    [address] [place] [address] Patient seen and examined. Agree with plan above.  Note edited as necessary.  [facility] NEPHROLOGY  Ihsan Saba M.D.  Eduardo Romano D.O.  Loni Ribeiro M.D.  Maris Ramirez, MSN, ANP-C    Telephone: [phone]  Facsimile: [phone]    [address] [place] [id number]

## 2017-10-19 NOTE — PROGRESS NOTE ADULT - SUBJECTIVE AND OBJECTIVE BOX
Follow Up:      Inverval History/ROS:Patient is a 76y old  Male who presents with change in MS.    Discussd with wife, at present, patient is close to his baseline.  Red macule to his left temple has beent here for about a week- getting better.         Allergies    No Known Allergies    Intolerances        ANTIMICROBIALS:  cefTAZidime  IVPB 2 <User Schedule>      OTHER MEDS:  aspirin enteric coated 81 milliGRAM(s) Oral daily  atorvastatin 80 milliGRAM(s) Oral at bedtime  ciprofloxacin/dexamethasone Suspension Otic 4 Drop(s) Left Ear four times a day  cloNIDine 0.1 milliGRAM(s) Oral two times a day  dextrose 5%. 1000 milliLiter(s) IV Continuous <Continuous>  dextrose 50% Injectable 12.5 Gram(s) IV Push once  dextrose 50% Injectable 25 Gram(s) IV Push once  dextrose 50% Injectable 25 Gram(s) IV Push once  dextrose Gel 1 Dose(s) Oral once PRN  epoetin estelle Injectable 3000 Unit(s) IV Push <User Schedule>  glucagon  Injectable 1 milliGRAM(s) IntraMuscular once PRN  insulin lispro (HumaLOG) corrective regimen sliding scale   SubCutaneous at bedtime  lactobacillus acidophilus 1 Tablet(s) Oral every 12 hours  metoprolol 25 milliGRAM(s) Oral two times a day  Nephro-emil 1 Tablet(s) Oral daily  NIFEdipine XL 90 milliGRAM(s) Oral daily  pantoprazole    Tablet 40 milliGRAM(s) Oral before breakfast      Vital Signs Last 24 Hrs  T(C): 36.9 (19 Oct 2017 09:46), Max: 37 (19 Oct 2017 06:39)  T(F): 98.4 (19 Oct 2017 09:46), Max: 98.6 (19 Oct 2017 06:39)  HR: 58 (19 Oct 2017 09:46) (53 - 61)  BP: 124/52 (19 Oct 2017 09:46) (124/52 - 150/53)  BP(mean): --  RR: 18 (19 Oct 2017 09:46) (16 - 18)  SpO2: 100% (19 Oct 2017 09:46) (100% - 100%)    PHYSICAL EXAM:  General: [ ] non-toxic  HEAD/EYES: [ ] PERRL [ ] white sclera [ ] icterus  ENT:  [ ] normal [ ] supple [ ] thrush [ ] pharyngeal exudate  Cardiovascular:   [ ] murmur [ ] normal [ ] PPM/AICD  Respiratory:  [ ] clear to ausculation bilaterally  GI:  [ ] soft, non-tender, normal bowel sounds  :  [ ] cordoba [ ] no CVA tenderness   Musculoskeletal:  [ ] no synovitis  Neurologic:  [ ] non-focal exam   Skin:  [ ] no rash  Lymph: [ ] no lymphadenopathy  Psychiatric:  [ ] appropriate affect [ ] alert & oriented  Lines:  [ ] no phlebitis [ ] central line                                10.1   6.08  )-----------( 87       ( 19 Oct 2017 06:15 )             29.4       10-    137  |  96<L>  |  19  ----------------------------<  142<H>  4.6   |  30  |  4.91<H>    Ca    7.6<L>      19 Oct 2017 06:15  Phos  3.1     10-  Mg     2.0     10-    TPro  6.5  /  Alb  3.4  /  TBili  0.5  /  DBili  x   /  AST  16  /  ALT  10  /  AlkPhos  48  10-18      Urinalysis Basic - ( 19 Oct 2017 07:00 )    Color: YELLOW / Appearance: CLEAR / S.010 / pH: 8.5  Gluc: 250 / Ketone: NEGATIVE  / Bili: NEGATIVE / Urobili: NORMAL E.U.   Blood: NEGATIVE / Protein: 300 / Nitrite: NEGATIVE   Leuk Esterase: NEGATIVE / RBC: 0-2 / WBC 0-2   Sq Epi: OCC / Non Sq Epi: x / Bacteria: x        MICROBIOLOGY:    RADIOLOGY:

## 2017-10-19 NOTE — PROGRESS NOTE ADULT - ASSESSMENT
76 year old male with CAD/PCI, HTN, PVD, ESRD on HD admitted with + CVA / Facial cellulitis    1. cv stable   no chest pain or sob   check EKG  bradycardia noted  metoprolol decreased today from 50mg to 25 mg BID PO  trend HR   no evidence of decomp CHF on exam    recent echo revealing normal LV sys fx, mild MR/ AR , EF 65-70 %    2. CAD/PCI  stable   continue with ASA/ BB    3. Facial cellulitis  MRA/MRI/ CT head results noted   ENT f.u  + Mastoiditis, Left otitis externa  IV abx   ID f./u     4. HTN   bp acceptable   cont BB, procardia, clonidine     5. ESRD on hd  renal f.u     6. Neuro F/u   MRA/MRI head revealing new focal infarct   continue with ASA   add statin   dvt ppx 76 year old male with CAD/PCI, HTN, PVD, ESRD on HD admitted with + CVA / Facial cellulitis    1. cv stable   no chest pain or sob  ekg overall unchanged, with peaked T wave noted   monitor BMP  no evidence of ACS  bradycardia improving   decrease metoprolol to 12.5 mg BID PO  trend HR   no evidence of decomp CHF on exam    recent echo revealing normal LV sys fx, mild MR/ AR , EF 65-70 %    2. CAD/PCI  stable   continue with ASA/ BB    3. Facial cellulitis  MRA/MRI/ CT head results noted   ENT f.u  + Mastoiditis, Left otitis externa  IV abx   ID f./u     4. HTN   bp acceptable   cont BB, procardia, clonidine     5. ESRD on hd  renal f.u     6. Neuro F/u   MRA/MRI head revealing new focal infarct   continue with ASA   plavix/ statin started   pending PAT r/o embolic source     dvt ppx

## 2017-10-19 NOTE — PROGRESS NOTE ADULT - SUBJECTIVE AND OBJECTIVE BOX
Patient is a 76y old  Male who presents with a chief complaint of Left face cellulitis, Mastoiditis, Otitis Externa (18 Oct 2017 01:57)      HPI:    Pt seen and examined, no events noted, no new complaints at  this time  Vital Signs Last 24 Hrs  T(C): 36.9 (19 Oct 2017 09:46), Max: 37 (19 Oct 2017 06:39)  T(F): 98.4 (19 Oct 2017 09:46), Max: 98.6 (19 Oct 2017 06:39)  HR: 58 (19 Oct 2017 09:46) (53 - 61)  BP: 124/52 (19 Oct 2017 09:46) (124/52 - 150/53)  BP(mean): --  RR: 18 (19 Oct 2017 09:46) (16 - 18)  SpO2: 100% (19 Oct 2017 09:46) (100% - 100%)    Physical Exam    Mental Status- awake,alert,speech slow  CN- 2-12 grossly intact  Motor- PETERSEN  Sensory- intact Lt  Coordination- unreliable  Gait- deferred

## 2017-10-19 NOTE — PROGRESS NOTE ADULT - SUBJECTIVE AND OBJECTIVE BOX
Pt seen and examined at bedside.  No acute events overnight.  Pt and family member report the facial and ear swelling are much better.      afebrile, vss  awake, alert, interactive  breathing comfortably on room air  CN7 intact  some subjective decreased hearing in left ear(full of drops)  AS edema and erythema with near total resolution.  No tenderness on palpation of the tragus or pinna.  EAC open with some debris and drops in place.  AD normal  NC pink moist mucosa  OC/OP normal    WBC 6

## 2017-10-20 LAB
BUN SERPL-MCNC: 32 MG/DL — HIGH (ref 7–23)
CALCIUM SERPL-MCNC: 7.7 MG/DL — LOW (ref 8.4–10.5)
CHLORIDE SERPL-SCNC: 95 MMOL/L — LOW (ref 98–107)
CO2 SERPL-SCNC: 27 MMOL/L — SIGNIFICANT CHANGE UP (ref 22–31)
CREAT SERPL-MCNC: 6.88 MG/DL — HIGH (ref 0.5–1.3)
GLUCOSE BLDC GLUCOMTR-MCNC: 126 MG/DL — HIGH (ref 70–99)
GLUCOSE BLDC GLUCOMTR-MCNC: 199 MG/DL — HIGH (ref 70–99)
GLUCOSE BLDC GLUCOMTR-MCNC: 200 MG/DL — HIGH (ref 70–99)
GLUCOSE BLDC GLUCOMTR-MCNC: 256 MG/DL — HIGH (ref 70–99)
GLUCOSE SERPL-MCNC: 127 MG/DL — HIGH (ref 70–99)
HCT VFR BLD CALC: 29.8 % — LOW (ref 39–50)
HGB BLD-MCNC: 9.9 G/DL — LOW (ref 13–17)
MCHC RBC-ENTMCNC: 30.8 PG — SIGNIFICANT CHANGE UP (ref 27–34)
MCHC RBC-ENTMCNC: 33.2 % — SIGNIFICANT CHANGE UP (ref 32–36)
MCV RBC AUTO: 92.8 FL — SIGNIFICANT CHANGE UP (ref 80–100)
NRBC # FLD: 0 — SIGNIFICANT CHANGE UP
PLATELET # BLD AUTO: 61 K/UL — LOW (ref 150–400)
PMV BLD: 11.9 FL — SIGNIFICANT CHANGE UP (ref 7–13)
POTASSIUM SERPL-MCNC: 5.1 MMOL/L — SIGNIFICANT CHANGE UP (ref 3.5–5.3)
POTASSIUM SERPL-SCNC: 5.1 MMOL/L — SIGNIFICANT CHANGE UP (ref 3.5–5.3)
RBC # BLD: 3.21 M/UL — LOW (ref 4.2–5.8)
RBC # FLD: 13.7 % — SIGNIFICANT CHANGE UP (ref 10.3–14.5)
SODIUM SERPL-SCNC: 135 MMOL/L — SIGNIFICANT CHANGE UP (ref 135–145)
WBC # BLD: 7.25 K/UL — SIGNIFICANT CHANGE UP (ref 3.8–10.5)
WBC # FLD AUTO: 7.25 K/UL — SIGNIFICANT CHANGE UP (ref 3.8–10.5)

## 2017-10-20 PROCEDURE — 99232 SBSQ HOSP IP/OBS MODERATE 35: CPT

## 2017-10-20 RX ORDER — CIPROFLOXACIN LACTATE 400MG/40ML
500 VIAL (ML) INTRAVENOUS EVERY 24 HOURS
Qty: 0 | Refills: 0 | Status: DISCONTINUED | OUTPATIENT
Start: 2017-10-20 | End: 2017-10-27

## 2017-10-20 RX ORDER — NIFEDIPINE 30 MG
60 TABLET, EXTENDED RELEASE 24 HR ORAL EVERY 12 HOURS
Qty: 0 | Refills: 0 | Status: DISCONTINUED | OUTPATIENT
Start: 2017-10-20 | End: 2017-10-27

## 2017-10-20 RX ADMIN — Medication 81 MILLIGRAM(S): at 12:24

## 2017-10-20 RX ADMIN — Medication 1 TABLET(S): at 12:24

## 2017-10-20 RX ADMIN — CIPROFLOXACIN AND DEXAMETHASONE 4 DROP(S): 3; 1 SUSPENSION/ DROPS AURICULAR (OTIC) at 18:09

## 2017-10-20 RX ADMIN — Medication 90 MILLIGRAM(S): at 06:11

## 2017-10-20 RX ADMIN — Medication 3: at 17:22

## 2017-10-20 RX ADMIN — Medication 100 MILLIGRAM(S): at 18:09

## 2017-10-20 RX ADMIN — Medication 1 TABLET(S): at 18:09

## 2017-10-20 RX ADMIN — CIPROFLOXACIN AND DEXAMETHASONE 4 DROP(S): 3; 1 SUSPENSION/ DROPS AURICULAR (OTIC) at 12:23

## 2017-10-20 RX ADMIN — Medication 1: at 12:23

## 2017-10-20 RX ADMIN — ERYTHROPOIETIN 3000 UNIT(S): 10000 INJECTION, SOLUTION INTRAVENOUS; SUBCUTANEOUS at 21:48

## 2017-10-20 RX ADMIN — CIPROFLOXACIN AND DEXAMETHASONE 4 DROP(S): 3; 1 SUSPENSION/ DROPS AURICULAR (OTIC) at 00:48

## 2017-10-20 RX ADMIN — PANTOPRAZOLE SODIUM 40 MILLIGRAM(S): 20 TABLET, DELAYED RELEASE ORAL at 06:11

## 2017-10-20 RX ADMIN — CIPROFLOXACIN AND DEXAMETHASONE 4 DROP(S): 3; 1 SUSPENSION/ DROPS AURICULAR (OTIC) at 06:11

## 2017-10-20 RX ADMIN — ATORVASTATIN CALCIUM 80 MILLIGRAM(S): 80 TABLET, FILM COATED ORAL at 20:53

## 2017-10-20 RX ADMIN — Medication 1 TABLET(S): at 06:11

## 2017-10-20 RX ADMIN — Medication 0.1 MILLIGRAM(S): at 06:11

## 2017-10-20 RX ADMIN — Medication 500 MILLIGRAM(S): at 18:09

## 2017-10-20 NOTE — PROGRESS NOTE ADULT - PROBLEM SELECTOR PLAN 1
Last HD on 10/18 tolerated well with 1.3L removed. Plan for maintenance HD today and will keep on MWF schedule for now. Monitor electrolytes.

## 2017-10-20 NOTE — PROGRESS NOTE ADULT - SUBJECTIVE AND OBJECTIVE BOX
[facility] NEPHROLOGY- PROGRESS NOTE    76 year old male h/o ESRD on HD presents with L facial swelling. Nephrology consulted for ESRD status.    REVIEW OF SYSTEMS:  Gen: no changes in weight, + left facial swelling improving  Cards: no chest pain  Resp: no dyspnea  GI: no nausea or vomiting or diarrhea  Vascular: no LE edema    No Known Allergies      Hospital Medications: Medications reviewed    VITALS:  T(F): 98.7 (10-20-17 @ 10:05), Max: 98.9 (10-19-17 @ 17:02)  HR: 64 (10-20-17 @ 10:05)  BP: 123/61 (10-20-17 @ 10:05)  RR: 16 (10-20-17 @ 10:05)  SpO2: 99% (10-20-17 @ 10:05)  Wt(kg): --      PHYSICAL EXAM:    Gen: NAD, calm, + left facial swelling  Cards: RRR, +S1/S2, + EH  Resp: CTA B/L  GI: soft, NT/ND, NABS  Vascular: no LE edema B/L, LUE AVF + bruit/thrill      LABS:  10-20    135  |  95<L>  |  32<H>  ----------------------------<  127<H>  5.1   |  27  |  6.88<H>    Ca    7.7<L>      20 Oct 2017 06:55  Phos  3.1     10-19  Mg     2.0     10-19      Creatinine Trend: 6.88 <--, 4.91 <--, 7.58 <--, 6.63 <--                        9.9    7.25  )-----------( 61       ( 20 Oct 2017 06:55 )             29.8

## 2017-10-20 NOTE — PROGRESS NOTE ADULT - PROBLEM SELECTOR PLAN 2
BP controlled. Will continue to taper off clonidine by discontinuing medication today and increase nifedipine to 60 mg twice daily. If BP becomes elevated, will start ARB. Monitor BP.

## 2017-10-20 NOTE — PROGRESS NOTE ADULT - SUBJECTIVE AND OBJECTIVE BOX
CARDIOLOGY FOLLOW UP - Dr. Flaherty    CC no chest pain or sob       PHYSICAL EXAM:  T(C): 36.3 (10-20-17 @ 12:59), Max: 37.2 (10-19-17 @ 17:02)  HR: 65 (10-20-17 @ 12:59) (57 - 65)  BP: 117/54 (10-20-17 @ 12:59) (117/54 - 129/59)  RR: 18 (10-20-17 @ 12:59) (16 - 18)  SpO2: 100% (10-20-17 @ 12:59) (98% - 100%)  Wt(kg): --  I&O's Summary      Appearance: Normal	  Cardiovascular: Normal S1 S2,RRR,+ 2/6 sys murmur   Respiratory: Lungs clear to auscultation	  Gastrointestinal:  Soft, Non-tender, + BS	  Extremities: Normal range of motion, facial edema        MEDICATIONS  (STANDING):  aspirin enteric coated 81 milliGRAM(s) Oral daily  atorvastatin 80 milliGRAM(s) Oral at bedtime  ciprofloxacin/dexamethasone Suspension Otic 4 Drop(s) Left Ear four times a day  dextrose 5%. 1000 milliLiter(s) (50 mL/Hr) IV Continuous <Continuous>  dextrose 50% Injectable 12.5 Gram(s) IV Push once  dextrose 50% Injectable 25 Gram(s) IV Push once  dextrose 50% Injectable 25 Gram(s) IV Push once  epoetin estelle Injectable 3000 Unit(s) IV Push <User Schedule>  insulin lispro (HumaLOG) corrective regimen sliding scale   SubCutaneous at bedtime  insulin lispro (HumaLOG) corrective regimen sliding scale   SubCutaneous three times a day before meals  lactobacillus acidophilus 1 Tablet(s) Oral every 12 hours  metoprolol 12.5 milliGRAM(s) Oral two times a day  Nephro-emil 1 Tablet(s) Oral daily  NIFEdipine XL 60 milliGRAM(s) Oral every 12 hours  pantoprazole    Tablet 40 milliGRAM(s) Oral before breakfast      TELEMETRY: 	    ECG:  	  RADIOLOGY:   DIAGNOSTIC TESTING:  [ ] Echocardiogram:  [ ]  Catheterization:  [ ] Stress Test:    OTHER: 	    LABS:	 	                                9.9    7.25  )-----------( 61       ( 20 Oct 2017 06:55 )             29.8     10-20    135  |  95<L>  |  32<H>  ----------------------------<  127<H>  5.1   |  27  |  6.88<H>    Ca    7.7<L>      20 Oct 2017 06:55  Phos  3.1     10-19  Mg     2.0     10-19

## 2017-10-20 NOTE — DIETITIAN INITIAL EVALUATION ADULT. - DIET TYPE
no concentrated potassium/renal replacement pts:no protein restr,no conc K & phos, low sodium/DASH/TLC (sodium and cholesterol restricted diet)/gastroesophageal reflux disease/low fat/no concentrated phosphorus/regular/low sodium/consistent carbohydrate (no snacks)

## 2017-10-20 NOTE — DIETITIAN INITIAL EVALUATION ADULT. - NS AS NUTRI INTERV MEALS SNACK
Diets modified for specific foods and ingredients/Other (specify)/1. Suggest: PO diet rx: Regular, Consistent Carbohydrate (no snacks), Renal Replacement (no prot restr, no conc K, no conc phos, low sodium);            2. Monitor PO diet tolerance;               3. Monitor labs, weights, hydration status;

## 2017-10-20 NOTE — PROGRESS NOTE ADULT - ASSESSMENT
75 yo male , with PMHx of CAD, Stent on ASA, HTN, ESRD on HD, DM, PVD , high cholesterol, mild dementia, presented with 3 days of lethargy and weakness (L>R) followed sudden onset of encephalopathy which described as unresponsiveness, during dialysis today as well as worsening left sided weakness. Exam does not show any substantial left sided weakness. CT H is positive for significant white matter disease with progressive tissue swelling over the left frontotemporal region as well as mastoiditis with indistinctness of bony walls with possible necrotizing otitis externa , likely severe otitis externa with surrounding cellulitis and underlying  soft tissue spread of infection, pt is accompanied with his wife, HX mostly obtained from wife, pt is A+O x2, pt c/o left shoulder pain, no CP, no SOB, no cough, no N/V, no dysuria, no HA, no dizziness, no abdominal pain, still making urine, + Left side of face swollen with erythema, pt was seen by ENT, Neuro, renal tonight, s/p IV Zosyn, IV Clinda tonight, no fever was reported, pt is mildly lethargic, but no distress, + Thrombocytopenia as per lab, elevated BP in the ER, S/P PO Clonidine 0.2 mg x 1 given in the ER,

## 2017-10-20 NOTE — PROGRESS NOTE ADULT - SUBJECTIVE AND OBJECTIVE BOX
Follow Up:      Inverval History/ROS:Patient is a 76y old  Male who presents with a chief complaint of Left face cellulitis, Mastoiditis, Otitis Externa (18 Oct 2017 01:57)    Overall, improving.  Platelets are low.     Allergies    No Known Allergies    Intolerances        ANTIMICROBIALS:  cefTAZidime  IVPB 2 <User Schedule>  vancomycin  IVPB 500 <User Schedule>      OTHER MEDS:  aspirin enteric coated 81 milliGRAM(s) Oral daily  atorvastatin 80 milliGRAM(s) Oral at bedtime  ciprofloxacin/dexamethasone Suspension Otic 4 Drop(s) Left Ear four times a day  dextrose 5%. 1000 milliLiter(s) IV Continuous <Continuous>  dextrose 50% Injectable 12.5 Gram(s) IV Push once  dextrose 50% Injectable 25 Gram(s) IV Push once  dextrose 50% Injectable 25 Gram(s) IV Push once  dextrose Gel 1 Dose(s) Oral once PRN  epoetin estelle Injectable 3000 Unit(s) IV Push <User Schedule>  glucagon  Injectable 1 milliGRAM(s) IntraMuscular once PRN  insulin lispro (HumaLOG) corrective regimen sliding scale   SubCutaneous at bedtime  insulin lispro (HumaLOG) corrective regimen sliding scale   SubCutaneous three times a day before meals  lactobacillus acidophilus 1 Tablet(s) Oral every 12 hours  metoprolol 12.5 milliGRAM(s) Oral two times a day  Nephro-emil 1 Tablet(s) Oral daily  NIFEdipine XL 60 milliGRAM(s) Oral every 12 hours  pantoprazole    Tablet 40 milliGRAM(s) Oral before breakfast      Vital Signs Last 24 Hrs  T(C): 36.3 (20 Oct 2017 12:59), Max: 37.2 (19 Oct 2017 17:02)  T(F): 97.4 (20 Oct 2017 12:59), Max: 98.9 (19 Oct 2017 17:02)  HR: 65 (20 Oct 2017 12:59) (57 - 65)  BP: 117/54 (20 Oct 2017 12:59) (117/54 - 129/59)  BP(mean): --  RR: 18 (20 Oct 2017 12:59) (16 - 18)  SpO2: 100% (20 Oct 2017 12:59) (98% - 100%)    PHYSICAL EXAM:  General: [x ] non-toxic  HEAD/EYES: [ ] PERRL [ ] white sclera [ ] icterus  ENT:  [ ] normal [x ] supple [ ] thrush [ ] pharyngeal exudate  Cardiovascular:   [ ] murmur [ ] normal [ ] PPM/AICD  Respiratory:  [ x] clear to ausculation bilaterally  GI:  [ x] soft, non-tender, normal bowel sounds  :  [ ] cordoba [x ] no CVA tenderness   Musculoskeletal:  [ ] no synovitis  Neurologic:  [ ] non-focal exam   Skin:  [ ] no change to macule on his face, Decreased fluid in ear canal.   less facial erythema  Lymph: [x ] no lymphadenopathy  Psychiatric:  [ ] appropriate affect [ ] alert & oriented  Lines:  [ ]x no phlebitis [ ] central line                                9.9    7.25  )-----------( 61       ( 20 Oct 2017 06:55 )             29.8       10-20    135  |  95<L>  |  32<H>  ----------------------------<  127<H>  5.1   |  27  |  6.88<H>    Ca    7.7<L>      20 Oct 2017 06:55  Phos  3.1     10-19  Mg     2.0     10-19        Urinalysis Basic - ( 19 Oct 2017 07:00 )    Color: YELLOW / Appearance: CLEAR / S.010 / pH: 8.5  Gluc: 250 / Ketone: NEGATIVE  / Bili: NEGATIVE / Urobili: NORMAL E.U.   Blood: NEGATIVE / Protein: 300 / Nitrite: NEGATIVE   Leuk Esterase: NEGATIVE / RBC: 0-2 / WBC 0-2   Sq Epi: OCC / Non Sq Epi: x / Bacteria: x        MICROBIOLOGY:    RADIOLOGY:

## 2017-10-20 NOTE — PROGRESS NOTE ADULT - ASSESSMENT
76 year old with ESRD and DM with otitis externa with associated facial cellulitis.    I discussed the case with ENT.  Clinically, there is not a concern for mastoiditis or malignant otitis extermia.    Continue abx trhough 10/27.    Decreased platelets- unclear cause. DDx is broad but both beta lactama and vacno can do this    Change to doxycycline and cipro.     If platelets continue to decrease, ? rei marques

## 2017-10-20 NOTE — PROGRESS NOTE ADULT - SUBJECTIVE AND OBJECTIVE BOX
chief complaint : left face cellulitis        SUBJECTIVE / OVERNIGHT EVENTS: pt appears comfortable, denies chest pain, shortness of breath, nausea, vomiting facial pain     MEDICATIONS  (STANDING):  aspirin enteric coated 81 milliGRAM(s) Oral daily  atorvastatin 80 milliGRAM(s) Oral at bedtime  ciprofloxacin     Tablet 500 milliGRAM(s) Oral every 24 hours  ciprofloxacin/dexamethasone Suspension Otic 4 Drop(s) Left Ear four times a day  dextrose 5%. 1000 milliLiter(s) (50 mL/Hr) IV Continuous <Continuous>  dextrose 50% Injectable 12.5 Gram(s) IV Push once  dextrose 50% Injectable 25 Gram(s) IV Push once  dextrose 50% Injectable 25 Gram(s) IV Push once  doxycycline hyclate Capsule 100 milliGRAM(s) Oral every 12 hours  epoetin estelle Injectable 3000 Unit(s) IV Push <User Schedule>  insulin lispro (HumaLOG) corrective regimen sliding scale   SubCutaneous at bedtime  insulin lispro (HumaLOG) corrective regimen sliding scale   SubCutaneous three times a day before meals  lactobacillus acidophilus 1 Tablet(s) Oral every 12 hours  metoprolol 12.5 milliGRAM(s) Oral two times a day  Nephro-emil 1 Tablet(s) Oral daily  NIFEdipine XL 60 milliGRAM(s) Oral every 12 hours  pantoprazole    Tablet 40 milliGRAM(s) Oral before breakfast    MEDICATIONS  (PRN):  dextrose Gel 1 Dose(s) Oral once PRN Blood Glucose LESS THAN 70 milliGRAM(s)/deciliter  glucagon  Injectable 1 milliGRAM(s) IntraMuscular once PRN Glucose LESS THAN 70 milligrams/deciliter      Vital Signs Last 24 Hrs  T(C): 36.5 (20 Oct 2017 21:10), Max: 37.1 (20 Oct 2017 10:05)  T(F): 97.7 (20 Oct 2017 21:10), Max: 98.7 (20 Oct 2017 10:05)  HR: 63 (20 Oct 2017 21:10) (57 - 72)  BP: 121/50 (20 Oct 2017 21:10) (117/54 - 124/57)  BP(mean): --  RR: 18 (20 Oct 2017 21:10) (16 - 18)  SpO2: 99% (20 Oct 2017 20:51) (99% - 100%)      Constitutional: No fever, fatigue  Skin: No rash.  Eyes: No recent vision problems or eye pain.  ENT: No congestion, ear pain, or sore throat.  Cardiovascular: No chest pain or palpation.  Respiratory: No cough, shortness of breath, congestion, or wheezing.  Gastrointestinal: No abdominal pain, nausea, vomiting, or diarrhea.  Genitourinary: No dysuria.  Musculoskeletal: No joint swelling.  Neurologic: No headache.    PHYSICAL EXAM:  GENERAL: NAD  EYES: EOMI, PERRLA  NECK: Supple, No JVD  dec left face swelling / erythema  CHEST/LUNG:   HEART:  S1 , S2 +  ABDOMEN: soft, bs+  EXTREMITIES:  trace edema  NEUROLOGY: alert awake     LABS:  10-20    135  |  95<L>  |  32<H>  ----------------------------<  127<H>  5.1   |  27  |  6.88<H>    Ca    7.7<L>      20 Oct 2017 06:55  Phos  3.1     10-19  Mg     2.0     10-19      Creatinine Trend: 6.88 <--, 4.91 <--, 7.58 <--, 6.63 <--                        9.9    7.25  )-----------( 61       ( 20 Oct 2017 06:55 )             29.8     Urine Studies:  Urinalysis Basic - ( 19 Oct 2017 07:00 )    Color: YELLOW / Appearance: CLEAR / S.010 / pH: 8.5  Gluc: 250 / Ketone: NEGATIVE  / Bili: NEGATIVE / Urobili: NORMAL E.U.   Blood: NEGATIVE / Protein: 300 / Nitrite: NEGATIVE   Leuk Esterase: NEGATIVE / RBC: 0-2 / WBC 0-2   Sq Epi: OCC / Non Sq Epi:  / Bacteria:

## 2017-10-20 NOTE — PROGRESS NOTE ADULT - ASSESSMENT
76 year old male with CAD/PCI, HTN, PVD, ESRD on HD admitted with + CVA / Facial cellulitis    1. cv stable   no chest pain or sob  bradycardia improving   trend HR   no evidence of decomp CHF on exam    recent echo revealing normal LV sys fx, mild MR/ AR , EF 65-70 %    2. CAD/PCI  stable   continue with ASA/ BB    3. Facial cellulitis  MRA/MRI/ CT head results noted   ENT f.u  + Mastoiditis, Left otitis externa  PO abx   ID f./u     4. HTN   bp acceptable   cont BB, procardia    5. ESRD on hd  renal f.u     6. Neuro F/u   MRA/MRI head revealing new focal infarct   continue with ASA /statin   pending PAT r/o embolic source     7. thrombocytopenia  med f/u    dvt ppx

## 2017-10-20 NOTE — PROGRESS NOTE ADULT - ATTENDING COMMENTS
[facility] NEPHROLOGY  Ihsan Saba M.D.  Eduardo Romano D.O.  Loni Ribeiro M.D.  Maris Ramirez, MSN, ANP-C    Telephone: [phone]  Facsimile: [phone]    [address] [place] [address]

## 2017-10-20 NOTE — DIETITIAN INITIAL EVALUATION ADULT. - OTHER INFO
Nutrition Consult X Dialysis. Pt 77 yo male with ESRD on dialysis. Pt appears awake, alert, but did not want to participate in the interview. Spouse @ bed side answered questions. Pt's appetite usually "fine"; No chew/swallow problem voiced; no nausea/vomiting/diarrhea reported @ present. Pt on dialysis x 6 years; Pt follows Renal diet @ home. Of note Pt passed Swallow Bedside Assessment Adult, SLP rec: Continue Regular consistency with thin liquids (10/19). No food related concerns voiced. RDN remains available, spouse made aware.

## 2017-10-20 NOTE — DIETITIAN INITIAL EVALUATION ADULT. - PERTINENT LABORATORY DATA
(10/20) H/H 9.9/29.8 L, PLT 61 L, Cl 95 H, BUN 32 H, Creat 6.88 H, Glu 127 H;            (10/19) HDL 27 L, HbA1c 5.7% H

## 2017-10-21 LAB
ALBUMIN SERPL ELPH-MCNC: 3.3 G/DL — SIGNIFICANT CHANGE UP (ref 3.3–5)
ALP SERPL-CCNC: 46 U/L — SIGNIFICANT CHANGE UP (ref 40–120)
ALT FLD-CCNC: 8 U/L — SIGNIFICANT CHANGE UP (ref 4–41)
AST SERPL-CCNC: 23 U/L — SIGNIFICANT CHANGE UP (ref 4–40)
BASOPHILS # BLD AUTO: 0.04 K/UL — SIGNIFICANT CHANGE UP (ref 0–0.2)
BASOPHILS NFR BLD AUTO: 0.6 % — SIGNIFICANT CHANGE UP (ref 0–2)
BILIRUB SERPL-MCNC: 0.7 MG/DL — SIGNIFICANT CHANGE UP (ref 0.2–1.2)
BUN SERPL-MCNC: 17 MG/DL — SIGNIFICANT CHANGE UP (ref 7–23)
CALCIUM SERPL-MCNC: 7.8 MG/DL — LOW (ref 8.4–10.5)
CHLORIDE SERPL-SCNC: 99 MMOL/L — SIGNIFICANT CHANGE UP (ref 98–107)
CO2 SERPL-SCNC: 27 MMOL/L — SIGNIFICANT CHANGE UP (ref 22–31)
CREAT SERPL-MCNC: 5.1 MG/DL — HIGH (ref 0.5–1.3)
EOSINOPHIL # BLD AUTO: 0.31 K/UL — SIGNIFICANT CHANGE UP (ref 0–0.5)
EOSINOPHIL NFR BLD AUTO: 4.7 % — SIGNIFICANT CHANGE UP (ref 0–6)
GLUCOSE BLDC GLUCOMTR-MCNC: 144 MG/DL — HIGH (ref 70–99)
GLUCOSE BLDC GLUCOMTR-MCNC: 155 MG/DL — HIGH (ref 70–99)
GLUCOSE BLDC GLUCOMTR-MCNC: 221 MG/DL — HIGH (ref 70–99)
GLUCOSE BLDC GLUCOMTR-MCNC: 97 MG/DL — SIGNIFICANT CHANGE UP (ref 70–99)
GLUCOSE SERPL-MCNC: 155 MG/DL — HIGH (ref 70–99)
HCT VFR BLD CALC: 28 % — LOW (ref 39–50)
HGB BLD-MCNC: 9.5 G/DL — LOW (ref 13–17)
IMM GRANULOCYTES # BLD AUTO: 0.02 # — SIGNIFICANT CHANGE UP
IMM GRANULOCYTES NFR BLD AUTO: 0.3 % — SIGNIFICANT CHANGE UP (ref 0–1.5)
LYMPHOCYTES # BLD AUTO: 1.57 K/UL — SIGNIFICANT CHANGE UP (ref 1–3.3)
LYMPHOCYTES # BLD AUTO: 23.6 % — SIGNIFICANT CHANGE UP (ref 13–44)
MAGNESIUM SERPL-MCNC: 1.9 MG/DL — SIGNIFICANT CHANGE UP (ref 1.6–2.6)
MCHC RBC-ENTMCNC: 31.6 PG — SIGNIFICANT CHANGE UP (ref 27–34)
MCHC RBC-ENTMCNC: 33.9 % — SIGNIFICANT CHANGE UP (ref 32–36)
MCV RBC AUTO: 93 FL — SIGNIFICANT CHANGE UP (ref 80–100)
MONOCYTES # BLD AUTO: 0.84 K/UL — SIGNIFICANT CHANGE UP (ref 0–0.9)
MONOCYTES NFR BLD AUTO: 12.6 % — SIGNIFICANT CHANGE UP (ref 2–14)
NEUTROPHILS # BLD AUTO: 3.88 K/UL — SIGNIFICANT CHANGE UP (ref 1.8–7.4)
NEUTROPHILS NFR BLD AUTO: 58.2 % — SIGNIFICANT CHANGE UP (ref 43–77)
NRBC # FLD: 0 — SIGNIFICANT CHANGE UP
PHOSPHATE SERPL-MCNC: 3.1 MG/DL — SIGNIFICANT CHANGE UP (ref 2.5–4.5)
PLATELET # BLD AUTO: 87 K/UL — LOW (ref 150–400)
PMV BLD: 11.1 FL — SIGNIFICANT CHANGE UP (ref 7–13)
POTASSIUM SERPL-MCNC: 4.6 MMOL/L — SIGNIFICANT CHANGE UP (ref 3.5–5.3)
POTASSIUM SERPL-SCNC: 4.6 MMOL/L — SIGNIFICANT CHANGE UP (ref 3.5–5.3)
PROT SERPL-MCNC: 6.2 G/DL — SIGNIFICANT CHANGE UP (ref 6–8.3)
RBC # BLD: 3.01 M/UL — LOW (ref 4.2–5.8)
RBC # FLD: 13.8 % — SIGNIFICANT CHANGE UP (ref 10.3–14.5)
SODIUM SERPL-SCNC: 138 MMOL/L — SIGNIFICANT CHANGE UP (ref 135–145)
WBC # BLD: 6.66 K/UL — SIGNIFICANT CHANGE UP (ref 3.8–10.5)
WBC # FLD AUTO: 6.66 K/UL — SIGNIFICANT CHANGE UP (ref 3.8–10.5)

## 2017-10-21 RX ORDER — ACETAMINOPHEN 500 MG
650 TABLET ORAL EVERY 6 HOURS
Qty: 0 | Refills: 0 | Status: DISCONTINUED | OUTPATIENT
Start: 2017-10-21 | End: 2017-10-27

## 2017-10-21 RX ORDER — OXYCODONE HYDROCHLORIDE 5 MG/1
5 TABLET ORAL EVERY 6 HOURS
Qty: 0 | Refills: 0 | Status: DISCONTINUED | OUTPATIENT
Start: 2017-10-21 | End: 2017-10-27

## 2017-10-21 RX ADMIN — Medication 650 MILLIGRAM(S): at 12:01

## 2017-10-21 RX ADMIN — Medication 650 MILLIGRAM(S): at 12:35

## 2017-10-21 RX ADMIN — CIPROFLOXACIN AND DEXAMETHASONE 4 DROP(S): 3; 1 SUSPENSION/ DROPS AURICULAR (OTIC) at 23:57

## 2017-10-21 RX ADMIN — CIPROFLOXACIN AND DEXAMETHASONE 4 DROP(S): 3; 1 SUSPENSION/ DROPS AURICULAR (OTIC) at 12:01

## 2017-10-21 RX ADMIN — ATORVASTATIN CALCIUM 80 MILLIGRAM(S): 80 TABLET, FILM COATED ORAL at 22:36

## 2017-10-21 RX ADMIN — Medication 60 MILLIGRAM(S): at 19:14

## 2017-10-21 RX ADMIN — Medication 100 MILLIGRAM(S): at 19:15

## 2017-10-21 RX ADMIN — Medication 100 MILLIGRAM(S): at 06:39

## 2017-10-21 RX ADMIN — Medication 1 TABLET(S): at 12:01

## 2017-10-21 RX ADMIN — Medication 12.5 MILLIGRAM(S): at 19:16

## 2017-10-21 RX ADMIN — Medication 1: at 09:30

## 2017-10-21 RX ADMIN — CIPROFLOXACIN AND DEXAMETHASONE 4 DROP(S): 3; 1 SUSPENSION/ DROPS AURICULAR (OTIC) at 06:40

## 2017-10-21 RX ADMIN — Medication 1 TABLET(S): at 19:15

## 2017-10-21 RX ADMIN — PANTOPRAZOLE SODIUM 40 MILLIGRAM(S): 20 TABLET, DELAYED RELEASE ORAL at 06:39

## 2017-10-21 RX ADMIN — Medication 12.5 MILLIGRAM(S): at 06:39

## 2017-10-21 RX ADMIN — Medication 1 TABLET(S): at 06:39

## 2017-10-21 RX ADMIN — Medication 60 MILLIGRAM(S): at 06:45

## 2017-10-21 RX ADMIN — Medication 500 MILLIGRAM(S): at 19:16

## 2017-10-21 RX ADMIN — CIPROFLOXACIN AND DEXAMETHASONE 4 DROP(S): 3; 1 SUSPENSION/ DROPS AURICULAR (OTIC) at 02:32

## 2017-10-21 RX ADMIN — Medication 2: at 16:49

## 2017-10-21 RX ADMIN — CIPROFLOXACIN AND DEXAMETHASONE 4 DROP(S): 3; 1 SUSPENSION/ DROPS AURICULAR (OTIC) at 19:15

## 2017-10-21 RX ADMIN — Medication 81 MILLIGRAM(S): at 12:01

## 2017-10-21 NOTE — PROGRESS NOTE ADULT - PROBLEM SELECTOR PLAN 2
BP acceptable off clonidine entirely, now on nifedipine xl bid.  Continue with current anti-hypertensive medications. Monitor BP.

## 2017-10-21 NOTE — PROGRESS NOTE ADULT - SUBJECTIVE AND OBJECTIVE BOX
[facility] NEPHROLOGY- PROGRESS NOTE    76 year old male h/o ESRD on HD presents with L facial swelling. Nephrology consulted for ESRD status.    Overall pt is weak.  Wife reports that pt can no longer walk and has been getting progressively more debilitated.    REVIEW OF SYSTEMS: unable to obtain today.    No Known Allergies  Hospital Medications: Medications reviewed    VITALS:  T(F): 98.4 (10-21-17 @ 10:00), Max: 98.4 (10-20-17 @ 18:22)  HR: 74 (10-21-17 @ 10:00)  BP: 134/71 (10-21-17 @ 10:00)  RR: 18 (10-21-17 @ 10:00)  SpO2: 100% (10-21-17 @ 10:00)  Wt(kg): --    10-20 @ 07:01  -  10-21 @ 07:00  --------------------------------------------------------  IN: 400 mL / OUT: 2200 mL / NET: -1800 mL      PHYSICAL EXAM:  Gen: NAD, calm, left facial swelling improved  Cards: RRR, +S1/S2, + EH  Resp: CTA B/L  GI: soft, NT/ND, NABS  Vascular: no LE edema B/L, LUE AVF + bruit/thrill      LABS:  10-21    138  |  99  |  17  ----------------------------<  155<H>  4.6   |  27  |  5.10<H>    Ca    7.8<L>      21 Oct 2017 06:00  Phos  3.1     10-21  Mg     1.9     10-21    TPro  6.2  /  Alb  3.3  /  TBili  0.7  /  DBili      /  AST  23  /  ALT  8   /  AlkPhos  46  10-21    Creatinine Trend: 5.10 <--, 6.88 <--, 4.91 <--, 7.58 <--, 6.63 <--                        9.5    6.66  )-----------( 87       ( 21 Oct 2017 06:00 )             28.0     Urine Studies:  Urinalysis Basic - ( 19 Oct 2017 07:00 )    Color: YELLOW / Appearance: CLEAR / S.010 / pH: 8.5  Gluc: 250 / Ketone: NEGATIVE  / Bili: NEGATIVE / Urobili: NORMAL E.U.   Blood: NEGATIVE / Protein: 300 / Nitrite: NEGATIVE   Leuk Esterase: NEGATIVE / RBC: 0-2 / WBC 0-2   Sq Epi: OCC / Non Sq Epi:  / Bacteria:

## 2017-10-21 NOTE — PROGRESS NOTE ADULT - SUBJECTIVE AND OBJECTIVE BOX
CC: no CP/SOB        PHYSICAL EXAM:    T(C): 36.6 (10-21-17 @ 06:36), Max: 37.1 (10-20-17 @ 10:05)  HR: 66 (10-21-17 @ 06:36) (58 - 72)  BP: 136/70 (10-21-17 @ 06:36) (117/54 - 141/69)  RR: 18 (10-21-17 @ 06:36) (16 - 18)  SpO2: 99% (10-21-17 @ 06:36) (99% - 100%)  Wt(kg): --  I&O's Summary    20 Oct 2017 07:01  -  21 Oct 2017 07:00  --------------------------------------------------------  IN: 400 mL / OUT: 2200 mL / NET: -1800 mL        Appearance: Normal	  Cardiovascular: Normal S1 S2,RRR, No JVD, No murmurs  Respiratory: Lungs clear to auscultation	  Gastrointestinal:  Soft, Non-tender, + BS	  Extremities: Normal range of motion, No clubbing, cyanosis or edema  Vascular: Peripheral pulses palpable 2+ bilaterally     LABS:	 	                          9.5    6.66  )-----------( 87       ( 21 Oct 2017 06:00 )             28.0     10-21    138  |  99  |  17  ----------------------------<  155<H>  4.6   |  27  |  5.10<H>    Ca    7.8<L>      21 Oct 2017 06:00  Phos  3.1     10-21  Mg     1.9     10-21    TPro  6.2  /  Alb  3.3  /  TBili  0.7  /  DBili  x   /  AST  23  /  ALT  8   /  AlkPhos  46  10-21          CARDIAC MARKERS:

## 2017-10-21 NOTE — PROGRESS NOTE ADULT - ATTENDING COMMENTS
[facility] NEPHROLOGY  Ihsan Saba M.D.  Eduardo Romano D.O.  Loni Ribeiro M.D.  Maris Ramirez, MSN, ANP-C    Telephone: [phone]  Facsimile: [phone]    [address] [place] [address]

## 2017-10-22 LAB
ALBUMIN SERPL ELPH-MCNC: 3.4 G/DL — SIGNIFICANT CHANGE UP (ref 3.3–5)
ALP SERPL-CCNC: 51 U/L — SIGNIFICANT CHANGE UP (ref 40–120)
ALT FLD-CCNC: 8 U/L — SIGNIFICANT CHANGE UP (ref 4–41)
AST SERPL-CCNC: 15 U/L — SIGNIFICANT CHANGE UP (ref 4–40)
BASOPHILS # BLD AUTO: 0.04 K/UL — SIGNIFICANT CHANGE UP (ref 0–0.2)
BASOPHILS NFR BLD AUTO: 0.5 % — SIGNIFICANT CHANGE UP (ref 0–2)
BILIRUB SERPL-MCNC: 0.5 MG/DL — SIGNIFICANT CHANGE UP (ref 0.2–1.2)
BUN SERPL-MCNC: 23 MG/DL — SIGNIFICANT CHANGE UP (ref 7–23)
CALCIUM SERPL-MCNC: 7.9 MG/DL — LOW (ref 8.4–10.5)
CHLORIDE SERPL-SCNC: 101 MMOL/L — SIGNIFICANT CHANGE UP (ref 98–107)
CO2 SERPL-SCNC: 27 MMOL/L — SIGNIFICANT CHANGE UP (ref 22–31)
CREAT SERPL-MCNC: 7.25 MG/DL — HIGH (ref 0.5–1.3)
EOSINOPHIL # BLD AUTO: 0.35 K/UL — SIGNIFICANT CHANGE UP (ref 0–0.5)
EOSINOPHIL NFR BLD AUTO: 4.2 % — SIGNIFICANT CHANGE UP (ref 0–6)
GLUCOSE BLDC GLUCOMTR-MCNC: 155 MG/DL — HIGH (ref 70–99)
GLUCOSE BLDC GLUCOMTR-MCNC: 168 MG/DL — HIGH (ref 70–99)
GLUCOSE BLDC GLUCOMTR-MCNC: 176 MG/DL — HIGH (ref 70–99)
GLUCOSE BLDC GLUCOMTR-MCNC: 191 MG/DL — HIGH (ref 70–99)
GLUCOSE SERPL-MCNC: 168 MG/DL — HIGH (ref 70–99)
HCT VFR BLD CALC: 27.9 % — LOW (ref 39–50)
HGB BLD-MCNC: 9.2 G/DL — LOW (ref 13–17)
IMM GRANULOCYTES # BLD AUTO: 0.02 # — SIGNIFICANT CHANGE UP
IMM GRANULOCYTES NFR BLD AUTO: 0.2 % — SIGNIFICANT CHANGE UP (ref 0–1.5)
LYMPHOCYTES # BLD AUTO: 1.93 K/UL — SIGNIFICANT CHANGE UP (ref 1–3.3)
LYMPHOCYTES # BLD AUTO: 22.9 % — SIGNIFICANT CHANGE UP (ref 13–44)
MAGNESIUM SERPL-MCNC: 2 MG/DL — SIGNIFICANT CHANGE UP (ref 1.6–2.6)
MCHC RBC-ENTMCNC: 31.4 PG — SIGNIFICANT CHANGE UP (ref 27–34)
MCHC RBC-ENTMCNC: 33 % — SIGNIFICANT CHANGE UP (ref 32–36)
MCV RBC AUTO: 95.2 FL — SIGNIFICANT CHANGE UP (ref 80–100)
MONOCYTES # BLD AUTO: 0.83 K/UL — SIGNIFICANT CHANGE UP (ref 0–0.9)
MONOCYTES NFR BLD AUTO: 9.8 % — SIGNIFICANT CHANGE UP (ref 2–14)
NEUTROPHILS # BLD AUTO: 5.26 K/UL — SIGNIFICANT CHANGE UP (ref 1.8–7.4)
NEUTROPHILS NFR BLD AUTO: 62.4 % — SIGNIFICANT CHANGE UP (ref 43–77)
NRBC # FLD: 0 — SIGNIFICANT CHANGE UP
PHOSPHATE SERPL-MCNC: 3.3 MG/DL — SIGNIFICANT CHANGE UP (ref 2.5–4.5)
PLATELET # BLD AUTO: 118 K/UL — LOW (ref 150–400)
PMV BLD: 10.7 FL — SIGNIFICANT CHANGE UP (ref 7–13)
POTASSIUM SERPL-MCNC: 5 MMOL/L — SIGNIFICANT CHANGE UP (ref 3.5–5.3)
POTASSIUM SERPL-SCNC: 5 MMOL/L — SIGNIFICANT CHANGE UP (ref 3.5–5.3)
PROT SERPL-MCNC: 6.4 G/DL — SIGNIFICANT CHANGE UP (ref 6–8.3)
RBC # BLD: 2.93 M/UL — LOW (ref 4.2–5.8)
RBC # FLD: 14.3 % — SIGNIFICANT CHANGE UP (ref 10.3–14.5)
SODIUM SERPL-SCNC: 141 MMOL/L — SIGNIFICANT CHANGE UP (ref 135–145)
WBC # BLD: 8.43 K/UL — SIGNIFICANT CHANGE UP (ref 3.8–10.5)
WBC # FLD AUTO: 8.43 K/UL — SIGNIFICANT CHANGE UP (ref 3.8–10.5)

## 2017-10-22 RX ADMIN — CIPROFLOXACIN AND DEXAMETHASONE 4 DROP(S): 3; 1 SUSPENSION/ DROPS AURICULAR (OTIC) at 21:49

## 2017-10-22 RX ADMIN — CIPROFLOXACIN AND DEXAMETHASONE 4 DROP(S): 3; 1 SUSPENSION/ DROPS AURICULAR (OTIC) at 06:04

## 2017-10-22 RX ADMIN — PANTOPRAZOLE SODIUM 40 MILLIGRAM(S): 20 TABLET, DELAYED RELEASE ORAL at 06:03

## 2017-10-22 RX ADMIN — Medication 100 MILLIGRAM(S): at 17:28

## 2017-10-22 RX ADMIN — Medication 1 TABLET(S): at 12:23

## 2017-10-22 RX ADMIN — Medication 500 MILLIGRAM(S): at 17:27

## 2017-10-22 RX ADMIN — Medication 1 TABLET(S): at 17:27

## 2017-10-22 RX ADMIN — Medication 1: at 08:48

## 2017-10-22 RX ADMIN — Medication 81 MILLIGRAM(S): at 12:23

## 2017-10-22 RX ADMIN — Medication 1: at 12:23

## 2017-10-22 RX ADMIN — Medication 12.5 MILLIGRAM(S): at 06:03

## 2017-10-22 RX ADMIN — CIPROFLOXACIN AND DEXAMETHASONE 4 DROP(S): 3; 1 SUSPENSION/ DROPS AURICULAR (OTIC) at 17:26

## 2017-10-22 RX ADMIN — Medication 60 MILLIGRAM(S): at 07:34

## 2017-10-22 RX ADMIN — Medication 100 MILLIGRAM(S): at 06:04

## 2017-10-22 RX ADMIN — Medication 1 TABLET(S): at 06:04

## 2017-10-22 RX ADMIN — Medication 60 MILLIGRAM(S): at 18:50

## 2017-10-22 RX ADMIN — Medication 12.5 MILLIGRAM(S): at 17:27

## 2017-10-22 RX ADMIN — CIPROFLOXACIN AND DEXAMETHASONE 4 DROP(S): 3; 1 SUSPENSION/ DROPS AURICULAR (OTIC) at 12:23

## 2017-10-22 RX ADMIN — ATORVASTATIN CALCIUM 80 MILLIGRAM(S): 80 TABLET, FILM COATED ORAL at 21:49

## 2017-10-22 RX ADMIN — Medication 1: at 17:26

## 2017-10-22 NOTE — PROGRESS NOTE ADULT - SUBJECTIVE AND OBJECTIVE BOX
CC: no acute events    TELEMETRY:     PHYSICAL EXAM:    T(C): 37.1 (10-22-17 @ 06:01), Max: 37.4 (10-21-17 @ 18:23)  HR: 65 (10-22-17 @ 07:31) (63 - 88)  BP: 136/52 (10-22-17 @ 07:31) (99/59 - 148/53)  RR: 18 (10-22-17 @ 06:01) (16 - 18)  SpO2: 98% (10-22-17 @ 06:01) (98% - 100%)  Wt(kg): --  I&O's Summary      Appearance: Normal	  Cardiovascular: Normal S1 S2,RRR, No JVD, No murmurs  Respiratory: Lungs clear to auscultation	  Gastrointestinal:  Soft, Non-tender, + BS	  Extremities: Normal range of motion, No clubbing, cyanosis or edema  Vascular: Peripheral pulses palpable 2+ bilaterally     LABS:	 	                          9.2    8.43  )-----------( 118      ( 22 Oct 2017 06:04 )             27.9     10-22    141  |  101  |  23  ----------------------------<  168<H>  5.0   |  27  |  7.25<H>    Ca    7.9<L>      22 Oct 2017 06:04  Phos  3.3     10-22  Mg     2.0     10-22    TPro  6.4  /  Alb  3.4  /  TBili  0.5  /  DBili  x   /  AST  15  /  ALT  8   /  AlkPhos  51  10-22          CARDIAC MARKERS:

## 2017-10-22 NOTE — PROGRESS NOTE ADULT - SUBJECTIVE AND OBJECTIVE BOX
chief complaint : left face cellulitis        SUBJECTIVE / OVERNIGHT EVENTS: pt appears comfortable, denies chest pain, shortness of breath, nausea, vomiting facial pain , pts family next to pt says that pt i stired to walk around requesting for PT     MEDICATIONS  (STANDING):  aspirin enteric coated 81 milliGRAM(s) Oral daily  atorvastatin 80 milliGRAM(s) Oral at bedtime  ciprofloxacin     Tablet 500 milliGRAM(s) Oral every 24 hours  ciprofloxacin/dexamethasone Suspension Otic 4 Drop(s) Left Ear four times a day  dextrose 5%. 1000 milliLiter(s) (50 mL/Hr) IV Continuous <Continuous>  dextrose 50% Injectable 12.5 Gram(s) IV Push once  dextrose 50% Injectable 25 Gram(s) IV Push once  dextrose 50% Injectable 25 Gram(s) IV Push once  doxycycline hyclate Capsule 100 milliGRAM(s) Oral every 12 hours  epoetin estelle Injectable 3000 Unit(s) IV Push <User Schedule>  insulin lispro (HumaLOG) corrective regimen sliding scale   SubCutaneous at bedtime  insulin lispro (HumaLOG) corrective regimen sliding scale   SubCutaneous three times a day before meals  lactobacillus acidophilus 1 Tablet(s) Oral every 12 hours  metoprolol 12.5 milliGRAM(s) Oral two times a day  Nephro-emil 1 Tablet(s) Oral daily  NIFEdipine XL 60 milliGRAM(s) Oral every 12 hours  pantoprazole    Tablet 40 milliGRAM(s) Oral before breakfast    MEDICATIONS  (PRN):  acetaminophen   Tablet. 650 milliGRAM(s) Oral every 6 hours PRN Mild Pain (1 - 3)  dextrose Gel 1 Dose(s) Oral once PRN Blood Glucose LESS THAN 70 milliGRAM(s)/deciliter  glucagon  Injectable 1 milliGRAM(s) IntraMuscular once PRN Glucose LESS THAN 70 milligrams/deciliter  oxyCODONE    IR 5 milliGRAM(s) Oral every 6 hours PRN moderate to severe pain    Vital Signs Last 24 Hrs  T(C): 37.1 (22 Oct 2017 18:49), Max: 37.3 (22 Oct 2017 18:24)  T(F): 98.7 (22 Oct 2017 18:49), Max: 99.1 (22 Oct 2017 18:24)  HR: 73 (22 Oct 2017 18:49) (63 - 78)  BP: 142/56 (22 Oct 2017 18:49) (117/43 - 152/65)  BP(mean): --  RR: 18 (22 Oct 2017 18:49) (16 - 18)  SpO2: 100% (22 Oct 2017 18:49) (98% - 100%)    Constitutional: No fever, fatigue  Skin: No rash.  Eyes: No recent vision problems or eye pain.  ENT: No congestion, ear pain, or sore throat.  Cardiovascular: No chest pain or palpation.  Respiratory: No cough, shortness of breath, congestion, or wheezing.  Gastrointestinal: No abdominal pain, nausea, vomiting, or diarrhea.  Genitourinary: No dysuria.  Musculoskeletal: No joint swelling.  Neurologic: No headache.    PHYSICAL EXAM:  GENERAL: NAD  EYES: EOMI, PERRLA  NECK: Supple, No JVD  dec left face swelling / erythema  CHEST/LUNG:   HEART:  S1 , S2 +  ABDOMEN: soft, bs+  EXTREMITIES:  NO  edema  NEUROLOGY: alert awake     LABS:  10-22    141  |  101  |  23  ----------------------------<  168<H>  5.0   |  27  |  7.25<H>    Ca    7.9<L>      22 Oct 2017 06:04  Phos  3.3     10-  Mg     2.0     10-22    TPro  6.4  /  Alb  3.4  /  TBili  0.5  /  DBili      /  AST  15  /  ALT  8   /  AlkPhos  51  10-    Creatinine Trend: 7.25 <--, 5.10 <--, 6.88 <--, 4.91 <--, 7.58 <--, 6.63 <--                        9.2    8.43  )-----------( 118      ( 22 Oct 2017 06:04 )             27.9     Urine Studies:  Urinalysis Basic - ( 19 Oct 2017 07:00 )    Color: YELLOW / Appearance: CLEAR / S.010 / pH: 8.5  Gluc: 250 / Ketone: NEGATIVE  / Bili: NEGATIVE / Urobili: NORMAL E.U.   Blood: NEGATIVE / Protein: 300 / Nitrite: NEGATIVE   Leuk Esterase: NEGATIVE / RBC: 0-2 / WBC 0-2   Sq Epi: OCC / Non Sq Epi:  / Bacteria:               LIVER FUNCTIONS - ( 22 Oct 2017 06:04 )  Alb: 3.4 g/dL / Pro: 6.4 g/dL / ALK PHOS: 51 u/L / ALT: 8 u/L / AST: 15 u/L / GGT: x

## 2017-10-22 NOTE — PROGRESS NOTE ADULT - SUBJECTIVE AND OBJECTIVE BOX
[facility] NEPHROLOGY- PROGRESS NOTE    76 year old male h/o ESRD on HD presents with L facial swelling. Nephrology consulted for ESRD status.    Overall pt is weak.  Wife reports that pt can no longer walk and has been getting progressively more debilitated.    REVIEW OF SYSTEMS: no h/a, cp, sob, abd pain.    No Known Allergies  Hospital Medications: Medications reviewed    VITALS:  T(F): 98.7 (10-22-17 @ 06:01), Max: 99.4 (10-21-17 @ 18:23)  HR: 65 (10-22-17 @ 07:31)  BP: 136/52 (10-22-17 @ 07:31)  RR: 18 (10-22-17 @ 06:01)  SpO2: 98% (10-22-17 @ 06:01)  Wt(kg): --      PHYSICAL EXAM:  Gen: NAD, calm, left facial swelling improved  Cards: RRR, +S1/S2, + EH  Resp: CTA B/L  GI: soft, NT/ND, NABS  Vascular: no LE edema B/L, LUE AVF + bruit/thrill    LABS:  10-22    141  |  101  |  23  ----------------------------<  168<H>  5.0   |  27  |  7.25<H>    Ca    7.9<L>      22 Oct 2017 06:04  Phos  3.3     10-22  Mg     2.0     10-22    TPro  6.4  /  Alb  3.4  /  TBili  0.5  /  DBili      /  AST  15  /  ALT  8   /  AlkPhos  51  10-22    Creatinine Trend: 7.25 <--, 5.10 <--, 6.88 <--, 4.91 <--, 7.58 <--, 6.63 <--                        9.2    8.43  )-----------( 118      ( 22 Oct 2017 06:04 )             27.9     Urine Studies:  Urinalysis Basic - ( 19 Oct 2017 07:00 )    Color: YELLOW / Appearance: CLEAR / S.010 / pH: 8.5  Gluc: 250 / Ketone: NEGATIVE  / Bili: NEGATIVE / Urobili: NORMAL E.U.   Blood: NEGATIVE / Protein: 300 / Nitrite: NEGATIVE   Leuk Esterase: NEGATIVE / RBC: 0-2 / WBC 0-2   Sq Epi: OCC / Non Sq Epi:  / Bacteria:

## 2017-10-22 NOTE — PROGRESS NOTE ADULT - ATTENDING COMMENTS
[facility] NEPHROLOGY  Ihsan Saba M.D.  Eduardo Romano D.O.  Loni Ribeiro M.D.  Maris Ramirez, MSN, ANP-C    Telephone: [phone]  Facsimile: [phone]    [address] [place] [address]

## 2017-10-23 ENCOUNTER — TRANSCRIPTION ENCOUNTER (OUTPATIENT)
Age: 77
End: 2017-10-23

## 2017-10-23 LAB
ALBUMIN SERPL ELPH-MCNC: 3.4 G/DL — SIGNIFICANT CHANGE UP (ref 3.3–5)
ALP SERPL-CCNC: 43 U/L — SIGNIFICANT CHANGE UP (ref 40–120)
ALT FLD-CCNC: 9 U/L — SIGNIFICANT CHANGE UP (ref 4–41)
AST SERPL-CCNC: 16 U/L — SIGNIFICANT CHANGE UP (ref 4–40)
BACTERIA BLD CULT: SIGNIFICANT CHANGE UP
BACTERIA BLD CULT: SIGNIFICANT CHANGE UP
BILIRUB SERPL-MCNC: 0.5 MG/DL — SIGNIFICANT CHANGE UP (ref 0.2–1.2)
BUN SERPL-MCNC: 30 MG/DL — HIGH (ref 7–23)
CALCIUM SERPL-MCNC: 7.9 MG/DL — LOW (ref 8.4–10.5)
CHLORIDE SERPL-SCNC: 103 MMOL/L — SIGNIFICANT CHANGE UP (ref 98–107)
CO2 SERPL-SCNC: 25 MMOL/L — SIGNIFICANT CHANGE UP (ref 22–31)
CREAT SERPL-MCNC: 8.6 MG/DL — HIGH (ref 0.5–1.3)
GLUCOSE BLDC GLUCOMTR-MCNC: 142 MG/DL — HIGH (ref 70–99)
GLUCOSE BLDC GLUCOMTR-MCNC: 167 MG/DL — HIGH (ref 70–99)
GLUCOSE BLDC GLUCOMTR-MCNC: 218 MG/DL — HIGH (ref 70–99)
GLUCOSE BLDC GLUCOMTR-MCNC: 98 MG/DL — SIGNIFICANT CHANGE UP (ref 70–99)
GLUCOSE SERPL-MCNC: 108 MG/DL — HIGH (ref 70–99)
HCT VFR BLD CALC: 31.9 % — LOW (ref 39–50)
HGB BLD-MCNC: 10.4 G/DL — LOW (ref 13–17)
MAGNESIUM SERPL-MCNC: 2.1 MG/DL — SIGNIFICANT CHANGE UP (ref 1.6–2.6)
MCHC RBC-ENTMCNC: 31.1 PG — SIGNIFICANT CHANGE UP (ref 27–34)
MCHC RBC-ENTMCNC: 32.6 % — SIGNIFICANT CHANGE UP (ref 32–36)
MCV RBC AUTO: 95.5 FL — SIGNIFICANT CHANGE UP (ref 80–100)
NRBC # FLD: 0 — SIGNIFICANT CHANGE UP
PHOSPHATE SERPL-MCNC: 4 MG/DL — SIGNIFICANT CHANGE UP (ref 2.5–4.5)
PLATELET # BLD AUTO: 186 K/UL — SIGNIFICANT CHANGE UP (ref 150–400)
PMV BLD: 10.4 FL — SIGNIFICANT CHANGE UP (ref 7–13)
POTASSIUM SERPL-MCNC: 5.5 MMOL/L — HIGH (ref 3.5–5.3)
POTASSIUM SERPL-SCNC: 5.5 MMOL/L — HIGH (ref 3.5–5.3)
PROT SERPL-MCNC: 6.3 G/DL — SIGNIFICANT CHANGE UP (ref 6–8.3)
RBC # BLD: 3.34 M/UL — LOW (ref 4.2–5.8)
RBC # FLD: 14.3 % — SIGNIFICANT CHANGE UP (ref 10.3–14.5)
SODIUM SERPL-SCNC: 142 MMOL/L — SIGNIFICANT CHANGE UP (ref 135–145)
WBC # BLD: 12.1 K/UL — HIGH (ref 3.8–10.5)
WBC # FLD AUTO: 12.1 K/UL — HIGH (ref 3.8–10.5)

## 2017-10-23 PROCEDURE — 99232 SBSQ HOSP IP/OBS MODERATE 35: CPT

## 2017-10-23 PROCEDURE — 99223 1ST HOSP IP/OBS HIGH 75: CPT | Mod: GC

## 2017-10-23 RX ADMIN — Medication 60 MILLIGRAM(S): at 05:43

## 2017-10-23 RX ADMIN — CIPROFLOXACIN AND DEXAMETHASONE 4 DROP(S): 3; 1 SUSPENSION/ DROPS AURICULAR (OTIC) at 12:26

## 2017-10-23 RX ADMIN — ERYTHROPOIETIN 3000 UNIT(S): 10000 INJECTION, SOLUTION INTRAVENOUS; SUBCUTANEOUS at 20:52

## 2017-10-23 RX ADMIN — CIPROFLOXACIN AND DEXAMETHASONE 4 DROP(S): 3; 1 SUSPENSION/ DROPS AURICULAR (OTIC) at 18:32

## 2017-10-23 RX ADMIN — Medication 12.5 MILLIGRAM(S): at 05:42

## 2017-10-23 RX ADMIN — Medication 1: at 12:25

## 2017-10-23 RX ADMIN — Medication 100 MILLIGRAM(S): at 17:42

## 2017-10-23 RX ADMIN — Medication 100 MILLIGRAM(S): at 05:42

## 2017-10-23 RX ADMIN — CIPROFLOXACIN AND DEXAMETHASONE 4 DROP(S): 3; 1 SUSPENSION/ DROPS AURICULAR (OTIC) at 23:55

## 2017-10-23 RX ADMIN — Medication 2: at 17:41

## 2017-10-23 RX ADMIN — Medication 12.5 MILLIGRAM(S): at 18:32

## 2017-10-23 RX ADMIN — PANTOPRAZOLE SODIUM 40 MILLIGRAM(S): 20 TABLET, DELAYED RELEASE ORAL at 05:43

## 2017-10-23 RX ADMIN — ATORVASTATIN CALCIUM 80 MILLIGRAM(S): 80 TABLET, FILM COATED ORAL at 23:55

## 2017-10-23 RX ADMIN — Medication 81 MILLIGRAM(S): at 12:25

## 2017-10-23 RX ADMIN — Medication 1 TABLET(S): at 05:42

## 2017-10-23 RX ADMIN — Medication 1 TABLET(S): at 17:42

## 2017-10-23 RX ADMIN — Medication 1 TABLET(S): at 12:26

## 2017-10-23 RX ADMIN — CIPROFLOXACIN AND DEXAMETHASONE 4 DROP(S): 3; 1 SUSPENSION/ DROPS AURICULAR (OTIC) at 05:43

## 2017-10-23 RX ADMIN — Medication 500 MILLIGRAM(S): at 18:32

## 2017-10-23 NOTE — DISCHARGE NOTE ADULT - PATIENT PORTAL LINK FT
“You can access the [employer] Patient Portal, offered by [facility], by registering with the following website: [email or web address]”

## 2017-10-23 NOTE — DISCHARGE NOTE ADULT - PROVIDER TOKENS
TOKAGUSTIN:'[id number]' TOKEN:'[id number]',TOKEN:'[id number]' TOKEN:'[id number]',TOKEN:'[id number]',TOKEN:'[id number]'

## 2017-10-23 NOTE — CHART NOTE - NSCHARTNOTEFT_GEN_A_CORE
received phone call from Dr. Weiss from neurology who is requesting a CTA head and neck to be done before HD today.

## 2017-10-23 NOTE — PROGRESS NOTE ADULT - SUBJECTIVE AND OBJECTIVE BOX
Follow Up:      Inverval History/ROS:Patient is a 76y old  Male who presents with a chief complaint of Left face cellulitis, Mastoiditis, Otitis Externa (18 Oct 2017 01:57)    At baseline per family.      Allergies    No Known Allergies    Intolerances        ANTIMICROBIALS:  ciprofloxacin     Tablet 500 every 24 hours  doxycycline hyclate Capsule 100 every 12 hours      OTHER MEDS:  acetaminophen   Tablet. 650 milliGRAM(s) Oral every 6 hours PRN  aspirin enteric coated 81 milliGRAM(s) Oral daily  atorvastatin 80 milliGRAM(s) Oral at bedtime  ciprofloxacin/dexamethasone Suspension Otic 4 Drop(s) Left Ear four times a day  dextrose 5%. 1000 milliLiter(s) IV Continuous <Continuous>  dextrose 50% Injectable 12.5 Gram(s) IV Push once  dextrose 50% Injectable 25 Gram(s) IV Push once  dextrose 50% Injectable 25 Gram(s) IV Push once  dextrose Gel 1 Dose(s) Oral once PRN  epoetin estelle Injectable 3000 Unit(s) IV Push <User Schedule>  glucagon  Injectable 1 milliGRAM(s) IntraMuscular once PRN  insulin lispro (HumaLOG) corrective regimen sliding scale   SubCutaneous at bedtime  insulin lispro (HumaLOG) corrective regimen sliding scale   SubCutaneous three times a day before meals  lactobacillus acidophilus 1 Tablet(s) Oral every 12 hours  metoprolol 12.5 milliGRAM(s) Oral two times a day  Nephro-emil 1 Tablet(s) Oral daily  NIFEdipine XL 60 milliGRAM(s) Oral every 12 hours  oxyCODONE    IR 5 milliGRAM(s) Oral every 6 hours PRN  pantoprazole    Tablet 40 milliGRAM(s) Oral before breakfast      Vital Signs Last 24 Hrs  T(C): 36.7 (23 Oct 2017 13:52), Max: 37.3 (22 Oct 2017 18:24)  T(F): 98.1 (23 Oct 2017 13:52), Max: 99.1 (22 Oct 2017 18:24)  HR: 75 (23 Oct 2017 13:52) (66 - 78)  BP: 152/58 (23 Oct 2017 13:52) (137/59 - 152/65)  BP(mean): --  RR: 18 (23 Oct 2017 13:52) (18 - 19)  SpO2: 100% (23 Oct 2017 13:52) (98% - 100%)    PHYSICAL EXAM:  General: [x ] non-toxic  HEAD/EYES: [ ] PERRL [ x] white sclera [ ] icterus  ENT:  [ ] normal [x ] supple [ ] thrush [ ] pharyngeal exudate  Cardiovascular:   [ ] murmur [x ]x normal [ ] PPM/AICD  Respiratory:  [x ] clear to ausculation bilaterally  GI:  [ x] soft, non-tender, normal bowel sounds  :  [ ] cordoba [ ] no CVA tenderness   Musculoskeletal:  [ ] no synovitis  Neurologic:  [ ] non-focal exam   Skin:  [ ] no rash  Lymph: [ ] no lymphadenopathy  Psychiatric:  [ ] appropriate affect [ ] alert & oriented  Lines:  [ x] no phlebitis [ ] central line                                10.4   12.10 )-----------( 186      ( 23 Oct 2017 08:00 )             31.9       10-23    142  |  103  |  30<H>  ----------------------------<  108<H>  5.5<H>   |  25  |  8.60<H>    Ca    7.9<L>      23 Oct 2017 06:04  Phos  4.0     10-23  Mg     2.1     10-23    TPro  6.3  /  Alb  3.4  /  TBili  0.5  /  DBili  x   /  AST  16  /  ALT  9   /  AlkPhos  43  10-23          MICROBIOLOGY:    RADIOLOGY:

## 2017-10-23 NOTE — PROGRESS NOTE ADULT - SUBJECTIVE AND OBJECTIVE BOX
CARDIOLOGY FOLLOW UP - Dr. Flaherty    CC no chest pain or sob        PHYSICAL EXAM:  T(C): 36.7 (10-23-17 @ 13:52), Max: 37.3 (10-22-17 @ 18:24)  HR: 75 (10-23-17 @ 13:52) (66 - 78)  BP: 152/58 (10-23-17 @ 13:52) (137/59 - 152/65)  RR: 18 (10-23-17 @ 13:52) (18 - 19)  SpO2: 100% (10-23-17 @ 13:52) (98% - 100%)  Wt(kg): --  I&O's Summary      Appearance: Normal	  Cardiovascular: Normal S1 S2,RRR,+ sys 2/6  murmur  Respiratory: Lungs clear to auscultation	  Gastrointestinal:  Soft, Non-tender, + BS	  Extremities: Normal range of motion, No clubbing, cyanosis or edema        MEDICATIONS  (STANDING):  aspirin enteric coated 81 milliGRAM(s) Oral daily  atorvastatin 80 milliGRAM(s) Oral at bedtime  ciprofloxacin     Tablet 500 milliGRAM(s) Oral every 24 hours  ciprofloxacin/dexamethasone Suspension Otic 4 Drop(s) Left Ear four times a day  dextrose 5%. 1000 milliLiter(s) (50 mL/Hr) IV Continuous <Continuous>  dextrose 50% Injectable 12.5 Gram(s) IV Push once  dextrose 50% Injectable 25 Gram(s) IV Push once  dextrose 50% Injectable 25 Gram(s) IV Push once  doxycycline hyclate Capsule 100 milliGRAM(s) Oral every 12 hours  epoetin estelle Injectable 3000 Unit(s) IV Push <User Schedule>  insulin lispro (HumaLOG) corrective regimen sliding scale   SubCutaneous at bedtime  insulin lispro (HumaLOG) corrective regimen sliding scale   SubCutaneous three times a day before meals  lactobacillus acidophilus 1 Tablet(s) Oral every 12 hours  metoprolol 12.5 milliGRAM(s) Oral two times a day  Nephro-emil 1 Tablet(s) Oral daily  NIFEdipine XL 60 milliGRAM(s) Oral every 12 hours  pantoprazole    Tablet 40 milliGRAM(s) Oral before breakfast      TELEMETRY: 	    ECG:  	  RADIOLOGY:   DIAGNOSTIC TESTING:  [ ] Echocardiogram:  [ ]  Catheterization:  [ ] Stress Test:    OTHER: 	    LABS:	 	                                10.4   12.10 )-----------( 186      ( 23 Oct 2017 08:00 )             31.9     10-23    142  |  103  |  30<H>  ----------------------------<  108<H>  5.5<H>   |  25  |  8.60<H>    Ca    7.9<L>      23 Oct 2017 06:04  Phos  4.0     10-23  Mg     2.1     10-23    TPro  6.3  /  Alb  3.4  /  TBili  0.5  /  DBili  x   /  AST  16  /  ALT  9   /  AlkPhos  43  10-23

## 2017-10-23 NOTE — PROGRESS NOTE ADULT - ATTENDING COMMENTS
Pt with weakness, diffuse, evidence of malignant otitis extterna. recommend MRI brain to exclude intracranial involvement

## 2017-10-23 NOTE — DISCHARGE NOTE ADULT - CARE PROVIDERS DIRECT ADDRESSES
,[email or web address],DirectAddress_Unknown

## 2017-10-23 NOTE — PROGRESS NOTE ADULT - ASSESSMENT
77 yo m, with PMHx of CAD on dual antiplatelet therapy, HTN, ESRD on HD, presented with 3 days of lethargy and weakness (L>R) followed sudden onset of encephalopathy which described as unresponsiveness, during dialysis today as well as worsening left sided weakness. Exam does not show any substantial left sided weakness. CT H is positive for significant white matter disease with progressive tissue swelling over the left frontotemporal region as well as mastoiditis with indistinctness of bony walls with possible necrotizing otitis exrterna.  Impression: generalized weakness and lethargy likely due to systemic toxic metabolic response to an inflammatory process, infection in this case. MRI brain shows new focal infarct in the left parietal white matter without hemorrhagic transformation. DDX - Left parietal infarct, likely small vessel ischemia    Reccs:   CTA Head and neck with contrast prior to dialysis - which will give provide more information regarding stenosis.   Would get a LE doppler because TTE was not done with a bubble study  C/w Aspirin 81mg daily indefinitely  C/w Plavix 75mg daily - duration will depend on CTA head and neck results. 3 weeks vs 3 months.   C/w Atorvastatin 80mg daily  LDL- 93  Hba1c - 5.3  Continue medical and infectious management per Primary team 77 yo m, with PMHx of CAD on dual antiplatelet therapy, HTN, ESRD on HD, presented with 3 days of lethargy and weakness (L>R) followed sudden onset of encephalopathy which described as unresponsiveness, during dialysis today as well as worsening left sided weakness. Exam does not show any substantial left sided weakness. CT H is positive for significant white matter disease with progressive tissue swelling over the left frontotemporal region as well as mastoiditis with indistinctness of bony walls with possible necrotizing otitis exrterna.  Impression: generalized weakness and lethargy likely due to systemic toxic metabolic response to an inflammatory process, infection in this case. MRI brain shows new focal infarct in the left parietal white matter without hemorrhagic transformation. DDX - Left parietal infarct, likely small vessel ischemia    Reccs:   CTA Head and neck with contrast prior to dialysis - which will give more information regarding stenosis (MRA without contrast is not sensitive for assessment of stenosis)  Would get a LE doppler because TTE was not done with a bubble study  C/w Aspirin 81mg daily indefinitely  C/w Plavix 75mg daily - duration will depend on CTA head and neck results. 3 weeks vs 3 months.   C/w Atorvastatin 80mg daily  LDL- 93  Hba1c - 5.3  Continue medical and infectious management per Primary team 75 yo m, with PMHx of CAD on dual antiplatelet therapy, HTN, ESRD on HD, presented with 3 days of lethargy and weakness (L>R) followed sudden onset of encephalopathy which described as unresponsiveness, during dialysis today as well as worsening left sided weakness. Exam does not show any substantial left sided weakness. CT H is positive for significant white matter disease with progressive tissue swelling over the left frontotemporal region as well as mastoiditis with indistinctness of bony walls with possible necrotizing otitis exrterna.  Impression: generalized weakness and lethargy likely due to systemic toxic metabolic response to an inflammatory process, infection in this case. MRI brain shows new focal infarct in the left parietal white matter without hemorrhagic transformation. DDX - Left parietal infarct, likely small vessel ischemia    Reccs:   CTA Head and neck with contrast prior to dialysis - which will give more information regarding stenosis (MRA without contrast is not sensitive for assessment of stenosis, questionable whether severe stenosis is present)  Would get a LE doppler because TTE was not done with a bubble study  C/w Aspirin 81mg daily indefinitely  C/w Plavix 75mg daily - duration will depend on CTA head and neck results. 3 weeks vs 3 months.   C/w Atorvastatin 80mg daily  LDL- 93  Hba1c - 5.3  Continue medical and infectious management per Primary team 75 yo man with PMHx of CAD on dual antiplatelet therapy, HTN, ESRD on HD, presented with 3 days of lethargy and weakness followed by sudden onset of encephalopathy which described as unresponsiveness, during dialysis on 10/22. MRI brain showed an acute stroke  involving left JULI-MCA borderzone distribution and moderate to severe leukoaraiosis. MRA head and neck to my eyes showed mild-to-moderate stenosis of left ICA involving cavernous and supraclinoid segments without any other significant intracranial or extracranial large vessel severe stenosis or occlusion. Transthoracic echocardiogram did not show any obvious structural cardiac source of embolism.    Impression:   Cerebral embolism with cerebral infarction. Acute stroke involving the left JULI-MCA borderzone distribution - likely etiology being embolism from a proximal source like cardiac/paradoxical source of embolism     Reccs:   CTA Head and neck with contrast prior to dialysis - the better quantify  the degree of intracranial stenoses (MRA without contrast is not sensitive for assessment of stenosis, questionable whether severe stenosis is present)  Would get a LE doppler to screen for DVT, being possible paradoxical source of embolism  C/w Aspirin 81mg daily indefinitely  C/w Plavix 75mg daily - duration will depend on CTA head and neck results   C/w Atorvastatin 80mg daily considering possibility of atheroembolic etiology of his stroke  LDL- 93  Hba1c - 5.3  Continue telemetry to evaluate for any occult cardiac arrhythmias being the etiology for cardiac source of embolism  Continue medical and infectious management per Primary team    Above-mentioned plan was discussed with patient and his wife at bedside in detail. All the questions were answered and concerns were addressed. 77 yo man with PMHx of CAD on dual antiplatelet therapy, HTN, ESRD on HD, presented with 3 days of lethargy and weakness followed by sudden onset of encephalopathy which described as unresponsiveness, during dialysis on 10/22. MRI brain showed an acute stroke  involving left JULI-MCA borderzone distribution and moderate to severe leukoaraiosis. MRA head and neck to my eyes showed mild-to-moderate stenosis of left ICA involving cavernous and supraclinoid segments without any other significant intracranial or extracranial large vessel severe stenosis or occlusion. Transthoracic echocardiogram did not show any obvious structural cardiac source of embolism.    Impression:   Cerebral embolism with cerebral infarction. Acute stroke involving the left JULI-MCA borderzone distribution - likely etiology being embolism from a proximal source like cardiac/paradoxical source of embolism     Reccs:   - Pt is refusing IV access for CTA Head and neck with contrast prior to dialysis - this imaging study may then be done as an outpatient to better quantify  the degree of intracranial stenoses (MRA without contrast is not sensitive for assessment of stenosis, questionable whether severe stenosis is present). Start Plavix for 3 weeks with a plan to continue for 3 weeks minimum. CTA to be done as an outpatient in order to assess whether pt requires 3 months vs 3 weeks of plavix.   - LE doppler to screen for DVT, being possible paradoxical source of embolism  - C/w Aspirin 81mg daily indefinitely  - C/w Plavix 75mg daily - duration will depend on CTA head and neck results   - C/w Atorvastatin 80mg daily considering possibility of atheroembolic etiology of his stroke  - LDL- 93  - Hba1c - 5.3  - Continue telemetry to evaluate for any occult cardiac arrhythmias being the etiology for cardiac source of embolism  - Continue medical and infectious management per Primary team    Above-mentioned plan was discussed with patient and his wife at bedside in detail. All the questions were answered and concerns were addressed.

## 2017-10-23 NOTE — PROGRESS NOTE ADULT - PROBLEM SELECTOR PLAN 1
appreciate ID/ ENT input   cont present abx  TTE  asp er ID rec    neuro recommended CTA of head /neck - HD after cta

## 2017-10-23 NOTE — PROGRESS NOTE ADULT - ASSESSMENT
76 year old with ESRD and DM with otitis externa with associated facial cellulitis.    I discussed the case with ENT.  Clinically, there is not a concern for mastoiditis or malignant otitis extermia.    Continue abx trhough 10/27.    Call with questions

## 2017-10-23 NOTE — DISCHARGE NOTE ADULT - HOME CARE AGENCY
[facility]  for RN, physical therapy and social work with start of care 10/28/17 for resumption of care of 12hrs x 7 day [facility]

## 2017-10-23 NOTE — PROGRESS NOTE ADULT - SUBJECTIVE AND OBJECTIVE BOX
[facility] NEPHROLOGY- PROGRESS NOTE    76 year old male h/o ESRD on HD presents with L facial swelling 2/2 cellulitis course c/b new infarcts seen on MRI head. Nephrology consulted for ESRD status.    REVIEW OF SYSTEMS:  Gen: no changes in weight, + left facial swelling improving  Cards: no chest pain  Resp: no dyspnea  GI: no nausea or vomiting or diarrhea  Vascular: no LE edema    No Known Allergies      Hospital Medications: Medications reviewed      VITALS:  T(F): 98.1 (10-23-17 @ 13:52), Max: 99.1 (10-22-17 @ 18:24)  HR: 75 (10-23-17 @ 13:52)  BP: 152/58 (10-23-17 @ 13:52)  RR: 18 (10-23-17 @ 13:52)  SpO2: 100% (10-23-17 @ 13:52)  Wt(kg): --      PHYSICAL EXAM:    Gen: NAD, calm, + left facial swelling resolved  Cards: RRR, +S1/S2, + EH  Resp: CTA B/L  GI: soft, NT/ND, NABS  Vascular: no LE edema B/L, LUE AVF + bruit/thrill      LABS:  10-23    142  |  103  |  30<H>  ----------------------------<  108<H>  5.5<H>   |  25  |  8.60<H>    Ca    7.9<L>      23 Oct 2017 06:04  Phos  4.0     10-23  Mg     2.1     10-23    TPro  6.3  /  Alb  3.4  /  TBili  0.5  /  DBili      /  AST  16  /  ALT  9   /  AlkPhos  43  10-23    Creatinine Trend: 8.60 <--, 7.25 <--, 5.10 <--, 6.88 <--, 4.91 <--, 7.58 <--, 6.63 <--                        10.4   12.10 )-----------( 186      ( 23 Oct 2017 08:00 )             31.9

## 2017-10-23 NOTE — DISCHARGE NOTE ADULT - CARE PROVIDER_API CALL
Rogelio Weiss (MBBS), Neurology; Vascular Neurology  [address] [address]  Phone: [phone]  Fax: [phone] Rogelio Weiss (VALDO), Neurology; Vascular Neurology  [address] [address]  Phone: [phone]  Fax: [phone]    Amadeo Bush), Infectious Disease  [address] [place] [address]  Phone: [phone]  Fax: [phone] Rogelio Weiss (VALDO), Neurology; Vascular Neurology  [address] [address]  Phone: [phone]  Fax: [phone]    Amadeo Bush), Infectious Disease  [address] [place] [address]  Phone: [phone]  Fax: [phone] Rogelio Weiss (VALDO), Neurology; Vascular Neurology  [address] [facility] [address]  [place] [address]  Phone: [phone]  Fax: [phone]    Amadeo Bush), Infectious Disease  [address] [place] [address]  Phone: [phone]  Fax: [phone]    Eduardo Romano (DO), Internal Medicine  [address] [place] [address]  Phone: [phone]  Fax: [phone]

## 2017-10-23 NOTE — PROGRESS NOTE ADULT - PROBLEM SELECTOR PLAN 3
Hb acceptable. Continue with Epo 3K with HD. Will decrease if Hb increases given acute infarcts seen on MRI head. Monitor Hb.

## 2017-10-23 NOTE — PROGRESS NOTE ADULT - SUBJECTIVE AND OBJECTIVE BOX
chief complaint : left face cellulitis        SUBJECTIVE / OVERNIGHT EVENTS: pt appears comfortable, denies chest pain, shortness of breath, nausea, vomiting facial pain , pts family next to pt says that pt i stired to walk around requesting for PT     MEDICATIONS  (STANDING):  aspirin enteric coated 81 milliGRAM(s) Oral daily  atorvastatin 80 milliGRAM(s) Oral at bedtime  ciprofloxacin     Tablet 500 milliGRAM(s) Oral every 24 hours  ciprofloxacin/dexamethasone Suspension Otic 4 Drop(s) Left Ear four times a day  dextrose 5%. 1000 milliLiter(s) (50 mL/Hr) IV Continuous <Continuous>  dextrose 50% Injectable 12.5 Gram(s) IV Push once  dextrose 50% Injectable 25 Gram(s) IV Push once  dextrose 50% Injectable 25 Gram(s) IV Push once  doxycycline hyclate Capsule 100 milliGRAM(s) Oral every 12 hours  epoetin estelle Injectable 3000 Unit(s) IV Push <User Schedule>  insulin lispro (HumaLOG) corrective regimen sliding scale   SubCutaneous at bedtime  insulin lispro (HumaLOG) corrective regimen sliding scale   SubCutaneous three times a day before meals  lactobacillus acidophilus 1 Tablet(s) Oral every 12 hours  metoprolol 12.5 milliGRAM(s) Oral two times a day  Nephro-emil 1 Tablet(s) Oral daily  NIFEdipine XL 60 milliGRAM(s) Oral every 12 hours  pantoprazole    Tablet 40 milliGRAM(s) Oral before breakfast    MEDICATIONS  (PRN):  acetaminophen   Tablet. 650 milliGRAM(s) Oral every 6 hours PRN Mild Pain (1 - 3)  dextrose Gel 1 Dose(s) Oral once PRN Blood Glucose LESS THAN 70 milliGRAM(s)/deciliter  glucagon  Injectable 1 milliGRAM(s) IntraMuscular once PRN Glucose LESS THAN 70 milligrams/deciliter  oxyCODONE    IR 5 milliGRAM(s) Oral every 6 hours PRN moderate to severe pain      Vital Signs Last 24 Hrs  T(C): 37 (23 Oct 2017 20:15), Max: 37 (22 Oct 2017 21:47)  T(F): 98.6 (23 Oct 2017 20:15), Max: 98.6 (22 Oct 2017 21:47)  HR: 79 (23 Oct 2017 20:15) (66 - 79)  BP: 153/73 (23 Oct 2017 20:15) (137/59 - 153/73)  BP(mean): --  RR: 18 (23 Oct 2017 20:15) (18 - 19)  SpO2: 100% (23 Oct 2017 17:09) (98% - 100%)    Constitutional: No fever, fatigue  Skin: No rash.  Eyes: No recent vision problems or eye pain.  ENT: No congestion, ear pain, or sore throat.  Cardiovascular: No chest pain or palpation.  Respiratory: No cough, shortness of breath, congestion, or wheezing.  Gastrointestinal: No abdominal pain, nausea, vomiting, or diarrhea.  Genitourinary: No dysuria.  Musculoskeletal: No joint swelling.  Neurologic: No headache.    PHYSICAL EXAM:  GENERAL: NAD  EYES: EOMI, PERRLA  NECK: Supple, No JVD  dec left face swelling / erythema  CHEST/LUNG:   HEART:  S1 , S2 +  ABDOMEN: soft, bs+  EXTREMITIES:  NO  edema  NEUROLOGY: alert awake     LABS:  10-23    142  |  103  |  30<H>  ----------------------------<  108<H>  5.5<H>   |  25  |  8.60<H>    Ca    7.9<L>      23 Oct 2017 06:04  Phos  4.0     10-23  Mg     2.1     10-23    TPro  6.3  /  Alb  3.4  /  TBili  0.5  /  DBili      /  AST  16  /  ALT  9   /  AlkPhos  43  10-23    Creatinine Trend: 8.60 <--, 7.25 <--, 5.10 <--, 6.88 <--, 4.91 <--, 7.58 <--, 6.63 <--                        10.4   12.10 )-----------( 186      ( 23 Oct 2017 08:00 )             31.9     Urine Studies:  Urinalysis Basic - ( 19 Oct 2017 07:00 )    Color: YELLOW / Appearance: CLEAR / S.010 / pH: 8.5  Gluc: 250 / Ketone: NEGATIVE  / Bili: NEGATIVE / Urobili: NORMAL E.U.   Blood: NEGATIVE / Protein: 300 / Nitrite: NEGATIVE   Leuk Esterase: NEGATIVE / RBC: 0-2 / WBC 0-2   Sq Epi: OCC / Non Sq Epi:  / Bacteria:               LIVER FUNCTIONS - ( 23 Oct 2017 06:04 )  Alb: 3.4 g/dL / Pro: 6.3 g/dL / ALK PHOS: 43 u/L / ALT: 9 u/L / AST: 16 u/L / GGT: x

## 2017-10-23 NOTE — PROVIDER CONTACT NOTE (OTHER) - SITUATION
Patient has CT Angio ordered. Patient's current I/V access is Right EJ. I/V placement on extremity needed for contrast. Patient has CT Angio ordered. Patient's current I/V access is Right EJ. I/V placement on extremity needed for contrast. Patient refusing I/V placement

## 2017-10-23 NOTE — PROGRESS NOTE ADULT - ATTENDING COMMENTS
[facility] NEPHROLOGY  Ihsan Saba M.D.  Eduardo Romano D.O.  Loni Ribeiro M.D.  Maris Ramirez, MSN, ANP-C    Telephone: [phone]  Facsimile: [phone]    [address] [place] [address]

## 2017-10-23 NOTE — DISCHARGE NOTE ADULT - NS AS DC STROKE ED MATERIALS
Prescribed Medications/Need for Followup After Discharge/Stroke Education Booklet/Stroke Warning Signs and Symptoms/Call 911 for Stroke/Risk Factors for Stroke

## 2017-10-23 NOTE — PROGRESS NOTE ADULT - PROBLEM SELECTOR PLAN 1
Last HD on 10/20 tolerated well with 1.8L removed. Plan for maintenance HD today and will keep on MWF schedule for now. Monitor electrolytes.

## 2017-10-23 NOTE — PROGRESS NOTE ADULT - ASSESSMENT
76 year old male h/o ESRD on HD presents with L facial swelling secondary to cellulitis course c/b acute infarcts seen on MRI head. Nephrology consulted for ESRD status.

## 2017-10-23 NOTE — DISCHARGE NOTE ADULT - PLAN OF CARE
Continue Aspirin and Plavix. Follow up with Dr. Wiess for further management. Obtain CTA as outpatient Optimize return of function to baseline Continue Aspirin and Plavix for 3 weeks then just aspirin 81mg by mouth daily  Follow up with Dr. Weiss Neurology Continue with antibiotics cipro and Doxycycline  until 10/27   Continue cipro ear drops until 10/27  Tylenol prn for pain   Follow up with ID HD 3x week M-W-F   Epogen as needed continue Procardia   continue Metoprolol  Follow up with PMD Continue on asa daily   Continue on atorvastatin daily   Continue asa daily >> with otitis externa;   Continue with antibiotics ciprofloxacin and Doxycycline  until 10/27   Continue cipro ear drops until 10/27  Tylenol prn for pain   Follow up with ID HD 3x week -Th-Sa    Epogen as needed continue Procardia   continue Metoprolol,   Follow up with PMD in 1 week Continue on atorvastatin daily   Continue Aspirin daily  Follow up with PMD in 1 week Continue Aspirin and Plavix for 3 weeks then just aspirin 81mg by mouth daily  Follow up with Dr. Weiss Neurology-- 1 week to prevent future episodes HD 3x week Tu-Th-Sa    Epogen as needed, follow up with nephrology within 1 week

## 2017-10-23 NOTE — PROGRESS NOTE ADULT - SUBJECTIVE AND OBJECTIVE BOX
Neurology Progress Note    Interval History - No overnight events. No active or acute complaints.     HPI:  The patient is a 77 yo m, with pmhx of CAD on dual antiplatelet therapy, ESRD on HD, HTN P/W 3 days onset of confusion and lethargy as well as left sided weakness. Today during HD reportedly he was more confused and the wife noticed more pronounced left sided weakness. In ED he is improved back to his baseline. At baseline he A&O x 1 with complete dependency on self care.    MEDICATIONS  (STANDING):  aspirin enteric coated 81 milliGRAM(s) Oral daily  atorvastatin 80 milliGRAM(s) Oral at bedtime  ciprofloxacin     Tablet 500 milliGRAM(s) Oral every 24 hours  ciprofloxacin/dexamethasone Suspension Otic 4 Drop(s) Left Ear four times a day  dextrose 5%. 1000 milliLiter(s) (50 mL/Hr) IV Continuous <Continuous>  dextrose 50% Injectable 12.5 Gram(s) IV Push once  dextrose 50% Injectable 25 Gram(s) IV Push once  dextrose 50% Injectable 25 Gram(s) IV Push once  doxycycline hyclate Capsule 100 milliGRAM(s) Oral every 12 hours  epoetin estelle Injectable 3000 Unit(s) IV Push <User Schedule>  insulin lispro (HumaLOG) corrective regimen sliding scale   SubCutaneous at bedtime  insulin lispro (HumaLOG) corrective regimen sliding scale   SubCutaneous three times a day before meals  lactobacillus acidophilus 1 Tablet(s) Oral every 12 hours  metoprolol 12.5 milliGRAM(s) Oral two times a day  Nephro-emil 1 Tablet(s) Oral daily  NIFEdipine XL 60 milliGRAM(s) Oral every 12 hours  pantoprazole    Tablet 40 milliGRAM(s) Oral before breakfast    MEDICATIONS  (PRN):  acetaminophen   Tablet. 650 milliGRAM(s) Oral every 6 hours PRN Mild Pain (1 - 3)  dextrose Gel 1 Dose(s) Oral once PRN Blood Glucose LESS THAN 70 milliGRAM(s)/deciliter  glucagon  Injectable 1 milliGRAM(s) IntraMuscular once PRN Glucose LESS THAN 70 milligrams/deciliter  oxyCODONE    IR 5 milliGRAM(s) Oral every 6 hours PRN moderate to severe pain    FAMILY HISTORY:  No pertinent family history in first degree relatives    Allergies    No Known Allergies    Intolerances    SHx - No smoking, No ETOH, No drug abuse    Review of Systems:  CONSTITUTIONAL:  No weight loss, fever, chills, weakness or fatigue.  HEENT:  Eyes:  No visual loss, blurred vision, double vision or yellow sclerae. Ears, Nose, Throat:  No hearing loss, sneezing, congestion, runny nose or sore throat.  CARDIOVASCULAR:  No chest pain, chest pressure or chest discomfort. No palpitations or edema.  GASTROINTESTINAL:  No anorexia, nausea, vomiting or diarrhea. No abdominal pain or blood.  NEUROLOGICAL: See HPI  MUSCULOSKELETAL:  No muscle, back pain, joint pain or stiffness.  PSYCHIATRIC:  No history of depression or anxiety.    Vital Signs Last 24 Hrs  T(C): 36.8 (23 Oct 2017 10:44), Max: 37.3 (22 Oct 2017 18:24)  T(F): 98.3 (23 Oct 2017 10:44), Max: 99.1 (22 Oct 2017 18:24)  HR: 73 (23 Oct 2017 10:44) (66 - 78)  BP: 150/58 (23 Oct 2017 10:44) (129/53 - 152/65)  BP(mean): --  RR: 18 (23 Oct 2017 10:44) (18 - 19)  SpO2: 100% (23 Oct 2017 10:44) (98% - 100%)    General Exam:   General appearance: No acute distress                  Neurological Exam:  Mental Status: Alert, oriented to self and place (Knows he is in hospital, does not know which), follows commands, cooperative with the exam  Cranial Nerves: CN I - not tested.  PERRL, EOMI, VFF, no nystagmus or diplopia.  No APD.    Mild facial asymmetry, likely due to swelling.   Motor Right sided UE weakness     Sensation: intact to light touch.  Coordination: right sided FTN ataxia     Other:                        10.4   12.10 )-----------( 186      ( 23 Oct 2017 08:00 )             31.9   10-23    142  |  103  |  30<H>  ----------------------------<  108<H>  5.5<H>   |  25  |  8.60<H>    Ca    7.9<L>      23 Oct 2017 06:04  Phos  4.0     10-23  Mg     2.1     10-23    TPro  6.3  /  Alb  3.4  /  TBili  0.5  /  DBili  x   /  AST  16  /  ALT  9   /  AlkPhos  43  10-23    Radiology:    MRI brain 10/18, MRA head 10/18, MRA neck w/o matt 10/18:  New focal infarct in the the left parietal white matter without    hemorrhagic transformation.    Unchanged enlarged lateral and third ventricles and lower bifrontal   temporal sulci with sulcal effacement at the vertex, this may be seen   with normal pressure hydrocephalus. Hyperintense T2 and FLAIR signal   along the ventricular margins may represent transependymal CSF.    Unchanged volume loss with nonspecific foci of lacunar infarction and   white matter hyperintense T2 and FLAIR signal likely microvascular   disease. No new hemorrhage or midline shift.    Left mastoid mastoid opacification with increased extension toward the   left petrous apex and increased soft tissue swelling of the left external   auditory canal, left frontotemporal parietal scalp toward the vertex, and   inferiorly into the left parotid,  and parapharyngeal spaces   concerning for progressive infectious and/or inflammatory change. In   patient with diabetes, a coalescent mastoiditis and a necrotizing otitis   externa and cellulitis possibly from Pseudomonas infection with other   infectious etiologies is not excluded.    Intracranially there is multifocal severe stenosis of the left cavernous   and super clinoid ICA with a dominant right ICA with narrowing likely   from atherosclerotic vascular opacification. There is a dominant right A1   and hypoplastic left A1 likely anatomic variation.    There is a 3.5 cm stenosis along the proximal subclavian artery with left   subclavian steal phenomena with a left vertebral demonstrating absent   flow in the ascending 2-D time-of-flight MRA and presence of flow in the   descending 2-D time-of-flight MRA.      CT head 10/17  Progressive soft tissue swelling over the left frontotemporoparietal   region with indistinct subgaleal fluid collections. Extensive   opacification of the left mastoid air cells with questionable   indistinctness of the bony walls of several mastoid air cells. Findings   are concerning for necrotizing otitis externa with extension into the   fissures of [place], left parotid and  spaces and associated   coalescent mastoiditis. Evaluation is limited in the absence of   intravenous contrast.    Unchanged enlarged lateral and third ventricles and lower bifrontal   temporal sulci with slight sulcal effacement of the vertex this can be   seen with normal pressure hydrocephalus as well as areas of microvascular   disease. No evidence of intracranial hemorrhage or midline shift.    Multiple air-fluid levels within the sphenoid sinus, underlying sphenoid   sinusitis is not excluded.      US head -   No abscess is identified. Possible inflammatory fat lobulation in the   left buccal region or lymph node. Correlation with contrast-enhanced   cross-sectional imaging may be useful for better characterization of the   swelling.    Carotid duplex (09/12) - No duplex evidence of hemodynamically significant internal carotid artery   stenosis. Neurology Progress Note    Interval History - No overnight events. No active or acute complaints.     HPI:  The patient is a 75 yo m, with pmhx of CAD on dual antiplatelet therapy, ESRD on HD, HTN P/W 3 days onset of confusion and lethargy as well as left sided weakness. Today during HD reportedly he was more confused and the wife noticed more pronounced left sided weakness. In ED he is improved back to his baseline. At baseline he A&O x 1 with complete dependency on self care.    MEDICATIONS  (STANDING):  aspirin enteric coated 81 milliGRAM(s) Oral daily  atorvastatin 80 milliGRAM(s) Oral at bedtime  ciprofloxacin     Tablet 500 milliGRAM(s) Oral every 24 hours  ciprofloxacin/dexamethasone Suspension Otic 4 Drop(s) Left Ear four times a day  dextrose 5%. 1000 milliLiter(s) (50 mL/Hr) IV Continuous <Continuous>  dextrose 50% Injectable 12.5 Gram(s) IV Push once  dextrose 50% Injectable 25 Gram(s) IV Push once  dextrose 50% Injectable 25 Gram(s) IV Push once  doxycycline hyclate Capsule 100 milliGRAM(s) Oral every 12 hours  epoetin estelle Injectable 3000 Unit(s) IV Push <User Schedule>  insulin lispro (HumaLOG) corrective regimen sliding scale   SubCutaneous at bedtime  insulin lispro (HumaLOG) corrective regimen sliding scale   SubCutaneous three times a day before meals  lactobacillus acidophilus 1 Tablet(s) Oral every 12 hours  metoprolol 12.5 milliGRAM(s) Oral two times a day  Nephro-emil 1 Tablet(s) Oral daily  NIFEdipine XL 60 milliGRAM(s) Oral every 12 hours  pantoprazole    Tablet 40 milliGRAM(s) Oral before breakfast    MEDICATIONS  (PRN):  acetaminophen   Tablet. 650 milliGRAM(s) Oral every 6 hours PRN Mild Pain (1 - 3)  dextrose Gel 1 Dose(s) Oral once PRN Blood Glucose LESS THAN 70 milliGRAM(s)/deciliter  glucagon  Injectable 1 milliGRAM(s) IntraMuscular once PRN Glucose LESS THAN 70 milligrams/deciliter  oxyCODONE    IR 5 milliGRAM(s) Oral every 6 hours PRN moderate to severe pain    FAMILY HISTORY: o pertinent family history in first degree relatives  NKDA or intolerances  SHx - No smoking, No ETOH, No drug abuse    Review of Systems:  CONSTITUTIONAL:  No weight loss, fever, chills, weakness or fatigue.  HEENT:  Eyes:  No visual loss, blurred vision, double vision or yellow sclerae. Ears, Nose, Throat:  No hearing loss, sneezing, congestion, runny nose or sore throat.  CARDIOVASCULAR:  No chest pain, chest pressure or chest discomfort. No palpitations or edema.  GASTROINTESTINAL:  No anorexia, nausea, vomiting or diarrhea. No abdominal pain or blood.  NEUROLOGICAL: See HPI  MUSCULOSKELETAL:  No muscle, back pain, joint pain or stiffness.  PSYCHIATRIC:  No history of depression or anxiety.    Vital Signs Last 24 Hrs  T(C): 36.8 (23 Oct 2017 10:44), Max: 37.3 (22 Oct 2017 18:24)  T(F): 98.3 (23 Oct 2017 10:44), Max: 99.1 (22 Oct 2017 18:24)  HR: 73 (23 Oct 2017 10:44) (66 - 78)  BP: 150/58 (23 Oct 2017 10:44) (129/53 - 152/65)  BP(mean): --  RR: 18 (23 Oct 2017 10:44) (18 - 19)  SpO2: 100% (23 Oct 2017 10:44) (98% - 100%)    General Exam:   General appearance: No acute distress                  Neurological Exam:  Mental Status: Alert, oriented to self and place (Knows he is in hospital, does not know which), follows commands, cooperative with the exam  Cranial Nerves: CN I - not tested.  PERRL, EOMI, VFF, no nystagmus or diplopia.  No APD.    Mild facial asymmetry, likely due to swelling.   Motor Right sided UE weakness     Sensation: intact to light touch.  Coordination: right sided FTN ataxia     Labs:                  10.4   12.10 )-----------( 186      ( 23 Oct 2017 08:00 )             31.9   10-23    142  |  103  |  30<H>  ----------------------------<  108<H>  5.5<H>   |  25  |  8.60<H>    Ca    7.9<L>      23 Oct 2017 06:04  Phos  4.0     10-23  Mg     2.1     10-23    TPro  6.3  /  Alb  3.4  /  TBili  0.5  /  DBili  x   /  AST  16  /  ALT  9   /  AlkPhos  43  10-23    Radiology:  MRI brain 10/18, MRA head 10/18, MRA neck w/o matt 10/18:  New focal infarct in the the left parietal white matter without    hemorrhagic transformation.    Unchanged enlarged lateral and third ventricles and lower bifrontal   temporal sulci with sulcal effacement at the vertex, this may be seen   with normal pressure hydrocephalus. Hyperintense T2 and FLAIR signal   along the ventricular margins may represent transependymal CSF.    Unchanged volume loss with nonspecific foci of lacunar infarction and   white matter hyperintense T2 and FLAIR signal likely microvascular   disease. No new hemorrhage or midline shift.    Left mastoid mastoid opacification with increased extension toward the   left petrous apex and increased soft tissue swelling of the left external   auditory canal, left frontotemporal parietal scalp toward the vertex, and   inferiorly into the left parotid,  and parapharyngeal spaces   concerning for progressive infectious and/or inflammatory change. In   patient with diabetes, a coalescent mastoiditis and a necrotizing otitis   externa and cellulitis possibly from Pseudomonas infection with other   infectious etiologies is not excluded.    Intracranially there is multifocal severe stenosis of the left cavernous   and super clinoid ICA with a dominant right ICA with narrowing likely   from atherosclerotic vascular opacification. There is a dominant right A1   and hypoplastic left A1 likely anatomic variation.    There is a 3.5 cm stenosis along the proximal subclavian artery with left   subclavian steal phenomena with a left vertebral demonstrating absent   flow in the ascending 2-D time-of-flight MRA and presence of flow in the   descending 2-D time-of-flight MRA.    CT head 10/17  Progressive soft tissue swelling over the left frontotemporoparietal   region with indistinct subgaleal fluid collections. Extensive   opacification of the left mastoid air cells with questionable   indistinctness of the bony walls of several mastoid air cells. Findings   are concerning for necrotizing otitis externa with extension into the   fissures of [place], left parotid and  spaces and associated   coalescent mastoiditis. Evaluation is limited in the absence of   intravenous contrast.    Unchanged enlarged lateral and third ventricles and lower bifrontal   temporal sulci with slight sulcal effacement of the vertex this can be   seen with normal pressure hydrocephalus as well as areas of microvascular   disease. No evidence of intracranial hemorrhage or midline shift.    Multiple air-fluid levels within the sphenoid sinus, underlying sphenoid   sinusitis is not excluded.    US head   No abscess is identified. Possible inflammatory fat lobulation in the   left buccal region or lymph node. Correlation with contrast-enhanced   cross-sectional imaging may be useful for better characterization of the   swelling.    Carotid duplex (09/12) - No duplex evidence of hemodynamically significant internal carotid artery   stenosis. Neurology Progress Note    Interval History - No overnight events. No active or acute complaints.     HPI:  The patient is a 75 yo m, with pmhx of CAD on dual antiplatelet therapy, ESRD on HD, HTN P/W 3 days onset of confusion and lethargy as well as left sided weakness. Today during HD reportedly he was more confused and the wife noticed more pronounced left sided weakness. In ED he is improved back to his baseline. At baseline he A&O x 1 with complete dependency on self care.    MEDICATIONS  (STANDING):  aspirin enteric coated 81 milliGRAM(s) Oral daily  atorvastatin 80 milliGRAM(s) Oral at bedtime  ciprofloxacin     Tablet 500 milliGRAM(s) Oral every 24 hours  ciprofloxacin/dexamethasone Suspension Otic 4 Drop(s) Left Ear four times a day  dextrose 5%. 1000 milliLiter(s) (50 mL/Hr) IV Continuous <Continuous>  dextrose 50% Injectable 12.5 Gram(s) IV Push once  dextrose 50% Injectable 25 Gram(s) IV Push once  dextrose 50% Injectable 25 Gram(s) IV Push once  doxycycline hyclate Capsule 100 milliGRAM(s) Oral every 12 hours  epoetin estelle Injectable 3000 Unit(s) IV Push <User Schedule>  insulin lispro (HumaLOG) corrective regimen sliding scale   SubCutaneous at bedtime  insulin lispro (HumaLOG) corrective regimen sliding scale   SubCutaneous three times a day before meals  lactobacillus acidophilus 1 Tablet(s) Oral every 12 hours  metoprolol 12.5 milliGRAM(s) Oral two times a day  Nephro-emil 1 Tablet(s) Oral daily  NIFEdipine XL 60 milliGRAM(s) Oral every 12 hours  pantoprazole    Tablet 40 milliGRAM(s) Oral before breakfast    MEDICATIONS  (PRN):  acetaminophen   Tablet. 650 milliGRAM(s) Oral every 6 hours PRN Mild Pain (1 - 3)  dextrose Gel 1 Dose(s) Oral once PRN Blood Glucose LESS THAN 70 milliGRAM(s)/deciliter  glucagon  Injectable 1 milliGRAM(s) IntraMuscular once PRN Glucose LESS THAN 70 milligrams/deciliter  oxyCODONE    IR 5 milliGRAM(s) Oral every 6 hours PRN moderate to severe pain    FAMILY HISTORY: o pertinent family history in first degree relatives  NKDA or intolerances  SHx - No smoking, No ETOH, No drug abuse    Review of Systems:  CONSTITUTIONAL:  No weight loss, fever, chills, weakness or fatigue.  HEENT:  Eyes:  No visual loss, blurred vision, double vision or yellow sclerae. Ears, Nose, Throat:  No hearing loss, sneezing, congestion, runny nose or sore throat.  CARDIOVASCULAR:  No chest pain, chest pressure or chest discomfort. No palpitations or edema.  GASTROINTESTINAL:  No anorexia, nausea, vomiting or diarrhea. No abdominal pain or blood.  NEUROLOGICAL: See HPI  MUSCULOSKELETAL:  No muscle, back pain, joint pain or stiffness.  PSYCHIATRIC:  No history of depression or anxiety.    Vital Signs Last 24 Hrs  T(C): 36.8 (23 Oct 2017 10:44), Max: 37.3 (22 Oct 2017 18:24)  T(F): 98.3 (23 Oct 2017 10:44), Max: 99.1 (22 Oct 2017 18:24)  HR: 73 (23 Oct 2017 10:44) (66 - 78)  BP: 150/58 (23 Oct 2017 10:44) (129/53 - 152/65)  BP(mean): --  RR: 18 (23 Oct 2017 10:44) (18 - 19)  SpO2: 100% (23 Oct 2017 10:44) (98% - 100%)    General Exam:   General appearance: No acute distress                  Neurological Exam:  Mental Status: Alert, oriented to self and place (Knows he is in hospital, does not know which), follows commands, cooperative with the exam  Cranial Nerves: CN I - not tested.  PERRL, EOMI, VFF, no nystagmus or diplopia.  No APD.    Mild facial asymmetry, likely due to swelling.   Motor RUE mild weakness     Sensation: intact to light touch.  Coordination: right sided FTN ataxia - out of proportion to weakness     Labs:                  10.4   12.10 )-----------( 186      ( 23 Oct 2017 08:00 )             31.9   10-23    142  |  103  |  30<H>  ----------------------------<  108<H>  5.5<H>   |  25  |  8.60<H>    Ca    7.9<L>      23 Oct 2017 06:04  Phos  4.0     10-23  Mg     2.1     10-23    TPro  6.3  /  Alb  3.4  /  TBili  0.5  /  DBili  x   /  AST  16  /  ALT  9   /  AlkPhos  43  10-23    Radiology:  MRI brain 10/18, MRA head 10/18, MRA neck w/o matt 10/18:  New focal infarct in the the left parietal white matter without    hemorrhagic transformation.    Unchanged enlarged lateral and third ventricles and lower bifrontal   temporal sulci with sulcal effacement at the vertex, this may be seen   with normal pressure hydrocephalus. Hyperintense T2 and FLAIR signal   along the ventricular margins may represent transependymal CSF.    Unchanged volume loss with nonspecific foci of lacunar infarction and   white matter hyperintense T2 and FLAIR signal likely microvascular   disease. No new hemorrhage or midline shift.    Left mastoid mastoid opacification with increased extension toward the   left petrous apex and increased soft tissue swelling of the left external   auditory canal, left frontotemporal parietal scalp toward the vertex, and   inferiorly into the left parotid,  and parapharyngeal spaces   concerning for progressive infectious and/or inflammatory change. In   patient with diabetes, a coalescent mastoiditis and a necrotizing otitis   externa and cellulitis possibly from Pseudomonas infection with other   infectious etiologies is not excluded.    Intracranially there is multifocal severe stenosis of the left cavernous   and super clinoid ICA with a dominant right ICA with narrowing likely   from atherosclerotic vascular opacification. There is a dominant right A1   and hypoplastic left A1 likely anatomic variation.    There is a 3.5 cm stenosis along the proximal subclavian artery with left   subclavian steal phenomena with a left vertebral demonstrating absent   flow in the ascending 2-D time-of-flight MRA and presence of flow in the   descending 2-D time-of-flight MRA.    CT head 10/17  Progressive soft tissue swelling over the left frontotemporoparietal   region with indistinct subgaleal fluid collections. Extensive   opacification of the left mastoid air cells with questionable   indistinctness of the bony walls of several mastoid air cells. Findings   are concerning for necrotizing otitis externa with extension into the   fissures of [place], left parotid and  spaces and associated   coalescent mastoiditis. Evaluation is limited in the absence of   intravenous contrast.    Unchanged enlarged lateral and third ventricles and lower bifrontal   temporal sulci with slight sulcal effacement of the vertex this can be   seen with normal pressure hydrocephalus as well as areas of microvascular   disease. No evidence of intracranial hemorrhage or midline shift.    Multiple air-fluid levels within the sphenoid sinus, underlying sphenoid   sinusitis is not excluded.    US head   No abscess is identified. Possible inflammatory fat lobulation in the   left buccal region or lymph node. Correlation with contrast-enhanced   cross-sectional imaging may be useful for better characterization of the   swelling.    Carotid duplex (09/12) - No duplex evidence of hemodynamically significant internal carotid artery   stenosis.

## 2017-10-23 NOTE — DISCHARGE NOTE ADULT - HOSPITAL COURSE
DX left facial cellulitis, otitis externa CVA  75 yo male , with PMHx of CAD, Stent on ASA, HTN, ESRD on HD, DM, PVD , high cholesterol, mild dementia, presented with 3 days of lethargy and weakness (L>R) followed sudden onset of encephalopathy which described as unresponsiveness, during dialysis today as well as worsening left sided weakness. Exam does not show any substantial left sided weakness. CT H is positive for significant white matter disease with progressive tissue swelling over the left frontotemporal region as well as mastoiditis with indistinctness of bony walls with possible necrotizing otitis externa , likely severe otitis externa with surrounding cellulitis and underlying  soft tissue spread of infection    + new acute CVA- neuro following, awaiting tele   Facial cellulitis.    - postive Mastoiditis, Left otitis EXTERNA,  - ENT strict glucose control, ciprodex drops 4 drops bid  - ID consult in AM, IV Zosyn, Bacid,   -MRI Neck/Face no contrast, Blood cultures, CBC, CMP f/u   -CT head no Contrast- soft tissue swelling over the left frontotemporoparietal region with indistinct subgaleal fluid collections. Extensive opacification of the left mastoid air cells with questionable indistinctness of the bony walls of several mastoid air cells. Findings   are concerning for necrotizing otitis externa with extension into the   fissures of [place], left parotid and  spaces and associated   coalescent mastoiditis.   -US of head - No abscess. Possible inflammatory fat lobulation in the   left buccal region or lymph node.   -fall/aspiration precaution  Chest  X ray  -sholder xray No fractures, dislocations, or AC separation  neurology follow-asa statin started  - MRI head/neck/orbit 1/ New focal infarct in the the left parietal white matter without  hemorrhagic transformation. 2)Left mastoid mastoid opacification concerning for progressive infectious and/or inflammatory change.  Pseudomonas infection with other infectious etiologies is not excluded. 3) There is a 3.5 cm stenosis along the proximal subclavian artery with left subclavian steal phenomena with a left vertebral demonstrating absent flow in the ascending 2-D time-of-flight MRA and presence of flow in the descending 2-D time-of-flight MRA.  ESRD- renal following  -HD MWF  CT BrainNo evidence of acute intracranial hemorrhage, mass effect, or territorial   infarct. There may be mild superimposed hydrocephalus upon central   cerebral volume loss in the appropriate clinical setting.  CTA neck:  No significant carotid stenosis by NASCET criteria. Narrowing of the left   V4 segment without loss of flow related enhancement.   CTA brain:  No abrupt vessel cut off or aneurysm involving the [place] of [place].   Nonvisualized A1 segment DX left facial cellulitis, otitis externa CVA  75 yo male , with PMHx of CAD, Stent on ASA, HTN, ESRD on HD, DM, PVD , high cholesterol, mild dementia, presented with 3 days of lethargy and weakness (L>R),  followed with unresponsiveness during dialysis with worsening left sided weakness.     Hospital course:   CT H is positive for significant white matter disease with progressive tissue swelling over the left frontotemporal region as well as mastoiditis with indistinctness of bony walls with possible necrotizing otitis externa , likely severe otitis externa with surrounding cellulitis and underlying  soft tissue spread of infection    + new acute CVA-      Facial cellulitis.    - postive Mastoiditis, Left otitis EXTERNA,  -   -CT head no Contrast- soft tissue swelling over the left frontotemporoparietal region with indistinct subgaleal fluid collections. Extensive opacification of the left mastoid air cells with questionable indistinctness of the bony walls of several mastoid air cells. Findings   are concerning for necrotizing otitis externa with extension into the   fissures of [place], left parotid and  spaces and associated   coalescent mastoiditis.   -US of head - No abscess. Possible inflammatory fat lobulation in the   left buccal region or lymph node.   -fall/aspiration precaution  Chest  X ray  -sholder xray No fractures, dislocations, or AC separation  neurology follow-asa statin started  - MRI head/neck/orbit 1/ New focal infarct in the the left parietal white matter without  hemorrhagic transformation. 2)Left mastoid mastoid opacification concerning for progressive infectious and/or inflammatory change.  Pseudomonas infection with other infectious etiologies is not excluded. 3) There is a 3.5 cm stenosis along the proximal subclavian artery with left subclavian steal phenomena with a left vertebral demonstrating absent flow in the ascending 2-D time-of-flight MRA and presence of flow in the descending 2-D time-of-flight MRA.  ESRD- renal following  -HD MWF  CT BrainNo evidence of acute intracranial hemorrhage, mass effect, or territorial   infarct. There may be mild superimposed hydrocephalus upon central   cerebral volume loss in the appropriate clinical setting.  CTA neck:  No significant carotid stenosis by NASCET criteria. Narrowing of the left   V4 segment without loss of flow related enhancement.   CTA brain:  No abrupt vessel cut off or aneurysm involving the [place] of [place].   Nonvisualized A1 segment DX left facial cellulitis, otitis externa CVA  77 yo male , with PMHx of CAD, Stent on ASA, HTN, ESRD on HD, DM, PVD , high cholesterol, mild dementia, presented with 3 days of lethargy and weakness (L>R),  followed with unresponsiveness during dialysis with worsening left sided weakness.     Hospital course:      Problem/Plan - 1:  ·  Problem: acute CVA (cerebral vascular accident).  Plan: appreciate neuro input, cont aspirin, plavix  PT  >  CT head no Contrast- soft tissue swelling over the left frontotemporoparietal region with indistinct subgaleal fluid collections. Extensive opacification of the left mastoid air cells with questionable indistinctness of the bony walls of several mastoid air cells. Findings   are concerning for necrotizing otitis externa with extension into the   fissures of [place], left parotid and  spaces and associated   coalescent mastoiditis.   >  MRI head/neck/orbit 1/ New focal infarct in the the left parietal white matter without  hemorrhagic transformation. 2)Left mastoid mastoid opacification concerning for progressive infectious and/or inflammatory change.  Pseudomonas infection with other infectious etiologies is not excluded. 3) There is a 3.5 cm stenosis along the proximal subclavian artery with left subclavian steal phenomena with a left vertebral demonstrating absent flow in the ascending 2-D time-of-flight MRA and presence of flow in the descending 2-D time-of-flight MRA.  >CT BrainNo evidence of acute intracranial hemorrhage, mass effect, or territorial   infarct. There may be mild superimposed hydrocephalus upon central   cerebral volume loss in the appropriate clinical setting.  CTA neck:  No significant carotid stenosis by NASCET criteria. Narrowing of the left   V4 segment without loss of flow related enhancement.   CTA brain:  No abrupt vessel cut off or aneurysm involving the [place].   Nonvisualized A1 segment    s/p ILR to r/o cardioembolic source.   >> physical therapy at home and outpatient follow up      Problem/Plan - 2:  ·  Problem: Facial cellulitis.  Plan: appreciate ID/ ENT input   completed course of abx.   >-   --US of head - No abscess. Possible inflammatory fat lobulation in the   left buccal region or lymph node.      Problem/Plan - 3:  ·  Problem: End stage renal failure on dialysis.  Plan: HD as scheduled.      Problem/Plan - 4:  ·  Problem: CAD (coronary artery disease).  Plan: on ASA, Plavix, statin,   PAT revealing mod MR, normal LV sys fx, mod to severe LVOT, no PFO   > continue with BB/ CCB.      Problem/Plan - 5:  ·  Problem: HTN (hypertension).  Plan: on Clonidine, Procardia XL, Lopressor, Hold Losartan  for Now.      Problem/Plan - 6:  Problem: HLD (hyperlipidemia). Plan: cont statin.     Problem/Plan - 7:  ·  Problem: DM (diabetes mellitus).  Plan: FS, Sliding scale, Hold Oral Hypoglycemic agents  HgbA1c, Fructosamine, TSH,         Problem/Plan -8:  Problem: Thrombocytopenia. Plan; STABLE.     -shoulder xray No fractures, dislocations, or AC separation    -

## 2017-10-23 NOTE — PROGRESS NOTE ADULT - ASSESSMENT
76 year old male with CAD/PCI, HTN, PVD, ESRD on HD admitted with + CVA / Facial cellulitis    1. cv stable   no chest pain or sob  no evidence of decomp CHF on exam    recent echo revealing normal LV sys fx, mild MR/ AR , EF 65-70 %    2. CAD/PCI  stable   continue with ASA/ BB    3. Facial cellulitis  MRA/MRI/ CT head results noted   ENT f.u  + Mastoiditis, Left otitis externa  PO abx   ID f./u     4. HTN   bp acceptable   cont BB, procardia    5. ESRD on hd  renal f.u     6. Neuro F/u   MRA/MRI head revealing new focal infarct   continue with ASA /statin   pending PAT r/o embolic source   pending CTA head/ neck     7. thrombocytopenia  med f/u    dvt ppx

## 2017-10-23 NOTE — PROGRESS NOTE ADULT - ATTENDING COMMENTS
Patient seen and examined, agree with the above assessment and plan by LUANA Perez.  CV status stable  con current meds

## 2017-10-23 NOTE — DISCHARGE NOTE ADULT - MEDICATION SUMMARY - MEDICATIONS TO TAKE
I will START or STAY ON the medications listed below when I get home from the hospital:    aspirin 81 mg oral delayed release tablet  -- 1 tab(s) by mouth once a day  -- Indication: For CVA (cerebral vascular accident)    acetaminophen 325 mg oral tablet  -- 2 tab(s) by mouth every 6 hours, As needed, For Temp greater than 38.5 C (101.3 F)  -- Indication: For As  needed for temperature    glimepiride 2 mg oral tablet  -- 1 tab(s) by mouth once a day  -- Indication: For DM (diabetes mellitus)    atorvastatin 80 mg oral tablet  -- 1 tab(s) by mouth once a day (at bedtime)  -- Indication: For CVA (cerebral vascular accident), coronary artery disease     clopidogrel 75 mg oral tablet  -- 1 tab(s) by mouth once a day  -- Indication: For CVA (cerebral vascular accident), coronary artery disease     metoprolol tartrate 25 mg oral tablet  -- 1/2 tab(s) by mouth 2 times a day  -- Indication: For Hypertension, coronary artery disease    NIFEdipine 60 mg oral tablet, extended release  -- 1 tab(s) by mouth every 12 hours  -- Indication: For HTN (hypertension)    lactobacillus acidophilus oral capsule  -- 1 tab(s) by mouth once a day  -- Indication: For normal GI danny    pantoprazole 40 mg oral delayed release tablet  -- 1 tab(s) by mouth once a day (before a meal)  -- Indication: For Peptic ulcer prophylaxis     Multiple Vitamins oral tablet  -- 1 tab(s) by mouth once a day  -- Indication: For vitamins

## 2017-10-23 NOTE — DISCHARGE NOTE ADULT - NSFTFSERV1RD_GEN_ALL_CORE
observation and assessment/teaching and training teaching and training/other:/observation and assessment

## 2017-10-23 NOTE — DISCHARGE NOTE ADULT - ADDITIONAL INSTRUCTIONS
Follow up with ID   Follow up with Neurology   Continue dialysis 3 x week Follow up with ID -- 1 week   Follow up with Neurology-- 1 week   Continue dialysis 3 x week

## 2017-10-23 NOTE — DISCHARGE NOTE ADULT - SECONDARY DIAGNOSIS.
Facial cellulitis End stage renal failure on dialysis Benign hypertension with ESRD (end-stage renal disease) S/P coronary artery stent placement

## 2017-10-24 LAB
BUN SERPL-MCNC: 14 MG/DL — SIGNIFICANT CHANGE UP (ref 7–23)
CALCIUM SERPL-MCNC: 8.3 MG/DL — LOW (ref 8.4–10.5)
CHLORIDE SERPL-SCNC: 99 MMOL/L — SIGNIFICANT CHANGE UP (ref 98–107)
CO2 SERPL-SCNC: 29 MMOL/L — SIGNIFICANT CHANGE UP (ref 22–31)
CREAT SERPL-MCNC: 4.79 MG/DL — HIGH (ref 0.5–1.3)
GLUCOSE BLDC GLUCOMTR-MCNC: 114 MG/DL — HIGH (ref 70–99)
GLUCOSE BLDC GLUCOMTR-MCNC: 119 MG/DL — HIGH (ref 70–99)
GLUCOSE BLDC GLUCOMTR-MCNC: 144 MG/DL — HIGH (ref 70–99)
GLUCOSE BLDC GLUCOMTR-MCNC: 297 MG/DL — HIGH (ref 70–99)
GLUCOSE SERPL-MCNC: 87 MG/DL — SIGNIFICANT CHANGE UP (ref 70–99)
HCT VFR BLD CALC: 30.4 % — LOW (ref 39–50)
HGB BLD-MCNC: 10.2 G/DL — LOW (ref 13–17)
MCHC RBC-ENTMCNC: 32.1 PG — SIGNIFICANT CHANGE UP (ref 27–34)
MCHC RBC-ENTMCNC: 33.6 % — SIGNIFICANT CHANGE UP (ref 32–36)
MCV RBC AUTO: 95.6 FL — SIGNIFICANT CHANGE UP (ref 80–100)
NRBC # FLD: 0 — SIGNIFICANT CHANGE UP
PLATELET # BLD AUTO: 156 K/UL — SIGNIFICANT CHANGE UP (ref 150–400)
PMV BLD: 10 FL — SIGNIFICANT CHANGE UP (ref 7–13)
POTASSIUM SERPL-MCNC: 4.4 MMOL/L — SIGNIFICANT CHANGE UP (ref 3.5–5.3)
POTASSIUM SERPL-SCNC: 4.4 MMOL/L — SIGNIFICANT CHANGE UP (ref 3.5–5.3)
RBC # BLD: 3.18 M/UL — LOW (ref 4.2–5.8)
RBC # FLD: 14.2 % — SIGNIFICANT CHANGE UP (ref 10.3–14.5)
SODIUM SERPL-SCNC: 143 MMOL/L — SIGNIFICANT CHANGE UP (ref 135–145)
WBC # BLD: 7.51 K/UL — SIGNIFICANT CHANGE UP (ref 3.8–10.5)
WBC # FLD AUTO: 7.51 K/UL — SIGNIFICANT CHANGE UP (ref 3.8–10.5)

## 2017-10-24 PROCEDURE — 70498 CT ANGIOGRAPHY NECK: CPT | Mod: 26,52

## 2017-10-24 PROCEDURE — 70496 CT ANGIOGRAPHY HEAD: CPT | Mod: 26

## 2017-10-24 RX ORDER — CLOPIDOGREL BISULFATE 75 MG/1
75 TABLET, FILM COATED ORAL DAILY
Qty: 0 | Refills: 0 | Status: DISCONTINUED | OUTPATIENT
Start: 2017-10-24 | End: 2017-10-27

## 2017-10-24 RX ADMIN — Medication 12.5 MILLIGRAM(S): at 17:25

## 2017-10-24 RX ADMIN — CIPROFLOXACIN AND DEXAMETHASONE 4 DROP(S): 3; 1 SUSPENSION/ DROPS AURICULAR (OTIC) at 05:52

## 2017-10-24 RX ADMIN — CIPROFLOXACIN AND DEXAMETHASONE 4 DROP(S): 3; 1 SUSPENSION/ DROPS AURICULAR (OTIC) at 21:54

## 2017-10-24 RX ADMIN — Medication 81 MILLIGRAM(S): at 13:28

## 2017-10-24 RX ADMIN — Medication 60 MILLIGRAM(S): at 06:48

## 2017-10-24 RX ADMIN — ATORVASTATIN CALCIUM 80 MILLIGRAM(S): 80 TABLET, FILM COATED ORAL at 21:53

## 2017-10-24 RX ADMIN — CLOPIDOGREL BISULFATE 75 MILLIGRAM(S): 75 TABLET, FILM COATED ORAL at 17:25

## 2017-10-24 RX ADMIN — Medication 1 TABLET(S): at 19:01

## 2017-10-24 RX ADMIN — Medication 500 MILLIGRAM(S): at 19:01

## 2017-10-24 RX ADMIN — CIPROFLOXACIN AND DEXAMETHASONE 4 DROP(S): 3; 1 SUSPENSION/ DROPS AURICULAR (OTIC) at 13:28

## 2017-10-24 RX ADMIN — Medication 12.5 MILLIGRAM(S): at 06:48

## 2017-10-24 RX ADMIN — Medication 1 TABLET(S): at 17:25

## 2017-10-24 RX ADMIN — Medication 1: at 21:53

## 2017-10-24 RX ADMIN — Medication 60 MILLIGRAM(S): at 19:01

## 2017-10-24 RX ADMIN — Medication 100 MILLIGRAM(S): at 17:25

## 2017-10-24 NOTE — PROGRESS NOTE ADULT - ATTENDING COMMENTS
[facility] NEPHROLOGY  Ihsan Saba M.D.  Eduardo Romano D.O.  Loni Ribeiro M.D.  Maris Ramirez, MSN, ANP-C    Telephone: [phone]  Facsimile: [phone]    [address] [place] [address]

## 2017-10-24 NOTE — PROGRESS NOTE ADULT - SUBJECTIVE AND OBJECTIVE BOX
CARDIOLOGY FOLLOW UP - Dr. Flaherty    CC no acute events        PHYSICAL EXAM:  T(C): 36.6 (10-24-17 @ 10:33), Max: 37.1 (10-24-17 @ 05:51)  HR: 80 (10-24-17 @ 10:33) (75 - 90)  BP: 139/68 (10-24-17 @ 10:33) (139/68 - 153/73)  RR: 18 (10-24-17 @ 10:33) (18 - 18)  SpO2: 100% (10-24-17 @ 10:33) (100% - 100%)  Wt(kg): --  I&O's Summary    23 Oct 2017 07:01  -  24 Oct 2017 07:00  --------------------------------------------------------  IN: 500 mL / OUT: 1800 mL / NET: -1300 mL        Appearance: Normal	  Cardiovascular: Normal S1 S2,RRR  Respiratory: Lungs clear to auscultation/ decrease BS at base 	  Gastrointestinal:  Soft, Non-tender, + BS	  Extremities: Normal range of motion, mild facial edema        MEDICATIONS  (STANDING):  aspirin enteric coated 81 milliGRAM(s) Oral daily  atorvastatin 80 milliGRAM(s) Oral at bedtime  ciprofloxacin     Tablet 500 milliGRAM(s) Oral every 24 hours  ciprofloxacin/dexamethasone Suspension Otic 4 Drop(s) Left Ear four times a day  dextrose 5%. 1000 milliLiter(s) (50 mL/Hr) IV Continuous <Continuous>  dextrose 50% Injectable 12.5 Gram(s) IV Push once  dextrose 50% Injectable 25 Gram(s) IV Push once  dextrose 50% Injectable 25 Gram(s) IV Push once  doxycycline hyclate Capsule 100 milliGRAM(s) Oral every 12 hours  epoetin estelle Injectable 3000 Unit(s) IV Push <User Schedule>  insulin lispro (HumaLOG) corrective regimen sliding scale   SubCutaneous at bedtime  insulin lispro (HumaLOG) corrective regimen sliding scale   SubCutaneous three times a day before meals  lactobacillus acidophilus 1 Tablet(s) Oral every 12 hours  metoprolol 12.5 milliGRAM(s) Oral two times a day  Nephro-emil 1 Tablet(s) Oral daily  NIFEdipine XL 60 milliGRAM(s) Oral every 12 hours  pantoprazole    Tablet 40 milliGRAM(s) Oral before breakfast      TELEMETRY: 	    ECG:  	  RADIOLOGY:   DIAGNOSTIC TESTING:  [ ] Echocardiogram:  [ ]  Catheterization:  [ ] Stress Test:    OTHER: 	    LABS:	 	                                10.2   7.51  )-----------( 156      ( 24 Oct 2017 05:59 )             30.4     10-24    143  |  99  |  14  ----------------------------<  87  4.4   |  29  |  4.79<H>    Ca    8.3<L>      24 Oct 2017 05:59  Phos  4.0     10-23  Mg     2.1     10-23    TPro  6.3  /  Alb  3.4  /  TBili  0.5  /  DBili  x   /  AST  16  /  ALT  9   /  AlkPhos  43  10-23

## 2017-10-24 NOTE — PROGRESS NOTE ADULT - PROBLEM SELECTOR PLAN 1
Last HD on 10/23 with intradialytic hypotension and 1.3L removed. Plan for maintenance HD on 10/25 and will keep on MWF schedule for now. No need to coordinate CT-A with HD. Monitor electrolytes.

## 2017-10-24 NOTE — PROGRESS NOTE ADULT - SUBJECTIVE AND OBJECTIVE BOX
chief complaint : left face cellulitis        SUBJECTIVE / OVERNIGHT EVENTS: pt appears comfortable, denies chest pain, shortness of breath, nausea, vomiting facial pain , pts family next to pt says that pt i stired to walk around requesting for PT     MEDICATIONS  (STANDING):  aspirin enteric coated 81 milliGRAM(s) Oral daily  atorvastatin 80 milliGRAM(s) Oral at bedtime  ciprofloxacin     Tablet 500 milliGRAM(s) Oral every 24 hours  ciprofloxacin/dexamethasone Suspension Otic 4 Drop(s) Left Ear four times a day  clopidogrel Tablet 75 milliGRAM(s) Oral daily  dextrose 5%. 1000 milliLiter(s) (50 mL/Hr) IV Continuous <Continuous>  dextrose 50% Injectable 12.5 Gram(s) IV Push once  dextrose 50% Injectable 25 Gram(s) IV Push once  dextrose 50% Injectable 25 Gram(s) IV Push once  doxycycline hyclate Capsule 100 milliGRAM(s) Oral every 12 hours  epoetin estelle Injectable 3000 Unit(s) IV Push <User Schedule>  insulin lispro (HumaLOG) corrective regimen sliding scale   SubCutaneous at bedtime  insulin lispro (HumaLOG) corrective regimen sliding scale   SubCutaneous three times a day before meals  lactobacillus acidophilus 1 Tablet(s) Oral every 12 hours  metoprolol 12.5 milliGRAM(s) Oral two times a day  Nephro-emil 1 Tablet(s) Oral daily  NIFEdipine XL 60 milliGRAM(s) Oral every 12 hours  pantoprazole    Tablet 40 milliGRAM(s) Oral before breakfast    MEDICATIONS  (PRN):  acetaminophen   Tablet. 650 milliGRAM(s) Oral every 6 hours PRN Mild Pain (1 - 3)  dextrose Gel 1 Dose(s) Oral once PRN Blood Glucose LESS THAN 70 milliGRAM(s)/deciliter  glucagon  Injectable 1 milliGRAM(s) IntraMuscular once PRN Glucose LESS THAN 70 milligrams/deciliter  oxyCODONE    IR 5 milliGRAM(s) Oral every 6 hours PRN moderate to severe pain      Vital Signs Last 24 Hrs  T(C): 36.7 (24 Oct 2017 21:45), Max: 37.1 (24 Oct 2017 05:51)  T(F): 98.1 (24 Oct 2017 21:45), Max: 98.8 (24 Oct 2017 05:51)  HR: 79 (24 Oct 2017 21:45) (79 - 90)  BP: 141/72 (24 Oct 2017 21:45) (139/68 - 149/67)  BP(mean): --  RR: 18 (24 Oct 2017 21:45) (18 - 18)  SpO2: 100% (24 Oct 2017 21:45) (100% - 100%)    Constitutional: No fever, fatigue  Skin: No rash.  Eyes: No recent vision problems or eye pain.  ENT: No congestion, ear pain, or sore throat.  Cardiovascular: No chest pain or palpation.  Respiratory: No cough, shortness of breath, congestion, or wheezing.  Gastrointestinal: No abdominal pain, nausea, vomiting, or diarrhea.  Genitourinary: No dysuria.  Musculoskeletal: No joint swelling.  Neurologic: No headache.    PHYSICAL EXAM:  GENERAL: NAD  EYES: EOMI, PERRLA  NECK: Supple, No JVD  dec left face swelling / erythema  CHEST/LUNG:   HEART:  S1 , S2 +  ABDOMEN: soft, bs+  EXTREMITIES:  NO  edema  NEUROLOGY: alert awake       LABS:  10-24    143  |  99  |  14  ----------------------------<  87  4.4   |  29  |  4.79<H>    Ca    8.3<L>      24 Oct 2017 05:59  Phos  4.0     10-23  Mg     2.1     10-    TPro  6.3  /  Alb  3.4  /  TBili  0.5  /  DBili      /  AST  16  /  ALT  9   /  AlkPhos  43  10-23    Creatinine Trend: 4.79 <--, 8.60 <--, 7.25 <--, 5.10 <--, 6.88 <--, 4.91 <--, 7.58 <--                        10.2   7.51  )-----------( 156      ( 24 Oct 2017 05:59 )             30.4     Urine Studies:  Urinalysis Basic - ( 19 Oct 2017 07:00 )    Color: YELLOW / Appearance: CLEAR / S.010 / pH: 8.5  Gluc: 250 / Ketone: NEGATIVE  / Bili: NEGATIVE / Urobili: NORMAL E.U.   Blood: NEGATIVE / Protein: 300 / Nitrite: NEGATIVE   Leuk Esterase: NEGATIVE / RBC: 0-2 / WBC 0-2   Sq Epi: OCC / Non Sq Epi:  / Bacteria:               LIVER FUNCTIONS - ( 23 Oct 2017 06:04 )  Alb: 3.4 g/dL / Pro: 6.3 g/dL / ALK PHOS: 43 u/L / ALT: 9 u/L / AST: 16 u/L / GGT: x

## 2017-10-24 NOTE — PROGRESS NOTE ADULT - SUBJECTIVE AND OBJECTIVE BOX
[facility] NEPHROLOGY- PROGRESS NOTE    76 year old male h/o ESRD on HD presents with L facial swelling 2/2 cellulitis course c/b new infarcts seen on MRI head. Nephrology consulted for ESRD status.    REVIEW OF SYSTEMS:  Gen: no changes in weight, + left facial swelling improving  Cards: no chest pain  Resp: no dyspnea  GI: no nausea or vomiting or diarrhea  Vascular: no LE edema    No Known Allergies      Hospital Medications: Medications reviewed      VITALS:  T(F): 97.9 (10-24-17 @ 10:33), Max: 98.8 (10-24-17 @ 05:51)  HR: 80 (10-24-17 @ 10:33)  BP: 139/68 (10-24-17 @ 10:33)  RR: 18 (10-24-17 @ 10:33)  SpO2: 100% (10-24-17 @ 10:33)  Wt(kg): --    10-23 @ 07:01  -  10-24 @ 07:00  --------------------------------------------------------  IN: 500 mL / OUT: 1800 mL / NET: -1300 mL      PHYSICAL EXAM:    Gen: NAD, calm, + left facial swelling resolved  Cards: RRR, +S1/S2, + EH  Resp: CTA B/L  GI: soft, NT/ND, NABS  Vascular: no LE edema B/L, LUE AVF + bruit/thrill      LABS:  10-24    143  |  99  |  14  ----------------------------<  87  4.4   |  29  |  4.79<H>    Ca    8.3<L>      24 Oct 2017 05:59  Phos  4.0     10-23  Mg     2.1     10-23    TPro  6.3  /  Alb  3.4  /  TBili  0.5  /  DBili      /  AST  16  /  ALT  9   /  AlkPhos  43  10-23    Creatinine Trend: 4.79 <--, 8.60 <--, 7.25 <--, 5.10 <--, 6.88 <--, 4.91 <--, 7.58 <--, 6.63 <--                        10.2   7.51  )-----------( 156      ( 24 Oct 2017 05:59 )             30.4

## 2017-10-25 LAB
BUN SERPL-MCNC: 25 MG/DL — HIGH (ref 7–23)
CALCIUM SERPL-MCNC: 7.7 MG/DL — LOW (ref 8.4–10.5)
CHLORIDE SERPL-SCNC: 97 MMOL/L — LOW (ref 98–107)
CO2 SERPL-SCNC: 28 MMOL/L — SIGNIFICANT CHANGE UP (ref 22–31)
CREAT SERPL-MCNC: 6.88 MG/DL — HIGH (ref 0.5–1.3)
GLUCOSE BLDC GLUCOMTR-MCNC: 114 MG/DL — HIGH (ref 70–99)
GLUCOSE BLDC GLUCOMTR-MCNC: 123 MG/DL — HIGH (ref 70–99)
GLUCOSE BLDC GLUCOMTR-MCNC: 157 MG/DL — HIGH (ref 70–99)
GLUCOSE BLDC GLUCOMTR-MCNC: 165 MG/DL — HIGH (ref 70–99)
GLUCOSE SERPL-MCNC: 99 MG/DL — SIGNIFICANT CHANGE UP (ref 70–99)
HCT VFR BLD CALC: 31.2 % — LOW (ref 39–50)
HGB BLD-MCNC: 10 G/DL — LOW (ref 13–17)
MAGNESIUM SERPL-MCNC: 2.2 MG/DL — SIGNIFICANT CHANGE UP (ref 1.6–2.6)
MCHC RBC-ENTMCNC: 31.3 PG — SIGNIFICANT CHANGE UP (ref 27–34)
MCHC RBC-ENTMCNC: 32.1 % — SIGNIFICANT CHANGE UP (ref 32–36)
MCV RBC AUTO: 97.8 FL — SIGNIFICANT CHANGE UP (ref 80–100)
NRBC # FLD: 0 — SIGNIFICANT CHANGE UP
PHOSPHATE SERPL-MCNC: 4.6 MG/DL — HIGH (ref 2.5–4.5)
PLATELET # BLD AUTO: 196 K/UL — SIGNIFICANT CHANGE UP (ref 150–400)
PMV BLD: 10 FL — SIGNIFICANT CHANGE UP (ref 7–13)
POTASSIUM SERPL-MCNC: 5.1 MMOL/L — SIGNIFICANT CHANGE UP (ref 3.5–5.3)
POTASSIUM SERPL-SCNC: 5.1 MMOL/L — SIGNIFICANT CHANGE UP (ref 3.5–5.3)
RBC # BLD: 3.19 M/UL — LOW (ref 4.2–5.8)
RBC # FLD: 14.3 % — SIGNIFICANT CHANGE UP (ref 10.3–14.5)
SODIUM SERPL-SCNC: 137 MMOL/L — SIGNIFICANT CHANGE UP (ref 135–145)
WBC # BLD: 7.47 K/UL — SIGNIFICANT CHANGE UP (ref 3.8–10.5)
WBC # FLD AUTO: 7.47 K/UL — SIGNIFICANT CHANGE UP (ref 3.8–10.5)

## 2017-10-25 PROCEDURE — 93306 TTE W/DOPPLER COMPLETE: CPT | Mod: 26

## 2017-10-25 PROCEDURE — 93312 ECHO TRANSESOPHAGEAL: CPT | Mod: 26

## 2017-10-25 RX ORDER — ERYTHROPOIETIN 10000 [IU]/ML
3000 INJECTION, SOLUTION INTRAVENOUS; SUBCUTANEOUS
Qty: 0 | Refills: 0 | Status: DISCONTINUED | OUTPATIENT
Start: 2017-10-25 | End: 2017-10-27

## 2017-10-25 RX ORDER — HEPARIN SODIUM 5000 [USP'U]/ML
5000 INJECTION INTRAVENOUS; SUBCUTANEOUS EVERY 8 HOURS
Qty: 0 | Refills: 0 | Status: DISCONTINUED | OUTPATIENT
Start: 2017-10-25 | End: 2017-10-27

## 2017-10-25 RX ADMIN — Medication 60 MILLIGRAM(S): at 18:47

## 2017-10-25 RX ADMIN — CIPROFLOXACIN AND DEXAMETHASONE 4 DROP(S): 3; 1 SUSPENSION/ DROPS AURICULAR (OTIC) at 18:44

## 2017-10-25 RX ADMIN — PANTOPRAZOLE SODIUM 40 MILLIGRAM(S): 20 TABLET, DELAYED RELEASE ORAL at 06:21

## 2017-10-25 RX ADMIN — Medication 60 MILLIGRAM(S): at 06:21

## 2017-10-25 RX ADMIN — Medication 12.5 MILLIGRAM(S): at 06:21

## 2017-10-25 RX ADMIN — CIPROFLOXACIN AND DEXAMETHASONE 4 DROP(S): 3; 1 SUSPENSION/ DROPS AURICULAR (OTIC) at 12:29

## 2017-10-25 RX ADMIN — HEPARIN SODIUM 5000 UNIT(S): 5000 INJECTION INTRAVENOUS; SUBCUTANEOUS at 22:30

## 2017-10-25 RX ADMIN — Medication 100 MILLIGRAM(S): at 18:45

## 2017-10-25 RX ADMIN — Medication 500 MILLIGRAM(S): at 18:44

## 2017-10-25 RX ADMIN — Medication 100 MILLIGRAM(S): at 06:21

## 2017-10-25 RX ADMIN — Medication 1 TABLET(S): at 12:29

## 2017-10-25 RX ADMIN — Medication 81 MILLIGRAM(S): at 12:28

## 2017-10-25 RX ADMIN — Medication 1: at 12:28

## 2017-10-25 RX ADMIN — Medication 1 TABLET(S): at 18:44

## 2017-10-25 RX ADMIN — Medication 12.5 MILLIGRAM(S): at 18:45

## 2017-10-25 RX ADMIN — CIPROFLOXACIN AND DEXAMETHASONE 4 DROP(S): 3; 1 SUSPENSION/ DROPS AURICULAR (OTIC) at 06:22

## 2017-10-25 RX ADMIN — ATORVASTATIN CALCIUM 80 MILLIGRAM(S): 80 TABLET, FILM COATED ORAL at 22:30

## 2017-10-25 RX ADMIN — CLOPIDOGREL BISULFATE 75 MILLIGRAM(S): 75 TABLET, FILM COATED ORAL at 12:28

## 2017-10-25 RX ADMIN — Medication 1 TABLET(S): at 06:21

## 2017-10-25 NOTE — PROGRESS NOTE ADULT - SUBJECTIVE AND OBJECTIVE BOX
CC: no acute events      PHYSICAL EXAM:    T(C): 37.3 (10-25-17 @ 13:36), Max: 37.3 (10-25-17 @ 13:36)  HR: 76 (10-25-17 @ 13:36) (71 - 83)  BP: 130/60 (10-25-17 @ 13:36) (120/64 - 144/70)  RR: 18 (10-25-17 @ 13:36) (18 - 18)  SpO2: 100% (10-25-17 @ 13:36) (99% - 100%)  Wt(kg): --  I&O's Summary    24 Oct 2017 07:01  -  25 Oct 2017 07:00  --------------------------------------------------------  IN: 0 mL / OUT: 0 mL / NET: 0 mL        Appearance: Normal	  Cardiovascular: Normal S1 S2,RRR, No JVD, No murmurs  Respiratory: Lungs clear to auscultation	  Gastrointestinal:  Soft, Non-tender, + BS	  Extremities: Normal range of motion, No clubbing, cyanosis or edema  Vascular: Peripheral pulses palpable 2+ bilaterally     LABS:	 	                          10.0   7.47  )-----------( 196      ( 25 Oct 2017 06:40 )             31.2     10-25    137  |  97<L>  |  25<H>  ----------------------------<  99  5.1   |  28  |  6.88<H>    Ca    7.7<L>      25 Oct 2017 06:40  Phos  4.6     10-25  Mg     2.2     10-25            CARDIAC MARKERS:

## 2017-10-25 NOTE — CHART NOTE - NSCHARTNOTEFT_GEN_A_CORE
No neurological contraindication for PAT with Bubble study.    Above discussed with Attending, Dr. Weiss.      MD Lenin  - PGY2 Neurology Resident No neurological contraindication for PAT with Bubble study.  DVT prophylaxis    Above discussed with Attending, Dr. Weiss.      MD Lenin  - PGY2 Neurology Resident

## 2017-10-25 NOTE — PROGRESS NOTE ADULT - ASSESSMENT
76 year old male with CAD/PCI, HTN, PVD, ESRD on HD admitted with + CVA / Facial cellulitis    1. cv stable   no chest pain or sob  no evidence of decomp CHF on exam    recent echo revealing normal LV sys fx, mild MR/ AR , EF 65-70 %    2. CAD/PCI  stable   continue with ASA/ BB    3. Facial cellulitis  MRA/MRI/ CT head results noted   ENT f.u  + Mastoiditis, Left otitis externa  PO abx   ID f./u     4. HTN   bp acceptable   cont BB, procardia    5. ESRD on hd  renal f.u     6. Neuro F/u   MRA/MRI head revealing new focal infarct   continue with ASA /statin   pending PAT today  r/o embolic source   pending CTA head/ neck     7. thrombocytopenia  med f/u    dvt ppx

## 2017-10-25 NOTE — PROGRESS NOTE ADULT - ATTENDING COMMENTS
[facility] NEPHROLOGY  Ihsan Saba M.D.  Eduardo Romano D.O.  Loni Ribeiro M.D.  Maris Ramirez, MSN, ANP-C    Telephone: [phone]  Facsimile: [phone]    [address] [place] [address]

## 2017-10-25 NOTE — PROGRESS NOTE ADULT - PROBLEM SELECTOR PLAN 1
Last HD on 10/23 with intradialytic hypotension and 1.3L removed. Plan for maintenance HD today and will keep on MWF schedule for now. Monitor electrolytes.

## 2017-10-25 NOTE — PROGRESS NOTE ADULT - SUBJECTIVE AND OBJECTIVE BOX
[facility] NEPHROLOGY- PROGRESS NOTE    76 year old male h/o ESRD on HD presents with L facial swelling 2/2 cellulitis course c/b new infarcts seen on MRI head. Nephrology consulted for ESRD status.    REVIEW OF SYSTEMS:  Gen: no changes in weight, + left facial swelling resolved  Cards: no chest pain  Resp: no dyspnea  GI: no nausea or vomiting or diarrhea  Vascular: no LE edema    No Known Allergies      Hospital Medications: Medications reviewed      VITALS:  T(F): 99 (10-25-17 @ 10:28), Max: 99 (10-25-17 @ 10:28)  HR: 75 (10-25-17 @ 10:28)  BP: 136/59 (10-25-17 @ 10:28)  RR: 18 (10-25-17 @ 10:28)  SpO2: 100% (10-25-17 @ 10:28)  Wt(kg): --      PHYSICAL EXAM:    Gen: NAD, calm, + left facial swelling resolved  Cards: RRR, +S1/S2, + EH  Resp: CTA B/L  GI: soft, NT/ND, NABS  Vascular: no LE edema B/L, LUE AVF + bruit/thrill      LABS:  10-25    137  |  97<L>  |  25<H>  ----------------------------<  99  5.1   |  28  |  6.88<H>    Ca    7.7<L>      25 Oct 2017 06:40  Phos  4.6     10-25  Mg     2.2     10-25      Creatinine Trend: 6.88 <--, 4.79 <--, 8.60 <--, 7.25 <--, 5.10 <--, 6.88 <--, 4.91 <--                        10.0   7.47  )-----------( 196      ( 25 Oct 2017 06:40 )             31.2

## 2017-10-25 NOTE — PROGRESS NOTE ADULT - SUBJECTIVE AND OBJECTIVE BOX
chief complaint : left face cellulitis        SUBJECTIVE / OVERNIGHT EVENTS: pt appears comfortable, denies chest pain, shortness of breath, nausea, vomiting facial pain , going for PAT today      MEDICATIONS  (STANDING):  aspirin enteric coated 81 milliGRAM(s) Oral daily  atorvastatin 80 milliGRAM(s) Oral at bedtime  ciprofloxacin     Tablet 500 milliGRAM(s) Oral every 24 hours  ciprofloxacin/dexamethasone Suspension Otic 4 Drop(s) Left Ear four times a day  clopidogrel Tablet 75 milliGRAM(s) Oral daily  dextrose 5%. 1000 milliLiter(s) (50 mL/Hr) IV Continuous <Continuous>  dextrose 50% Injectable 12.5 Gram(s) IV Push once  dextrose 50% Injectable 25 Gram(s) IV Push once  dextrose 50% Injectable 25 Gram(s) IV Push once  doxycycline hyclate Capsule 100 milliGRAM(s) Oral every 12 hours  epoetin estelle Injectable 3000 Unit(s) IV Push <User Schedule>  heparin  Injectable 5000 Unit(s) SubCutaneous every 8 hours  insulin lispro (HumaLOG) corrective regimen sliding scale   SubCutaneous at bedtime  insulin lispro (HumaLOG) corrective regimen sliding scale   SubCutaneous three times a day before meals  lactobacillus acidophilus 1 Tablet(s) Oral every 12 hours  metoprolol 12.5 milliGRAM(s) Oral two times a day  Nephro-emil 1 Tablet(s) Oral daily  NIFEdipine XL 60 milliGRAM(s) Oral every 12 hours  pantoprazole    Tablet 40 milliGRAM(s) Oral before breakfast    MEDICATIONS  (PRN):  acetaminophen   Tablet. 650 milliGRAM(s) Oral every 6 hours PRN Mild Pain (1 - 3)  dextrose Gel 1 Dose(s) Oral once PRN Blood Glucose LESS THAN 70 milliGRAM(s)/deciliter  glucagon  Injectable 1 milliGRAM(s) IntraMuscular once PRN Glucose LESS THAN 70 milligrams/deciliter  oxyCODONE    IR 5 milliGRAM(s) Oral every 6 hours PRN moderate to severe pain      Vital Signs Last 24 Hrs  T(C): 36.9 (25 Oct 2017 22:26), Max: 37.3 (25 Oct 2017 13:36)  T(F): 98.5 (25 Oct 2017 22:26), Max: 99.2 (25 Oct 2017 13:36)  HR: 91 (25 Oct 2017 17:56) (71 - 91)  BP: 151/71 (25 Oct 2017 22:26) (120/64 - 164/73)  BP(mean): --  RR: 18 (25 Oct 2017 22:26) (18 - 18)  SpO2: 98% (25 Oct 2017 22:26) (95% - 100%)  Constitutional: No fever, fatigue  Skin: No rash.  Eyes: No recent vision problems or eye pain.  ENT: No congestion, ear pain, or sore throat.  Cardiovascular: No chest pain or palpation.  Respiratory: No cough, shortness of breath, congestion, or wheezing.  Gastrointestinal: No abdominal pain, nausea, vomiting, or diarrhea.  Genitourinary: No dysuria.  Musculoskeletal: No joint swelling.  Neurologic: No headache.    PHYSICAL EXAM:  GENERAL: NAD  EYES: EOMI, PERRLA  NECK: Supple, No JVD  dec left face swelling / erythema  CHEST/LUNG:   HEART:  S1 , S2 +  ABDOMEN: soft, bs+  EXTREMITIES:  NO  edema  NEUROLOGY: alert awake     LABS:  10-25    137  |  97<L>  |  25<H>  ----------------------------<  99  5.1   |  28  |  6.88<H>    Ca    7.7<L>      25 Oct 2017 06:40  Phos  4.6     10-25  Mg     2.2     10-25      Creatinine Trend: 6.88 <--, 4.79 <--, 8.60 <--, 7.25 <--, 5.10 <--, 6.88 <--, 4.91 <--                        10.0   7.47  )-----------( 196      ( 25 Oct 2017 06:40 )             31.2     Urine Studies:  Urinalysis Basic - ( 19 Oct 2017 07:00 )    Color: YELLOW / Appearance: CLEAR / S.010 / pH: 8.5  Gluc: 250 / Ketone: NEGATIVE  / Bili: NEGATIVE / Urobili: NORMAL E.U.   Blood: NEGATIVE / Protein: 300 / Nitrite: NEGATIVE   Leuk Esterase: NEGATIVE / RBC: 0-2 / WBC 0-2   Sq Epi: OCC / Non Sq Epi:  / Bacteria:

## 2017-10-26 DIAGNOSIS — I63.9 CEREBRAL INFARCTION, UNSPECIFIED: ICD-10-CM

## 2017-10-26 LAB
BUN SERPL-MCNC: 33 MG/DL — HIGH (ref 7–23)
CALCIUM SERPL-MCNC: 7.2 MG/DL — LOW (ref 8.4–10.5)
CHLORIDE SERPL-SCNC: 97 MMOL/L — LOW (ref 98–107)
CO2 SERPL-SCNC: 25 MMOL/L — SIGNIFICANT CHANGE UP (ref 22–31)
CREAT SERPL-MCNC: 8.64 MG/DL — HIGH (ref 0.5–1.3)
GLUCOSE BLDC GLUCOMTR-MCNC: 123 MG/DL — HIGH (ref 70–99)
GLUCOSE BLDC GLUCOMTR-MCNC: 156 MG/DL — HIGH (ref 70–99)
GLUCOSE BLDC GLUCOMTR-MCNC: 164 MG/DL — HIGH (ref 70–99)
GLUCOSE BLDC GLUCOMTR-MCNC: 164 MG/DL — HIGH (ref 70–99)
GLUCOSE BLDC GLUCOMTR-MCNC: 201 MG/DL — HIGH (ref 70–99)
GLUCOSE SERPL-MCNC: 168 MG/DL — HIGH (ref 70–99)
HCT VFR BLD CALC: 26.1 % — LOW (ref 39–50)
HCT VFR BLD CALC: 34.2 % — LOW (ref 39–50)
HGB BLD-MCNC: 10.8 G/DL — LOW (ref 13–17)
HGB BLD-MCNC: 8.6 G/DL — LOW (ref 13–17)
MCHC RBC-ENTMCNC: 30.8 PG — SIGNIFICANT CHANGE UP (ref 27–34)
MCHC RBC-ENTMCNC: 31.3 PG — SIGNIFICANT CHANGE UP (ref 27–34)
MCHC RBC-ENTMCNC: 31.6 % — LOW (ref 32–36)
MCHC RBC-ENTMCNC: 33 % — SIGNIFICANT CHANGE UP (ref 32–36)
MCV RBC AUTO: 94.9 FL — SIGNIFICANT CHANGE UP (ref 80–100)
MCV RBC AUTO: 97.4 FL — SIGNIFICANT CHANGE UP (ref 80–100)
NRBC # FLD: 0 — SIGNIFICANT CHANGE UP
NRBC # FLD: 0 — SIGNIFICANT CHANGE UP
PLATELET # BLD AUTO: 166 K/UL — SIGNIFICANT CHANGE UP (ref 150–400)
PLATELET # BLD AUTO: 202 K/UL — SIGNIFICANT CHANGE UP (ref 150–400)
PMV BLD: 10.1 FL — SIGNIFICANT CHANGE UP (ref 7–13)
PMV BLD: 10.4 FL — SIGNIFICANT CHANGE UP (ref 7–13)
POTASSIUM SERPL-MCNC: 5.4 MMOL/L — HIGH (ref 3.5–5.3)
POTASSIUM SERPL-SCNC: 5.4 MMOL/L — HIGH (ref 3.5–5.3)
RBC # BLD: 2.75 M/UL — LOW (ref 4.2–5.8)
RBC # BLD: 3.51 M/UL — LOW (ref 4.2–5.8)
RBC # FLD: 14.4 % — SIGNIFICANT CHANGE UP (ref 10.3–14.5)
RBC # FLD: 14.4 % — SIGNIFICANT CHANGE UP (ref 10.3–14.5)
SODIUM SERPL-SCNC: 137 MMOL/L — SIGNIFICANT CHANGE UP (ref 135–145)
WBC # BLD: 7.49 K/UL — SIGNIFICANT CHANGE UP (ref 3.8–10.5)
WBC # BLD: 7.92 K/UL — SIGNIFICANT CHANGE UP (ref 3.8–10.5)
WBC # FLD AUTO: 7.49 K/UL — SIGNIFICANT CHANGE UP (ref 3.8–10.5)
WBC # FLD AUTO: 7.92 K/UL — SIGNIFICANT CHANGE UP (ref 3.8–10.5)

## 2017-10-26 PROCEDURE — 99233 SBSQ HOSP IP/OBS HIGH 50: CPT

## 2017-10-26 PROCEDURE — 99233 SBSQ HOSP IP/OBS HIGH 50: CPT | Mod: GC

## 2017-10-26 RX ADMIN — Medication 1 TABLET(S): at 12:20

## 2017-10-26 RX ADMIN — Medication 100 MILLIGRAM(S): at 18:51

## 2017-10-26 RX ADMIN — Medication 500 MILLIGRAM(S): at 18:50

## 2017-10-26 RX ADMIN — CIPROFLOXACIN AND DEXAMETHASONE 4 DROP(S): 3; 1 SUSPENSION/ DROPS AURICULAR (OTIC) at 18:53

## 2017-10-26 RX ADMIN — Medication 12.5 MILLIGRAM(S): at 18:50

## 2017-10-26 RX ADMIN — CLOPIDOGREL BISULFATE 75 MILLIGRAM(S): 75 TABLET, FILM COATED ORAL at 12:20

## 2017-10-26 RX ADMIN — CIPROFLOXACIN AND DEXAMETHASONE 4 DROP(S): 3; 1 SUSPENSION/ DROPS AURICULAR (OTIC) at 12:20

## 2017-10-26 RX ADMIN — Medication 81 MILLIGRAM(S): at 12:20

## 2017-10-26 RX ADMIN — Medication 1 TABLET(S): at 18:51

## 2017-10-26 RX ADMIN — Medication 60 MILLIGRAM(S): at 18:52

## 2017-10-26 RX ADMIN — CIPROFLOXACIN AND DEXAMETHASONE 4 DROP(S): 3; 1 SUSPENSION/ DROPS AURICULAR (OTIC) at 00:57

## 2017-10-26 RX ADMIN — Medication 1: at 12:20

## 2017-10-26 RX ADMIN — ERYTHROPOIETIN 3000 UNIT(S): 10000 INJECTION, SOLUTION INTRAVENOUS; SUBCUTANEOUS at 07:55

## 2017-10-26 RX ADMIN — ATORVASTATIN CALCIUM 80 MILLIGRAM(S): 80 TABLET, FILM COATED ORAL at 22:10

## 2017-10-26 RX ADMIN — HEPARIN SODIUM 5000 UNIT(S): 5000 INJECTION INTRAVENOUS; SUBCUTANEOUS at 12:21

## 2017-10-26 RX ADMIN — HEPARIN SODIUM 5000 UNIT(S): 5000 INJECTION INTRAVENOUS; SUBCUTANEOUS at 22:10

## 2017-10-26 NOTE — PROGRESS NOTE ADULT - PROBLEM SELECTOR PROBLEM 7
Preventive measure
Preventive measure
DM (diabetes mellitus)
Preventive measure

## 2017-10-26 NOTE — PROGRESS NOTE ADULT - SUBJECTIVE AND OBJECTIVE BOX
Neurology Progress Note    Interval History - No overnight events. No active or acute complaints.     HPI:  The patient is a 75 yo m, with pmhx of CAD on dual antiplatelet therapy, ESRD on HD, HTN P/W 3 days onset of confusion and lethargy as well as left sided weakness. Today during HD reportedly he was more confused and the wife noticed more pronounced left sided weakness. In ED he is improved back to his baseline. At baseline he A&O x 1 with complete dependency on self care.    MEDICATIONS  (STANDING):  aspirin enteric coated 81 milliGRAM(s) Oral daily  atorvastatin 80 milliGRAM(s) Oral at bedtime  ciprofloxacin     Tablet 500 milliGRAM(s) Oral every 24 hours  ciprofloxacin/dexamethasone Suspension Otic 4 Drop(s) Left Ear four times a day  dextrose 5%. 1000 milliLiter(s) (50 mL/Hr) IV Continuous <Continuous>  dextrose 50% Injectable 12.5 Gram(s) IV Push once  dextrose 50% Injectable 25 Gram(s) IV Push once  dextrose 50% Injectable 25 Gram(s) IV Push once  doxycycline hyclate Capsule 100 milliGRAM(s) Oral every 12 hours  epoetin estelle Injectable 3000 Unit(s) IV Push <User Schedule>  insulin lispro (HumaLOG) corrective regimen sliding scale   SubCutaneous at bedtime  insulin lispro (HumaLOG) corrective regimen sliding scale   SubCutaneous three times a day before meals  lactobacillus acidophilus 1 Tablet(s) Oral every 12 hours  metoprolol 12.5 milliGRAM(s) Oral two times a day  Nephro-emil 1 Tablet(s) Oral daily  NIFEdipine XL 60 milliGRAM(s) Oral every 12 hours  pantoprazole    Tablet 40 milliGRAM(s) Oral before breakfast    MEDICATIONS  (PRN):  acetaminophen   Tablet. 650 milliGRAM(s) Oral every 6 hours PRN Mild Pain (1 - 3)  dextrose Gel 1 Dose(s) Oral once PRN Blood Glucose LESS THAN 70 milliGRAM(s)/deciliter  glucagon  Injectable 1 milliGRAM(s) IntraMuscular once PRN Glucose LESS THAN 70 milligrams/deciliter  oxyCODONE    IR 5 milliGRAM(s) Oral every 6 hours PRN moderate to severe pain    FAMILY HISTORY: o pertinent family history in first degree relatives  NKDA or intolerances  SHx - No smoking, No ETOH, No drug abuse    Review of Systems:  CONSTITUTIONAL:  No weight loss, fever, chills, weakness or fatigue.  HEENT:  Eyes:  No visual loss, blurred vision, double vision or yellow sclerae. Ears, Nose, Throat:  No hearing loss, sneezing, congestion, runny nose or sore throat.  CARDIOVASCULAR:  No chest pain, chest pressure or chest discomfort. No palpitations or edema.  GASTROINTESTINAL:  No anorexia, nausea, vomiting or diarrhea. No abdominal pain or blood.  NEUROLOGICAL: See HPI  MUSCULOSKELETAL:  No muscle, back pain, joint pain or stiffness.  PSYCHIATRIC:  No history of depression or anxiety.    Vital Signs Last 24 Hrs  T(C): 36.8 (23 Oct 2017 10:44), Max: 37.3 (22 Oct 2017 18:24)  T(F): 98.3 (23 Oct 2017 10:44), Max: 99.1 (22 Oct 2017 18:24)  HR: 73 (23 Oct 2017 10:44) (66 - 78)  BP: 150/58 (23 Oct 2017 10:44) (129/53 - 152/65)  BP(mean): --  RR: 18 (23 Oct 2017 10:44) (18 - 19)  SpO2: 100% (23 Oct 2017 10:44) (98% - 100%)    General Exam:   General appearance: No acute distress                  Neurological Exam:  Mental Status: Alert, oriented to self and place (Knows he is in hospital, does not know which), follows commands, cooperative with the exam  Cranial Nerves: CN I - not tested.  PERRL, EOMI, VFF, no nystagmus or diplopia.  No APD.    Mild facial asymmetry, likely due to swelling.   Motor RUE mild weakness     Sensation: intact to light touch.  Coordination: right sided FTN ataxia - out of proportion to weakness     Labs:                  10.4   12.10 )-----------( 186      ( 23 Oct 2017 08:00 )             31.9   10-23    142  |  103  |  30<H>  ----------------------------<  108<H>  5.5<H>   |  25  |  8.60<H>    Ca    7.9<L>      23 Oct 2017 06:04  Phos  4.0     10-23  Mg     2.1     10-23    TPro  6.3  /  Alb  3.4  /  TBili  0.5  /  DBili  x   /  AST  16  /  ALT  9   /  AlkPhos  43  10-23    Radiology:  CT head, CTA head and neck (10/24) -  Brain CT:  No evidence of acute intracranial hemorrhage, mass effect, or territorial   infarct. There may be mild superimposed hydrocephalus upon central   cerebral volume loss in the appropriate clinical setting.    CTA neck:  No significant carotid stenosis by NASCET criteria. Narrowing of the left   V4 segment without loss of flow related enhancement.     CTA brain:  No abrupt vessel cut off or aneurysm involving the [place].   Nonvisualized A1 segment.      MRI brain 10/18, MRA head 10/18, MRA neck w/o matt 10/18:  New focal infarct in the the left parietal white matter without    hemorrhagic transformation.    Unchanged enlarged lateral and third ventricles and lower bifrontal   temporal sulci with sulcal effacement at the vertex, this may be seen   with normal pressure hydrocephalus. Hyperintense T2 and FLAIR signal   along the ventricular margins may represent transependymal CSF.    Unchanged volume loss with nonspecific foci of lacunar infarction and   white matter hyperintense T2 and FLAIR signal likely microvascular   disease. No new hemorrhage or midline shift.    Left mastoid mastoid opacification with increased extension toward the   left petrous apex and increased soft tissue swelling of the left external   auditory canal, left frontotemporal parietal scalp toward the vertex, and   inferiorly into the left parotid,  and parapharyngeal spaces   concerning for progressive infectious and/or inflammatory change. In   patient with diabetes, a coalescent mastoiditis and a necrotizing otitis   externa and cellulitis possibly from Pseudomonas infection with other   infectious etiologies is not excluded.    Intracranially there is multifocal severe stenosis of the left cavernous   and super clinoid ICA with a dominant right ICA with narrowing likely   from atherosclerotic vascular opacification. There is a dominant right A1   and hypoplastic left A1 likely anatomic variation.    There is a 3.5 cm stenosis along the proximal subclavian artery with left   subclavian steal phenomena with a left vertebral demonstrating absent   flow in the ascending 2-D time-of-flight MRA and presence of flow in the   descending 2-D time-of-flight MRA.    CT head 10/17  Progressive soft tissue swelling over the left frontotemporoparietal   region with indistinct subgaleal fluid collections. Extensive   opacification of the left mastoid air cells with questionable   indistinctness of the bony walls of several mastoid air cells. Findings   are concerning for necrotizing otitis externa with extension into the   fissures of [place], left parotid and  spaces and associated   coalescent mastoiditis. Evaluation is limited in the absence of   intravenous contrast.    Unchanged enlarged lateral and third ventricles and lower bifrontal   temporal sulci with slight sulcal effacement of the vertex this can be   seen with normal pressure hydrocephalus as well as areas of microvascular   disease. No evidence of intracranial hemorrhage or midline shift.    Multiple air-fluid levels within the sphenoid sinus, underlying sphenoid   sinusitis is not excluded.    US head   No abscess is identified. Possible inflammatory fat lobulation in the   left buccal region or lymph node. Correlation with contrast-enhanced   cross-sectional imaging may be useful for better characterization of the   swelling.    Carotid duplex (09/12) - No duplex evidence of hemodynamically significant internal carotid artery   stenosis.

## 2017-10-26 NOTE — PROGRESS NOTE ADULT - PROBLEM SELECTOR PLAN 6
FS, Sliding scale, Hold Oral Hypoglycemic agents  HgbA1c, Fructosamine, TSH,
cont statin

## 2017-10-26 NOTE — PROGRESS NOTE ADULT - ASSESSMENT
76 year old male with CAD/PCI, HTN, PVD, ESRD on HD admitted with + CVA / Facial cellulitis    1. cv stable   no chest pain or sob  no evidence of decomp CHF on exam    PAT revealing mod MR, normal LV sys fx, mod to sev LVOT, no PFO   continue with BB/ CCB    2. CAD/PCI  stable   continue with ASA/ BB    3. Facial cellulitis  MRA/MRI/ CT head results noted   ENT f.u  + Mastoiditis, Left otitis externa  PO abx   ID f./u     4. HTN   bp acceptable   cont BB, procardia    5. ESRD on hd  renal f.u     6. Neuro F/u   MRA/MRI head revealing new focal infarct   continue with ASA /plavix / statin   PAT results noted with no obvious cardiac source for embolism   CTA head/ neck results noted   pending ILR           dvt ppx

## 2017-10-26 NOTE — PROGRESS NOTE ADULT - PROBLEM SELECTOR PLAN 10
X ray left Shoulder,
X ray left Shoulder,
STABLE
X ray left Shoulder,

## 2017-10-26 NOTE — PROGRESS NOTE ADULT - ATTENDING COMMENTS
[facility] NEPHROLOGY  Ihsan Saba M.D.  Eduardo Romano D.O.  Loni Ribeiro M.D.  Maris Ramirez, MSN, ANP-C    Telephone: [phone]  Facsimile: [phone]    [address] [place] [address]

## 2017-10-26 NOTE — PROGRESS NOTE ADULT - ATTENDING COMMENTS
Agree with above NP note.    s/p cva  louis without la/barbara thrombus  ilr for occult Af  antiplatelets for cad/cva

## 2017-10-26 NOTE — PROGRESS NOTE ADULT - SUBJECTIVE AND OBJECTIVE BOX
CARDIOLOGY FOLLOW UP - Dr. Flaherty    CC no acute events        PHYSICAL EXAM:  T(C): 36.6 (10-26-17 @ 09:45), Max: 37.3 (10-25-17 @ 13:36)  HR: 83 (10-26-17 @ 09:45) (76 - 91)  BP: 126/65 (10-26-17 @ 09:45) (109/52 - 164/73)  RR: 16 (10-26-17 @ 09:45) (16 - 18)  SpO2: 100% (10-26-17 @ 06:07) (95% - 100%)  Wt(kg): --  I&O's Summary    26 Oct 2017 07:01  -  26 Oct 2017 13:27  --------------------------------------------------------  IN: 500 mL / OUT: 2200 mL / NET: -1700 mL        Appearance: Normal	  Cardiovascular: Normal S1 S2,RRR + sys murmur   Respiratory: Lungs clear to auscultation	  Gastrointestinal:  Soft, Non-tender, + BS	  Extremities: Normal range of motion, No clubbing, cyanosis or edema        MEDICATIONS  (STANDING):  aspirin enteric coated 81 milliGRAM(s) Oral daily  atorvastatin 80 milliGRAM(s) Oral at bedtime  ciprofloxacin     Tablet 500 milliGRAM(s) Oral every 24 hours  ciprofloxacin/dexamethasone Suspension Otic 4 Drop(s) Left Ear four times a day  clopidogrel Tablet 75 milliGRAM(s) Oral daily  dextrose 5%. 1000 milliLiter(s) (50 mL/Hr) IV Continuous <Continuous>  dextrose 50% Injectable 12.5 Gram(s) IV Push once  dextrose 50% Injectable 25 Gram(s) IV Push once  dextrose 50% Injectable 25 Gram(s) IV Push once  doxycycline hyclate Capsule 100 milliGRAM(s) Oral every 12 hours  epoetin estelle Injectable 3000 Unit(s) IV Push <User Schedule>  heparin  Injectable 5000 Unit(s) SubCutaneous every 8 hours  insulin lispro (HumaLOG) corrective regimen sliding scale   SubCutaneous at bedtime  insulin lispro (HumaLOG) corrective regimen sliding scale   SubCutaneous three times a day before meals  lactobacillus acidophilus 1 Tablet(s) Oral every 12 hours  metoprolol 12.5 milliGRAM(s) Oral two times a day  Nephro-emil 1 Tablet(s) Oral daily  NIFEdipine XL 60 milliGRAM(s) Oral every 12 hours  pantoprazole    Tablet 40 milliGRAM(s) Oral before breakfast      TELEMETRY: 	    ECG:  	  RADIOLOGY:   DIAGNOSTIC TESTING:  [x  ] Echocardiogram:  < from: Transesophageal Echocardiogram (10.25.17 @ 17:19) >  ------------------------------------------------------------------------  CONCLUSIONS:  1. Thickened mitral valve leaflets with redundant chordae  of the anterior leaflet. There is intermittent chordal  systolic anterior motion. Intermittent moderate mitral  regurgitation. Vena contracta  about  0.5 cm.  2. Linear strands on the left ventricular side of the  aortic leaflets consistent with Lambl's excresences. Normal  trileaflet aortic valve.  3. Normal size aortic root, arch, and descending thoracic  aorta.  4. Normal left atrium. No leftatrial or left atrial  appendage thrombus.  5. Severe concentric left ventricular hypertrophy.  Prominent basal septum.  6. Normal left ventricular systolic function. No segmental  wall motion abnormalities. Peak left ventricular outflow  tract gradient equals 68 mm Hg, consistent with moderately  severe LVOT obstruction.  7. Normal right ventricular size and function.  8. Agitated saline injection demonstrates no evidence of a  patent foramen ovale.  ------------------------------------------------------------------------    < end of copied text >    [ ]  Catheterization:  [ ] Stress Test:    OTHER: 	  < from: CT Angio Neck w/ IV Cont (10.24.17 @ 15:11) >    EXAM:  CT ANGIO BRAIN (W)AW IC      EXAM:  CT ANGIO NECK (W)AW IC        PROCEDURE DATE:  Oct 24 2017       < from: CT Angio Neck w/ IV Cont (10.24.17 @ 15:11) >  IMPRESSION:    Brain CT:  No evidence of acute intracranial hemorrhage, mass effect, or territorial   infarct. There may be mild superimposed hydrocephalus upon central   cerebral volume loss in the appropriateclinical setting.    CTA neck:  No significant carotid stenosis by NASCET criteria. Narrowing of the left   V4 segment without loss of flow related enhancement.     CTA brain:  No abrupt vessel cut off or aneurysm involving the [place].   Nonvisualized A1 segment.              < end of copied text >      LABS:	 	                                10.8   7.92  )-----------( 202      ( 26 Oct 2017 11:44 )             34.2     10-26    137  |  97<L>  |  33<H>  ----------------------------<  168<H>  5.4<H>   |  25  |  8.64<H>    Ca    7.2<L>      26 Oct 2017 06:45  Phos  4.6     10-25  Mg     2.2     10-25

## 2017-10-26 NOTE — PROGRESS NOTE ADULT - ASSESSMENT
77 yo man with PMHx of CAD on dual antiplatelet therapy, HTN, ESRD on HD, presented with 3 days of lethargy and weakness followed by sudden onset of encephalopathy which described as unresponsiveness, during dialysis on 10/22. MRI brain showed an acute stroke  involving left JULI-MCA borderzone distribution and moderate to severe leukoaraiosis. MRA head and neck to my eyes showed mild-to-moderate stenosis of left ICA involving cavernous and supraclinoid segments without any other significant intracranial or extracranial large vessel severe stenosis or occlusion. Transthoracic echocardiogram did not show any obvious structural cardiac source of embolism. CTA showed narrowing of the left V4 segment without loss of flow related enhancement.     Impression:   Cerebral embolism with cerebral infarction. Acute stroke involving the left JULI-MCA borderzone distribution - likely etiology being embolism from a proximal source like cardiac/paradoxical source of embolism     Reccs:   - Pt should receive Plavix for 3 months.  - C/w Aspirin 81mg daily indefinitely  - C/w Atorvastatin 80mg daily, LDL- 93  - Hba1c - 5.3  - Loop recorder placement, discussed with primary team  - Continue medical and infectious management per Primary team  - Pt may follow up in the outpatient clinic. No further Neurological workup.    Spectra [phone] for any questions/concerns. Will sign off. Thank you. 77 yo man with PMHx of CAD on dual antiplatelet therapy, HTN, ESRD on HD, presented with 3 days of lethargy and weakness followed by sudden onset of encephalopathy which described as unresponsiveness, during dialysis on 10/22. MRI brain showed an acute stroke  involving left JULI-MCA borderzone distribution and moderate to severe leukoaraiosis. MRA head and neck to my eyes showed mild-to-moderate stenosis of left ICA involving cavernous and supraclinoid segments without any other significant intracranial or extracranial large vessel severe stenosis or occlusion. Transthoracic echocardiogram did not show any obvious structural cardiac source of embolism. CTA showed narrowing of the left V4 segment without loss of flow related enhancement.     Impression:   Cerebral embolism with cerebral infarction. Acute stroke involving the left JULI-MCA borderzone distribution - likely etiology being embolism from a proximal source like cardiac/paradoxical source of embolism     Reccs:   - C/w Plavix for 3 months.  - C/w Aspirin 81mg daily indefinitely  - C/w Chemoprophylaxis for DVT  - C/w Atorvastatin 80mg daily, LDL- 93  - Hba1c - 5.3  - Loop recorder placement, discussed with primary team  - Continue medical and infectious management per Primary team  - Pt may follow up in the outpatient clinic. No further Neurological workup.    Spectra [id number] for any questions/concerns. Will sign off. Thank you. 77 yo man with PMHx of CAD on dual antiplatelet therapy, HTN, ESRD on HD, presented with 3 days of lethargy and weakness followed by sudden onset of encephalopathy which described as unresponsiveness, during dialysis on 10/22. MRI brain showed an acute stroke  involving left JULI-MCA borderzone distribution and moderate to severe leukoaraiosis. MRA head and neck to my eyes showed mild-to-moderate stenosis of left ICA involving cavernous and supraclinoid segments without any other significant intracranial or extracranial large vessel severe stenosis or occlusion. Transthoracic echocardiogram did not show any obvious structural cardiac source of embolism. CTA showed mild-to-moderate stenosis of the left [place] and supraclinoid (about 50%) segments of ICA in moderate to severe intracranial left vertebral artery stenosis without any other significant extracranial large vessel severe stenosis or occlusion. Transesophageal echocardiogram did not show any structural cardiac source of embolism nor showed any evidence of PFO.    Impression:   Cerebral embolism with cerebral infarction. Left JULI-MCA borderzone distribution stroke - likely etiology being large vessel disease i.e. intracranial atherosclerosis leading to artery to artery embolism versus embolism from a proximal source like cardiac source of embolism, favoring the former     Reccs:   - C/w Plavix for 3 months.  - C/w Aspirin 81mg daily indefinitely, unless contraindicated due to any specific reasons in the future  - C/w pharmacological DVT prophylaxis    - C/w Atorvastatin 80mg at bedtime considering likely atheroembolic etiology of his stroke, LDL- 93  - Hba1c - 5.3  - Prolonged clinic monitoring with ICM to screen for occult cardiac arrhythmias like paroxysmal atrial fibrillation being a cardiac source of embolism  - Continue medical and infectious management per Primary team  - Follow up in the office, please call with questions    Above-mentioned plan was discussed with patient and his wife at bedside in detail. All the questions were answered and concerns were addressed.

## 2017-10-26 NOTE — PROGRESS NOTE ADULT - SUBJECTIVE AND OBJECTIVE BOX
[facility] NEPHROLOGY- PROGRESS NOTE    76 year old male h/o ESRD on HD presents with L facial swelling 2/2 cellulitis course c/b new infarcts seen on MRI head. Nephrology consulted for ESRD status.    REVIEW OF SYSTEMS:  Gen: no changes in weight  Cards: no chest pain  Resp: no dyspnea  GI: no nausea or vomiting or diarrhea  Vascular: no LE edema    No Known Allergies      Hospital Medications: Medications reviewed      VITALS:  T(F): 97.7 (10-26-17 @ 14:19), Max: 98.5 (10-25-17 @ 22:26)  HR: 89 (10-26-17 @ 14:19)  BP: 147/78 (10-26-17 @ 14:19)  RR: 16 (10-26-17 @ 14:19)  SpO2: 95% (10-26-17 @ 14:19)  Wt(kg): --    10-26 @ 07:01  -  10-26 @ 16:41  --------------------------------------------------------  IN: 500 mL / OUT: 2200 mL / NET: -1700 mL      PHYSICAL EXAM:    Gen: NAD, calm, + left facial swelling resolved  Cards: RRR, +S1/S2, + EH  Resp: CTA B/L  GI: soft, NT/ND, NABS  Vascular: no LE edema B/L, LUE AVF + bruit/thrill      LABS:  10-26    137  |  97<L>  |  33<H>  ----------------------------<  168<H>  5.4<H>   |  25  |  8.64<H>    Ca    7.2<L>      26 Oct 2017 06:45  Phos  4.6     10-25  Mg     2.2     10-25      Creatinine Trend: 8.64 <--, 6.88 <--, 4.79 <--, 8.60 <--, 7.25 <--, 5.10 <--, 6.88 <--                        10.8   7.92  )-----------( 202      ( 26 Oct 2017 11:44 )             34.2

## 2017-10-26 NOTE — PROGRESS NOTE ADULT - PROBLEM SELECTOR PLAN 8
PT  REEVAL
Neuro f/u, R/O CVA / TIA , very unlikely,   MRI Head and MRA Neck and Head no contrast  PT consult, on ASA   Vit B12, Folate, iron studies, TSH , Ferritin,   Speech and swallow consult
DVT prophylaxis: VenodynES
Neuro f/u, R/O CVA / TIA , very unlikely,   MRI Head and MRA Neck and Head no contrast  PT consult, on ASA   Vit B12, Folate, iron studies, TSH , Ferritin,   Speech and swallow consult
Neuro f/u, R/O CVA / TIA , very unlikely,   MRI Head and MRA Neck and Head no contrast  PT consult, on ASA   Vit B12, Folate, iron studies, TSH , Ferritin,   Speech and swallow consult
PT  REEVAL

## 2017-10-26 NOTE — PROGRESS NOTE ADULT - SUBJECTIVE AND OBJECTIVE BOX
chief complaint : left face cellulitis        SUBJECTIVE / OVERNIGHT EVENTS: pt appears comfortable, denies chest pain, shortness of breath, nausea, vomiting facial pain     MEDICATIONS  (STANDING):  aspirin enteric coated 81 milliGRAM(s) Oral daily  atorvastatin 80 milliGRAM(s) Oral at bedtime  ciprofloxacin     Tablet 500 milliGRAM(s) Oral every 24 hours  ciprofloxacin/dexamethasone Suspension Otic 4 Drop(s) Left Ear four times a day  clopidogrel Tablet 75 milliGRAM(s) Oral daily  dextrose 5%. 1000 milliLiter(s) (50 mL/Hr) IV Continuous <Continuous>  dextrose 50% Injectable 12.5 Gram(s) IV Push once  dextrose 50% Injectable 25 Gram(s) IV Push once  dextrose 50% Injectable 25 Gram(s) IV Push once  doxycycline hyclate Capsule 100 milliGRAM(s) Oral every 12 hours  epoetin estelle Injectable 3000 Unit(s) IV Push <User Schedule>  heparin  Injectable 5000 Unit(s) SubCutaneous every 8 hours  insulin lispro (HumaLOG) corrective regimen sliding scale   SubCutaneous at bedtime  insulin lispro (HumaLOG) corrective regimen sliding scale   SubCutaneous three times a day before meals  lactobacillus acidophilus 1 Tablet(s) Oral every 12 hours  metoprolol 12.5 milliGRAM(s) Oral two times a day  Nephro-emil 1 Tablet(s) Oral daily  NIFEdipine XL 60 milliGRAM(s) Oral every 12 hours  pantoprazole    Tablet 40 milliGRAM(s) Oral before breakfast    MEDICATIONS  (PRN):  acetaminophen   Tablet. 650 milliGRAM(s) Oral every 6 hours PRN Mild Pain (1 - 3)  dextrose Gel 1 Dose(s) Oral once PRN Blood Glucose LESS THAN 70 milliGRAM(s)/deciliter  glucagon  Injectable 1 milliGRAM(s) IntraMuscular once PRN Glucose LESS THAN 70 milligrams/deciliter  oxyCODONE    IR 5 milliGRAM(s) Oral every 6 hours PRN moderate to severe pain      Vital Signs Last 24 Hrs  T(C): 36.8 (26 Oct 2017 18:54), Max: 36.9 (25 Oct 2017 22:26)  T(F): 98.2 (26 Oct 2017 18:54), Max: 98.5 (25 Oct 2017 22:26)  HR: 88 (26 Oct 2017 18:54) (83 - 89)  BP: 150/58 (26 Oct 2017 18:54) (109/52 - 151/71)  BP(mean): --  RR: 18 (26 Oct 2017 18:54) (16 - 18)  SpO2: 100% (26 Oct 2017 18:54) (95% - 100%)    Constitutional: No fever, fatigue  Skin: No rash.  Eyes: No recent vision problems or eye pain.  ENT: No congestion, ear pain, or sore throat.  Cardiovascular: No chest pain or palpation.  Respiratory: No cough, shortness of breath, congestion, or wheezing.  Gastrointestinal: No abdominal pain, nausea, vomiting, or diarrhea.  Genitourinary: No dysuria.  Musculoskeletal: No joint swelling.  Neurologic: No headache.    PHYSICAL EXAM:  GENERAL: NAD  EYES: EOMI, PERRLA  NECK: Supple, No JVD  dec left face swelling / erythema  CHEST/LUNG:   HEART:  S1 , S2 +  ABDOMEN: soft, bs+  EXTREMITIES:  NO  edema  NEUROLOGY: alert awake     LABS:  10-26    137  |  97<L>  |  33<H>  ----------------------------<  168<H>  5.4<H>   |  25  |  8.64<H>    Ca    7.2<L>      26 Oct 2017 06:45  Phos  4.6     10-25  Mg     2.2     10-25      Creatinine Trend: 8.64 <--, 6.88 <--, 4.79 <--, 8.60 <--, 7.25 <--, 5.10 <--, 6.88 <--                        10.8   7.92  )-----------( 202      ( 26 Oct 2017 11:44 )             34.2     Urine Studies:

## 2017-10-26 NOTE — PROGRESS NOTE ADULT - PROBLEM SELECTOR PLAN 9
STABLE
F/U CBC, ESRD on HD,
PT  REEVAL
STABLE

## 2017-10-26 NOTE — PROGRESS NOTE ADULT - PROBLEM SELECTOR PROBLEM 9
Thrombocytopenia
Weakness

## 2017-10-26 NOTE — CONSULT NOTE ADULT - SUBJECTIVE AND OBJECTIVE BOX
CHIEF COMPLAINT:  Asked by Dr. Jose Raul Ross to evaluate patient for implantable loop recorder.     HISTORY OF PRESENT ILLNESS:  Mr. Colyb is a pleasant 75 yo male with PMHx of CAD, Stent on ASA, HTN, ESRD on HD, DM, PVD , high cholesterol, mild dementia, presented with 3 days of lethargy and weakness (L>R) followed sudden onset of encephalopathy which described as unresponsiveness diagnosed with new CVA ( Left JULI-MCA borderzone distribution stroke ).      Additionally, he had worsening left sided weakness the day of dialysis . He has mastoiditis with possible necrotizing otitis externa as he is found to have facial swelling with indistinctness of bony walls and underlying  soft tissue spread of infection.     PAST MEDICAL & SURGICAL HISTORY:  Dementia  Thrombocytopenia  PVD (peripheral vascular disease)  CAD (coronary artery disease): s/p 1 stent  HLD (hyperlipidemia)  HTN (hypertension)  DM (diabetes mellitus)  ESRD (end stage renal disease)  H/O foot surgery: sec to ankle fracture  S/P cataract extraction  S/P coronary artery stent placement  S/P cholecystectomy  AVF (arteriovenous fistula)    PREVIOUS DIAGNOSTIC TESTING:      MEDICATIONS:  aspirin enteric coated 81 milliGRAM(s) Oral daily  clopidogrel Tablet 75 milliGRAM(s) Oral daily  heparin  Injectable 5000 Unit(s) SubCutaneous every 8 hours  metoprolol 12.5 milliGRAM(s) Oral two times a day  NIFEdipine XL 60 milliGRAM(s) Oral every 12 hours  ciprofloxacin     Tablet 500 milliGRAM(s) Oral every 24 hours  doxycycline hyclate Capsule 100 milliGRAM(s) Oral every 12 hours  Acetaminophen   Tablet. 650 milliGRAM(s) Oral every 6 hours PRN  oxyCODONE    IR 5 milliGRAM(s) Oral every 6 hours PRN  pantoprazole    Tablet 40 milliGRAM(s) Oral before breakfast  atorvastatin 80 milliGRAM(s) Oral at bedtime  dextrose 50% Injectable 12.5 Gram(s) IV Push once  dextrose 50% Injectable 25 Gram(s) IV Push once  dextrose 50% Injectable 25 Gram(s) IV Push once  dextrose Gel 1 Dose(s) Oral once PRN  glucagon  Injectable 1 milliGRAM(s) IntraMuscular once PRN  insulin lispro (HumaLOG) corrective regimen sliding scale   SubCutaneous at bedtime  insulin lispro (HumaLOG) corrective regimen sliding scale   SubCutaneous three times a day before meals  ciprofloxacin/dexamethasone Suspension Otic 4 Drop(s) Left Ear four times a day  dextrose 5%. 1000 milliLiter(s) IV Continuous <Continuous>  epoetin estelle Injectable 3000 Unit(s) IV Push <User Schedule>  Nephro-emil 1 Tablet(s) Oral daily      FAMILY HISTORY:  No pertinent family history in first degree relatives    SOCIAL HISTORY: Denies smoking, alcohol or drug abuse      Allergies: No Known Allergies    Intolerances:   REVIEW OF SYSTEMS:  CONSTITUTIONAL: No fever, weight loss, or fatigue  EYES: No eye pain, visual disturbances, or discharge + facial swelling   ENMT:  No difficulty hearing, tinnitus, vertigo; No sinus or throat pain  NECK: No pain or stiffness  RESPIRATORY: No cough, wheezing, chills or hemoptysis; No Shortness of Breath  CARDIOVASCULAR: No chest pain, palpitations, passing out, dizziness, or leg swelling  GASTROINTESTINAL: No abdominal or epigastric pain. No nausea, vomiting, or hematemesis; No diarrhea or constipation. No melena or hematochezia.  GENITOURINARY: No dysuria, frequency, hematuria, or incontinence  NEUROLOGICAL: No headaches, memory loss, loss of strength, numbness, or tremors  SKIN: No itching, burning, rashes, or lesions   LYMPH Nodes: No enlarged glands  ENDOCRINE: No heat or cold intolerance; No hair loss  MUSCULOSKELETAL: No joint pain or swelling; No muscle, back, or extremity pain  PSYCHIATRIC: No depression, anxiety, mood swings, or difficulty sleeping  HEME/LYMPH: No easy bruising, or bleeding gums  ALLERY AND IMMUNOLOGIC: No hives or eczema	      PHYSICAL EXAM:  T(C): 36.5 (10-26-17 @ 14:19), Max: 36.9 (10-25-17 @ 22:26)  HR: 89 (10-26-17 @ 14:19) (83 - 91)  BP: 147/78 (10-26-17 @ 14:19) (109/52 - 164/73)  RR: 16 (10-26-17 @ 14:19) (16 - 18)  SpO2: 95% (10-26-17 @ 14:19) (95% - 100%)  Wt(kg): --  I&O's Summary    26 Oct 2017 07:01  -  26 Oct 2017 17:33  --------------------------------------------------------  IN: 500 mL / OUT: 2200 mL / NET: -1700 mL    Appearance: Normal	  HEENT:   Normal oral mucosa, PERRL, EOMI	  Lymphatic: No lymphadenopathy  Cardiovascular: Normal S1 S2, No JVD, No murmurs, No edema  Respiratory: Lungs clear to auscultation	  Psychiatry: A & O x 3, Mood & affect appropriate  Gastrointestinal:  Soft, Non-tender, + BS	  Skin: No rashes, No ecchymoses, No cyanosis	  Neurologic: Non-focal  Extremities: Normal range of motion, No clubbing, cyanosis or edema  Vascular: Peripheral pulses palpable 2+ bilaterally    TELEMETRY:  SInus rhythm    ECG:  	  RADIOLOGY:  OTHER: 	  	  LABS:	 	    CARDIAC MARKERS:                                  10.8   7.92  )-----------( 202      ( 26 Oct 2017 11:44 )             34.2     10-26    137  |  97<L>  |  33<H>  ----------------------------<  168<H>  5.4<H>   |  25  |  8.64<H>    Ca    7.2<L>      26 Oct 2017 06:45  Phos  4.6     10-25  Mg     2.2     10-25      ASSESSMENT/PLAN: In summary, Mr. Colby is a pleasant 75 yo man with PMHx of CAD on dual antiplatelet therapy, HTN, ESRD on HD, presented with 3 days of lethargy and weakness followed by sudden onset of encephalopathy which described as unresponsiveness, during dialysis on 10/22. MRI brain showed an acute stroke  involving left JULI-MCA borderzone distribution and moderate to severe leukoaraiosis.     There is no telemetry evidence of atrial arrhythmias. Per CRYSTAL-AF, patient will benefit from prolonged monitoring for detection of AF/PAF as cause of potential cardioembolic source.  ILR implantation for prolonged monitoring for detection of suspected, asymptomatic AF/PAF advised as 2D TTE is without FLACA clot. There are no contraindications to long term anticoagulation.  Risks, benefits, and alternatives discussed with the next of kin, his wife.   Both patient and wife verbalized understanding and wife consented for the procedure (given h/o dementia ).     - Continue telemetry  - All other management per primary team.

## 2017-10-26 NOTE — PROGRESS NOTE ADULT - PROBLEM SELECTOR PLAN 1
Last HD earlier today with intradialytic hypotension and 1.7L removed. Plan for next maintenance HD on 10/28. Monitor electrolytes.

## 2017-10-26 NOTE — PROGRESS NOTE ADULT - PROBLEM SELECTOR PROBLEM 6
DM (diabetes mellitus)
HLD (hyperlipidemia)

## 2017-10-26 NOTE — PROGRESS NOTE ADULT - PROBLEM SELECTOR PROBLEM 10
Shoulder pain, left
Thrombocytopenia

## 2017-10-27 VITALS
DIASTOLIC BLOOD PRESSURE: 75 MMHG | HEART RATE: 84 BPM | TEMPERATURE: 98 F | RESPIRATION RATE: 18 BRPM | SYSTOLIC BLOOD PRESSURE: 145 MMHG | OXYGEN SATURATION: 100 %

## 2017-10-27 DIAGNOSIS — E87.5 HYPERKALEMIA: ICD-10-CM

## 2017-10-27 LAB
BUN SERPL-MCNC: 23 MG/DL — SIGNIFICANT CHANGE UP (ref 7–23)
CALCIUM SERPL-MCNC: 7.8 MG/DL — LOW (ref 8.4–10.5)
CHLORIDE SERPL-SCNC: 95 MMOL/L — LOW (ref 98–107)
CO2 SERPL-SCNC: 26 MMOL/L — SIGNIFICANT CHANGE UP (ref 22–31)
CREAT SERPL-MCNC: 5.92 MG/DL — HIGH (ref 0.5–1.3)
GLUCOSE BLDC GLUCOMTR-MCNC: 147 MG/DL — HIGH (ref 70–99)
GLUCOSE BLDC GLUCOMTR-MCNC: 211 MG/DL — HIGH (ref 70–99)
GLUCOSE SERPL-MCNC: 165 MG/DL — HIGH (ref 70–99)
HCT VFR BLD CALC: 32.8 % — LOW (ref 39–50)
HGB BLD-MCNC: 10.6 G/DL — LOW (ref 13–17)
MAGNESIUM SERPL-MCNC: 2 MG/DL — SIGNIFICANT CHANGE UP (ref 1.6–2.6)
MCHC RBC-ENTMCNC: 31.4 PG — SIGNIFICANT CHANGE UP (ref 27–34)
MCHC RBC-ENTMCNC: 32.3 % — SIGNIFICANT CHANGE UP (ref 32–36)
MCV RBC AUTO: 97 FL — SIGNIFICANT CHANGE UP (ref 80–100)
NRBC # FLD: 0 — SIGNIFICANT CHANGE UP
PLATELET # BLD AUTO: 198 K/UL — SIGNIFICANT CHANGE UP (ref 150–400)
PMV BLD: 9.9 FL — SIGNIFICANT CHANGE UP (ref 7–13)
POTASSIUM SERPL-MCNC: 5.6 MMOL/L — HIGH (ref 3.5–5.3)
POTASSIUM SERPL-SCNC: 5.6 MMOL/L — HIGH (ref 3.5–5.3)
RBC # BLD: 3.38 M/UL — LOW (ref 4.2–5.8)
RBC # FLD: 14.3 % — SIGNIFICANT CHANGE UP (ref 10.3–14.5)
SODIUM SERPL-SCNC: 137 MMOL/L — SIGNIFICANT CHANGE UP (ref 135–145)
WBC # BLD: 8.45 K/UL — SIGNIFICANT CHANGE UP (ref 3.8–10.5)
WBC # FLD AUTO: 8.45 K/UL — SIGNIFICANT CHANGE UP (ref 3.8–10.5)

## 2017-10-27 RX ORDER — METOPROLOL TARTRATE 50 MG
1 TABLET ORAL
Qty: 0 | Refills: 0 | COMMUNITY

## 2017-10-27 RX ORDER — SODIUM POLYSTYRENE SULFONATE 4.1 MEQ/G
30 POWDER, FOR SUSPENSION ORAL ONCE
Qty: 0 | Refills: 0 | Status: COMPLETED | OUTPATIENT
Start: 2017-10-27 | End: 2017-10-27

## 2017-10-27 RX ORDER — METOPROLOL TARTRATE 50 MG
0.5 TABLET ORAL
Qty: 30 | Refills: 0 | OUTPATIENT
Start: 2017-10-27

## 2017-10-27 RX ORDER — ASPIRIN/CALCIUM CARB/MAGNESIUM 324 MG
1 TABLET ORAL
Qty: 0 | Refills: 0 | COMMUNITY
Start: 2017-10-27

## 2017-10-27 RX ORDER — ATORVASTATIN CALCIUM 80 MG/1
1 TABLET, FILM COATED ORAL
Qty: 0 | Refills: 0 | COMMUNITY
Start: 2017-10-27

## 2017-10-27 RX ORDER — ACETAMINOPHEN 500 MG
2 TABLET ORAL
Qty: 0 | Refills: 0 | COMMUNITY
Start: 2017-10-27 | End: 2017-11-03

## 2017-10-27 RX ORDER — NIFEDIPINE 30 MG
1 TABLET, EXTENDED RELEASE 24 HR ORAL
Qty: 0 | Refills: 0 | COMMUNITY
Start: 2017-10-27

## 2017-10-27 RX ORDER — NIFEDIPINE 30 MG
1 TABLET, EXTENDED RELEASE 24 HR ORAL
Qty: 0 | Refills: 0 | COMMUNITY

## 2017-10-27 RX ORDER — CLOPIDOGREL BISULFATE 75 MG/1
1 TABLET, FILM COATED ORAL
Qty: 0 | Refills: 0 | COMMUNITY
Start: 2017-10-27

## 2017-10-27 RX ADMIN — SODIUM POLYSTYRENE SULFONATE 30 GRAM(S): 4.1 POWDER, FOR SUSPENSION ORAL at 13:57

## 2017-10-27 RX ADMIN — CLOPIDOGREL BISULFATE 75 MILLIGRAM(S): 75 TABLET, FILM COATED ORAL at 11:23

## 2017-10-27 RX ADMIN — PANTOPRAZOLE SODIUM 40 MILLIGRAM(S): 20 TABLET, DELAYED RELEASE ORAL at 06:40

## 2017-10-27 RX ADMIN — Medication 12.5 MILLIGRAM(S): at 06:40

## 2017-10-27 RX ADMIN — Medication 60 MILLIGRAM(S): at 08:59

## 2017-10-27 RX ADMIN — HEPARIN SODIUM 5000 UNIT(S): 5000 INJECTION INTRAVENOUS; SUBCUTANEOUS at 13:56

## 2017-10-27 RX ADMIN — CIPROFLOXACIN AND DEXAMETHASONE 4 DROP(S): 3; 1 SUSPENSION/ DROPS AURICULAR (OTIC) at 11:23

## 2017-10-27 RX ADMIN — Medication 100 MILLIGRAM(S): at 06:39

## 2017-10-27 RX ADMIN — CIPROFLOXACIN AND DEXAMETHASONE 4 DROP(S): 3; 1 SUSPENSION/ DROPS AURICULAR (OTIC) at 00:43

## 2017-10-27 RX ADMIN — CIPROFLOXACIN AND DEXAMETHASONE 4 DROP(S): 3; 1 SUSPENSION/ DROPS AURICULAR (OTIC) at 06:40

## 2017-10-27 RX ADMIN — Medication 2: at 12:27

## 2017-10-27 RX ADMIN — Medication 81 MILLIGRAM(S): at 11:23

## 2017-10-27 RX ADMIN — Medication 1 TABLET(S): at 11:22

## 2017-10-27 RX ADMIN — HEPARIN SODIUM 5000 UNIT(S): 5000 INJECTION INTRAVENOUS; SUBCUTANEOUS at 06:41

## 2017-10-27 RX ADMIN — Medication 1 TABLET(S): at 06:39

## 2017-10-27 NOTE — PROGRESS NOTE ADULT - PROBLEM SELECTOR PROBLEM 3
CAD (coronary artery disease)
Anemia of renal disease
CAD (coronary artery disease)
End stage renal failure on dialysis
Anemia of renal disease
CAD (coronary artery disease)

## 2017-10-27 NOTE — CHART NOTE - NSCHARTNOTEFT_GEN_A_CORE
s/p ILR implant.  wound site no bleeding or hematoma.  Device teaching done with patient's family at bedside.  F/u on 11/9 at 9:00 am.

## 2017-10-27 NOTE — PROGRESS NOTE ADULT - SUBJECTIVE AND OBJECTIVE BOX
[facility] NEPHROLOGY- PROGRESS NOTE    76 year old male h/o ESRD on HD presents with L facial swelling 2/2 cellulitis course c/b new infarcts seen on MRI head. Nephrology consulted for ESRD status.    REVIEW OF SYSTEMS:  Gen: no changes in weight  Cards: no chest pain  Resp: no dyspnea  GI: no nausea or vomiting or diarrhea  Vascular: no LE edema    No Known Allergies      Hospital Medications: Medications reviewed      VITALS:  T(F): 98.1 (10-27-17 @ 10:01), Max: 98.2 (10-26-17 @ 18:54)  HR: 72 (10-27-17 @ 10:01)  BP: 147/64 (10-27-17 @ 10:01)  RR: 18 (10-27-17 @ 10:01)  SpO2: 98% (10-27-17 @ 10:01)  Wt(kg): --    10-26 @ 07:01  -  10-27 @ 07:00  --------------------------------------------------------  IN: 500 mL / OUT: 2200 mL / NET: -1700 mL      PHYSICAL EXAM:    Gen: NAD, calm, + left facial swelling resolved  Cards: RRR, +S1/S2, + EH  Resp: CTA B/L  GI: soft, NT/ND, NABS  Vascular: no LE edema B/L, LUE AVF + bruit/thrill      LABS:  10-27    137  |  95<L>  |  23  ----------------------------<  165<H>  5.6<H>   |  26  |  5.92<H>    Ca    7.8<L>      27 Oct 2017 09:45  Mg     2.0     10-27      Creatinine Trend: 5.92 <--, 8.64 <--, 6.88 <--, 4.79 <--, 8.60 <--, 7.25 <--, 5.10 <--                        10.6   8.45  )-----------( 198      ( 27 Oct 2017 09:45 )             32.8

## 2017-10-27 NOTE — PROGRESS NOTE ADULT - PROBLEM SELECTOR PLAN 1
Last HD on 10/26 with intradialytic hypotension and 1.7L removed. Plan for next maintenance HD on 10/28. Monitor electrolytes.

## 2017-10-27 NOTE — PROGRESS NOTE ADULT - ATTENDING COMMENTS
[facility] NEPHROLOGY  Ihsan Saba M.D.  Eduardo Romano D.O.  Loni Ribeiro M.D.  Maris Ramirez, MSN, ANP-C    Telephone: [phone]  Facsimile: [phone]    [id number] [place] [address]

## 2017-10-27 NOTE — PROGRESS NOTE ADULT - PROBLEM SELECTOR PLAN 4
Serum phosphorus acceptable. Monitor serum calcium and phosphorus off binders.
on Clonidine, Procardia XL, Lopressor,     Hold Losartan  for Now
on Clonidine, Procardia XL, Lopressor,     Hold Losartan  for Now
Serum phosphorus acceptable. Monitor serum calcium and phosphorus off binders
Serum phosphorus acceptable. Monitor serum calcium and phosphorus off binders.
on ASA,  PAT revealing mod MR, normal LV sys fx, mod to sev LVOT, no PFO   continue with BB/ CCB
on Clonidine, Procardia XL, Lopressor,     Hold Losartan  for Now

## 2017-10-27 NOTE — PROGRESS NOTE ADULT - PROBLEM SELECTOR PROBLEM 5
HLD (hyperlipidemia)
HTN (hypertension)
Hyperkalemia

## 2017-10-27 NOTE — PROGRESS NOTE ADULT - ASSESSMENT
76 year old male with CAD/PCI, HTN, PVD, ESRD on HD admitted with + CVA / Facial cellulitis    1. cv stable   no chest pain or sob  no evidence of decomp CHF on exam    PAT revealing mod MR, normal LV sys fx, mod to sev LVOT, no PFO   continue with BB/ CCB    2. CAD/PCI  stable   continue with ASA/ BB    3. Facial cellulitis  MRA/MRI/ CT head results noted   ENT f.u  + Mastoiditis, Left otitis externa  PO abx   ID f./u     4. HTN   bp acceptable   cont BB, procardia    5. ESRD on hd  renal f.u     6. Neuro F/u   MRA/MRI head revealing new focal infarct   continue with ASA /plavix / statin   PAT results noted with no obvious cardiac source for embolism   CTA head/ neck results noted   pending ILR           dvt ppx 76 year old male with CAD/PCI, HTN, PVD, ESRD on HD admitted with + CVA / Facial cellulitis    1. cv stable   no chest pain or sob  no evidence of decomp CHF on exam    PAT revealing mod MR, normal LV sys fx, mod to sev LVOT, no PFO   continue with BB/ CCB    2. CAD/PCI  stable   continue with ASA/ BB    3. Facial cellulitis  MRA/MRI/ CT head results noted   ENT f.u  + Mastoiditis, Left otitis externa  PO abx   ID f./u     4. HTN   bp acceptable   cont BB, procardia    5. ESRD on hd  renal f.u     6. Neuro F/u   MRA/MRI head revealing new focal infarct   continue with ASA /plavix / statin   PAT results noted with no obvious cardiac source for embolism   CTA head/ neck results noted   s/p  ILR         dc planning per primary team   dvt ppx

## 2017-10-27 NOTE — PROGRESS NOTE ADULT - PROBLEM SELECTOR PLAN 5
Fasting Lipid
Mild. Would give kayexalate 30 grams PO X 1 dose prior to discharge.    No renal objection to discharge today.
on Clonidine, Procardia XL, Lopressor,     Hold Losartan  for Now

## 2017-10-27 NOTE — PROGRESS NOTE ADULT - PROBLEM SELECTOR PROBLEM 1
CVA (cerebral vascular accident)
End stage renal failure on dialysis
Facial cellulitis
End stage renal failure on dialysis
Facial cellulitis
Facial cellulitis

## 2017-10-27 NOTE — PROGRESS NOTE ADULT - PROBLEM SELECTOR PROBLEM 4
HTN (hypertension)
Hyperphosphatemia
HTN (hypertension)
CAD (coronary artery disease)
HTN (hypertension)
Hyperphosphatemia

## 2017-10-27 NOTE — PROGRESS NOTE ADULT - PROBLEM SELECTOR PROBLEM 2
Benign hypertension with ESRD (end-stage renal disease)
End stage renal failure on dialysis
Facial cellulitis
Benign hypertension with ESRD (end-stage renal disease)
End stage renal failure on dialysis
End stage renal failure on dialysis

## 2017-10-27 NOTE — PROGRESS NOTE ADULT - PROVIDER SPECIALTY LIST ADULT
Cardiology
ENT
Infectious Disease
Internal Medicine
Nephrology
Neurology
Neurology
Cardiology
Nephrology
Neurology
Internal Medicine
Internal Medicine

## 2017-10-27 NOTE — PROGRESS NOTE ADULT - ATTENDING COMMENTS
Agree with above NP note.    cv stable   new cva  louis without cardiac source  s/p ilr for occult af  cont antiplatelets as ordered

## 2017-11-15 ENCOUNTER — APPOINTMENT (OUTPATIENT)
Dept: HOME HEALTH SERVICES | Facility: HOME HEALTH | Age: 77
End: 2017-11-15

## 2017-11-15 ENCOUNTER — APPOINTMENT (OUTPATIENT)
Dept: HOME HEALTH SERVICES | Facility: HOME HEALTH | Age: 77
End: 2017-11-15
Payer: MEDICARE

## 2017-11-15 VITALS
WEIGHT: 130 LBS | DIASTOLIC BLOOD PRESSURE: 62 MMHG | HEIGHT: 64 IN | OXYGEN SATURATION: 98 % | TEMPERATURE: 97.5 F | BODY MASS INDEX: 22.2 KG/M2 | RESPIRATION RATE: 16 BRPM | SYSTOLIC BLOOD PRESSURE: 148 MMHG | HEART RATE: 64 BPM

## 2017-11-15 DIAGNOSIS — M25.512 PAIN IN LEFT SHOULDER: ICD-10-CM

## 2017-11-15 DIAGNOSIS — L03.211 CELLULITIS OF FACE: ICD-10-CM

## 2017-11-15 DIAGNOSIS — S32.402D UNSPECIFIED FRACTURE OF LEFT ACETABULUM, SUBSEQUENT ENCOUNTER FOR FRACTURE WITH ROUTINE HEALING: ICD-10-CM

## 2017-11-15 DIAGNOSIS — Z78.9 OTHER SPECIFIED HEALTH STATUS: ICD-10-CM

## 2017-11-15 DIAGNOSIS — I25.10 ATHEROSCLEROTIC HEART DISEASE OF NATIVE CORONARY ARTERY W/OUT ANGINA PECTORIS: ICD-10-CM

## 2017-11-15 DIAGNOSIS — Z95.5 PRESENCE OF CORONARY ANGIOPLASTY IMPLANT AND GRAFT: ICD-10-CM

## 2017-11-15 DIAGNOSIS — E78.2 MIXED HYPERLIPIDEMIA: ICD-10-CM

## 2017-11-15 DIAGNOSIS — Z71.89 OTHER SPECIFIED COUNSELING: ICD-10-CM

## 2017-11-15 PROCEDURE — G0506: CPT

## 2017-11-15 PROCEDURE — G0439: CPT

## 2017-11-15 PROCEDURE — 99345 HOME/RES VST NEW HIGH MDM 75: CPT | Mod: 25

## 2017-11-15 PROCEDURE — 99497 ADVNCD CARE PLAN 30 MIN: CPT | Mod: 33

## 2017-11-17 ENCOUNTER — LABORATORY RESULT (OUTPATIENT)
Age: 77
End: 2017-11-17

## 2017-11-20 LAB
25(OH)D3 SERPL-MCNC: 22 NG/ML
ALBUMIN SERPL ELPH-MCNC: 3.2 G/DL
ALP BLD-CCNC: 40 U/L
ALT SERPL-CCNC: 17 U/L
ANION GAP SERPL CALC-SCNC: 20 MMOL/L
AST SERPL-CCNC: 18 U/L
BASOPHILS # BLD AUTO: 0.06 K/UL
BASOPHILS NFR BLD AUTO: 1.1 %
BILIRUB SERPL-MCNC: 0.5 MG/DL
BUN SERPL-MCNC: 29 MG/DL
CALCIUM SERPL-MCNC: 7.6 MG/DL
CHLORIDE SERPL-SCNC: 98 MMOL/L
CHOLEST SERPL-MCNC: 132 MG/DL
CHOLEST/HDLC SERPL: 4.1 RATIO
CO2 SERPL-SCNC: 23 MMOL/L
CREAT SERPL-MCNC: 4.41 MG/DL
EOSINOPHIL # BLD AUTO: 0.24 K/UL
EOSINOPHIL NFR BLD AUTO: 4.4 %
FOLATE SERPL-MCNC: >20 NG/ML
GLUCOSE SERPL-MCNC: 235 MG/DL
HBA1C MFR BLD HPLC: 6.4 %
HCT VFR BLD CALC: 26.6 %
HDLC SERPL-MCNC: 32 MG/DL
HGB BLD-MCNC: 8.6 G/DL
IMM GRANULOCYTES NFR BLD AUTO: 0.9 %
LDLC SERPL CALC-MCNC: 80 MG/DL
LYMPHOCYTES # BLD AUTO: 1.65 K/UL
LYMPHOCYTES NFR BLD AUTO: 30.1 %
MAN DIFF?: NORMAL
MCHC RBC-ENTMCNC: 29.5 PG
MCHC RBC-ENTMCNC: 32.3 GM/DL
MCV RBC AUTO: 91.1 FL
MONOCYTES # BLD AUTO: 0.5 K/UL
MONOCYTES NFR BLD AUTO: 9.1 %
NEUTROPHILS # BLD AUTO: 2.99 K/UL
NEUTROPHILS NFR BLD AUTO: 54.4 %
PLATELET # BLD AUTO: 124 K/UL
POTASSIUM SERPL-SCNC: 4.4 MMOL/L
PROT SERPL-MCNC: 6.4 G/DL
RBC # BLD: 2.92 M/UL
RBC # FLD: 13.9 %
SODIUM SERPL-SCNC: 141 MMOL/L
TRIGL SERPL-MCNC: 102 MG/DL
TSH SERPL-ACNC: 2.83 UIU/ML
VIT B12 SERPL-MCNC: 110 PG/ML
WBC # FLD AUTO: 5.49 K/UL

## 2017-11-30 ENCOUNTER — APPOINTMENT (OUTPATIENT)
Dept: ELECTROPHYSIOLOGY | Facility: CLINIC | Age: 77
End: 2017-11-30
Payer: MEDICARE

## 2017-11-30 PROCEDURE — 99024 POSTOP FOLLOW-UP VISIT: CPT

## 2017-12-22 ENCOUNTER — MEDICATION RENEWAL (OUTPATIENT)
Age: 77
End: 2017-12-22

## 2017-12-22 DIAGNOSIS — S32.000S WEDGE COMPRESSION FRACTURE OF UNSPECIFIED LUMBAR VERTEBRA, SEQUELA: ICD-10-CM

## 2017-12-30 ENCOUNTER — CLINICAL ADVICE (OUTPATIENT)
Age: 77
End: 2017-12-30

## 2017-12-30 ENCOUNTER — MEDICATION RENEWAL (OUTPATIENT)
Age: 77
End: 2017-12-30

## 2018-01-08 DIAGNOSIS — M54.5 LOW BACK PAIN: ICD-10-CM

## 2018-01-08 DIAGNOSIS — G89.29 LOW BACK PAIN: ICD-10-CM

## 2018-01-10 ENCOUNTER — APPOINTMENT (OUTPATIENT)
Dept: HOME HEALTH SERVICES | Facility: HOME HEALTH | Age: 78
End: 2018-01-10

## 2018-01-10 VITALS
OXYGEN SATURATION: 98 % | DIASTOLIC BLOOD PRESSURE: 78 MMHG | TEMPERATURE: 98.2 F | SYSTOLIC BLOOD PRESSURE: 142 MMHG | HEART RATE: 74 BPM | RESPIRATION RATE: 16 BRPM

## 2018-01-11 ENCOUNTER — CLINICAL ADVICE (OUTPATIENT)
Age: 78
End: 2018-01-11

## 2018-01-29 ENCOUNTER — APPOINTMENT (OUTPATIENT)
Dept: ELECTROPHYSIOLOGY | Facility: CLINIC | Age: 78
End: 2018-01-29

## 2018-02-07 ENCOUNTER — APPOINTMENT (OUTPATIENT)
Dept: HOME HEALTH SERVICES | Facility: HOME HEALTH | Age: 78
End: 2018-02-07
Payer: MEDICARE

## 2018-02-07 VITALS
TEMPERATURE: 97 F | RESPIRATION RATE: 16 BRPM | WEIGHT: 130 LBS | OXYGEN SATURATION: 98 % | BODY MASS INDEX: 22.2 KG/M2 | HEIGHT: 64 IN | SYSTOLIC BLOOD PRESSURE: 118 MMHG | DIASTOLIC BLOOD PRESSURE: 60 MMHG | HEART RATE: 72 BPM

## 2018-02-07 DIAGNOSIS — I10 ESSENTIAL (PRIMARY) HYPERTENSION: ICD-10-CM

## 2018-02-07 PROCEDURE — 99349 HOME/RES VST EST MOD MDM 40: CPT

## 2018-02-07 RX ORDER — FENTANYL 12 UG/H
12 PATCH, EXTENDED RELEASE TRANSDERMAL
Qty: 5 | Refills: 0 | Status: DISCONTINUED | COMMUNITY
Start: 2018-01-08 | End: 2018-02-07

## 2018-02-23 ENCOUNTER — OTHER (OUTPATIENT)
Age: 78
End: 2018-02-23

## 2018-02-28 ENCOUNTER — APPOINTMENT (OUTPATIENT)
Dept: HOME HEALTH SERVICES | Facility: HOME HEALTH | Age: 78
End: 2018-02-28

## 2018-03-01 ENCOUNTER — OUTPATIENT (OUTPATIENT)
Dept: OUTPATIENT SERVICES | Facility: HOSPITAL | Age: 78
LOS: 1 days | End: 2018-03-01
Payer: MEDICAID

## 2018-03-01 DIAGNOSIS — Z98.89 OTHER SPECIFIED POSTPROCEDURAL STATES: Chronic | ICD-10-CM

## 2018-03-01 PROCEDURE — G9001: CPT

## 2018-03-16 ENCOUNTER — INPATIENT (INPATIENT)
Facility: HOSPITAL | Age: 78
LOS: 2 days | Discharge: HOME HEALTH | End: 2018-03-19
Attending: HOSPITALIST | Admitting: HOSPITALIST
Payer: MEDICARE

## 2018-03-16 VITALS
HEART RATE: 64 BPM | OXYGEN SATURATION: 100 % | SYSTOLIC BLOOD PRESSURE: 169 MMHG | TEMPERATURE: 98 F | WEIGHT: 123.46 LBS | RESPIRATION RATE: 16 BRPM | DIASTOLIC BLOOD PRESSURE: 70 MMHG

## 2018-03-16 DIAGNOSIS — Z98.89 OTHER SPECIFIED POSTPROCEDURAL STATES: Chronic | ICD-10-CM

## 2018-03-16 LAB
ALBUMIN SERPL ELPH-MCNC: 4 G/DL — SIGNIFICANT CHANGE UP (ref 3.3–5)
ALP SERPL-CCNC: 53 U/L — SIGNIFICANT CHANGE UP (ref 40–120)
ALT FLD-CCNC: 18 U/L — SIGNIFICANT CHANGE UP (ref 4–41)
APTT BLD: 33 SEC — SIGNIFICANT CHANGE UP (ref 27.5–37.4)
AST SERPL-CCNC: 68 U/L — HIGH (ref 4–40)
BASE EXCESS BLDV CALC-SCNC: 5.1 MMOL/L — SIGNIFICANT CHANGE UP
BASOPHILS # BLD AUTO: 0.07 K/UL — SIGNIFICANT CHANGE UP (ref 0–0.2)
BASOPHILS NFR BLD AUTO: 1.2 % — SIGNIFICANT CHANGE UP (ref 0–2)
BILIRUB SERPL-MCNC: 0.4 MG/DL — SIGNIFICANT CHANGE UP (ref 0.2–1.2)
BLOOD GAS VENOUS - CREATININE: 5 MG/DL — HIGH (ref 0.5–1.3)
BUN SERPL-MCNC: 43 MG/DL — HIGH (ref 7–23)
CALCIUM SERPL-MCNC: 7.7 MG/DL — LOW (ref 8.4–10.5)
CHLORIDE BLDV-SCNC: 95 MMOL/L — LOW (ref 96–108)
CHLORIDE SERPL-SCNC: 90 MMOL/L — LOW (ref 98–107)
CO2 SERPL-SCNC: 26 MMOL/L — SIGNIFICANT CHANGE UP (ref 22–31)
CREAT SERPL-MCNC: 5.05 MG/DL — HIGH (ref 0.5–1.3)
EOSINOPHIL # BLD AUTO: 0.31 K/UL — SIGNIFICANT CHANGE UP (ref 0–0.5)
EOSINOPHIL NFR BLD AUTO: 5.3 % — SIGNIFICANT CHANGE UP (ref 0–6)
GAS PNL BLDV: 128 MMOL/L — LOW (ref 136–146)
GLUCOSE BLDV-MCNC: 141 — HIGH (ref 70–99)
GLUCOSE SERPL-MCNC: 154 MG/DL — HIGH (ref 70–99)
HCO3 BLDV-SCNC: 27 MMOL/L — SIGNIFICANT CHANGE UP (ref 20–27)
HCT VFR BLD CALC: 42.7 % — SIGNIFICANT CHANGE UP (ref 39–50)
HCT VFR BLDV CALC: 42.9 % — SIGNIFICANT CHANGE UP (ref 39–51)
HGB BLD-MCNC: 14 G/DL — SIGNIFICANT CHANGE UP (ref 13–17)
HGB BLDV-MCNC: 14 G/DL — SIGNIFICANT CHANGE UP (ref 13–17)
IMM GRANULOCYTES # BLD AUTO: 0.01 # — SIGNIFICANT CHANGE UP
IMM GRANULOCYTES NFR BLD AUTO: 0.2 % — SIGNIFICANT CHANGE UP (ref 0–1.5)
INR BLD: 0.96 — SIGNIFICANT CHANGE UP (ref 0.88–1.17)
LACTATE BLDV-MCNC: 1.4 MMOL/L — SIGNIFICANT CHANGE UP (ref 0.5–2)
LYMPHOCYTES # BLD AUTO: 1.93 K/UL — SIGNIFICANT CHANGE UP (ref 1–3.3)
LYMPHOCYTES # BLD AUTO: 32.9 % — SIGNIFICANT CHANGE UP (ref 13–44)
MCHC RBC-ENTMCNC: 29.2 PG — SIGNIFICANT CHANGE UP (ref 27–34)
MCHC RBC-ENTMCNC: 32.8 % — SIGNIFICANT CHANGE UP (ref 32–36)
MCV RBC AUTO: 89 FL — SIGNIFICANT CHANGE UP (ref 80–100)
MONOCYTES # BLD AUTO: 0.62 K/UL — SIGNIFICANT CHANGE UP (ref 0–0.9)
MONOCYTES NFR BLD AUTO: 10.6 % — SIGNIFICANT CHANGE UP (ref 2–14)
NEUTROPHILS # BLD AUTO: 2.93 K/UL — SIGNIFICANT CHANGE UP (ref 1.8–7.4)
NEUTROPHILS NFR BLD AUTO: 49.8 % — SIGNIFICANT CHANGE UP (ref 43–77)
NRBC # FLD: 0 — SIGNIFICANT CHANGE UP
PCO2 BLDV: 55 MMHG — HIGH (ref 41–51)
PH BLDV: 7.36 PH — SIGNIFICANT CHANGE UP (ref 7.32–7.43)
PLATELET # BLD AUTO: 113 K/UL — LOW (ref 150–400)
PMV BLD: 10.7 FL — SIGNIFICANT CHANGE UP (ref 7–13)
PO2 BLDV: 30 MMHG — LOW (ref 35–40)
POTASSIUM BLDV-SCNC: SIGNIFICANT CHANGE UP MMOL/L (ref 3.4–4.5)
POTASSIUM SERPL-MCNC: SIGNIFICANT CHANGE UP MMOL/L (ref 3.5–5.3)
POTASSIUM SERPL-SCNC: SIGNIFICANT CHANGE UP MMOL/L (ref 3.5–5.3)
PROT SERPL-MCNC: 8 G/DL — SIGNIFICANT CHANGE UP (ref 6–8.3)
PROTHROM AB SERPL-ACNC: 10.7 SEC — SIGNIFICANT CHANGE UP (ref 9.8–13.1)
RBC # BLD: 4.8 M/UL — SIGNIFICANT CHANGE UP (ref 4.2–5.8)
RBC # FLD: 15 % — HIGH (ref 10.3–14.5)
SAO2 % BLDV: 49.8 % — LOW (ref 60–85)
SODIUM SERPL-SCNC: 132 MMOL/L — LOW (ref 135–145)
WBC # BLD: 5.87 K/UL — SIGNIFICANT CHANGE UP (ref 3.8–10.5)
WBC # FLD AUTO: 5.87 K/UL — SIGNIFICANT CHANGE UP (ref 3.8–10.5)

## 2018-03-16 RX ORDER — DESMOPRESSIN ACETATE 0.1 MG/1
22 TABLET ORAL ONCE
Qty: 0 | Refills: 0 | Status: DISCONTINUED | OUTPATIENT
Start: 2018-03-16 | End: 2018-03-16

## 2018-03-16 RX ORDER — DESMOPRESSIN ACETATE 0.1 MG/1
22 TABLET ORAL ONCE
Qty: 0 | Refills: 0 | Status: COMPLETED | OUTPATIENT
Start: 2018-03-16 | End: 2018-03-16

## 2018-03-16 RX ADMIN — DESMOPRESSIN ACETATE 222 MICROGRAM(S): 0.1 TABLET ORAL at 23:05

## 2018-03-16 NOTE — ED PROVIDER NOTE - OBJECTIVE STATEMENT
77M pmh DM, HTN, HLD, CAD s/p cardiac stents, dementia, ESRD on HD T/Th/Sa via LUE AVF presenting with bleeding from AVF. Patient reports he went to HD yesterday and had no issues during dialysis. Yesterday night he noticed bleeding from his LUE AVF which continued today so he came to the ED for evaluation. He has never had bleeding like this previously. Patient is on ASA and Plavix for cardiac stents. ROS negative for: fever, chest pain, SOB, Nausea, vomiting, diarrhea, abdominal pain, dysuria, L arm pain/numbness

## 2018-03-16 NOTE — ED PROVIDER NOTE - MEDICAL DECISION MAKING DETAILS
-impression: LUE AV fistula with venous bleeding from venous stick site, high intensity bruit & thrill, bleeding controlled w/ pressure. vascular said controll w/ pressure dressing  -plan: symptomatic tx w/ analgesic, labs -ddavp -reassess

## 2018-03-16 NOTE — ED CLERICAL - NS ED CLERK NOTE PRE-ARRIVAL INFORMATION; ADDITIONAL PRE-ARRIVAL INFORMATION

## 2018-03-16 NOTE — ED PROVIDER NOTE - ATTENDING CONTRIBUTION TO CARE
77M  ESRD, CAD stent asa / plavix – oozing blood from LUE AVF x 1 day.  Had dialysis yest.  Feels gen weakness, no pain at site, fistula placed in FLA.  [facility] dr.  Good thrill at aVF.  Oozing blood.  Check labs, give DDAVP, vasc eval, reassess.  If unable to control bleeding would admit for further workup.  VS:  unremarkable    GEN - NAD; well appearing; A+O x3   HEAD - NC/AT     ENT - PEERL, EOMI, mucous membranes  moist , no discharge      NECK: Neck supple, non-tender without lymphadenopathy, no masses, no JVD  PULM - CTA b/l,  symmetric breath sounds  COR -  normal heart sounds  (+)murmur  ABD - , ND, NT, soft, no guarding, no rebound, no masses    BACK - no CVA tenderness, nontender spine     EXTREMS - no edema, no deformity, warm and well perfused  LUE (+)AVF (+)swollen AVF, skin wnl except pinpoint source of bleeding at lower end near AC fossa - oozing, not pumping.  fingerpoint pressure applied.  SKIN - no rash or bruising      NEUROLOGIC - alert, CN 2-12 intact, sensation nl, motor 5/5 RUE/LUE/RLE/LLE.

## 2018-03-16 NOTE — ED PROVIDER NOTE - PHYSICAL EXAMINATION
*GEN - NAD; well appearing;   *HEAD - NC/AT   *EYES/NOSE - PERRL & EOMI b/l  *THROAT: airway patent, moist mucus membranes  *NECK: Neck supple, no masses  *PULMONARY - CTA b/l, symmetric breath sounds.   *CARDIAC -s1s2, RRR, + Murmur  *ABDOMEN -  ND, NT, soft, no guarding, no rebound, no masses   *BACK - no CVA tenderness, Normal  spine   *EXTREMITIES - symmetric pulses, 2+ dp & radial, capillary refill < 2 seconds, no cyanosis, no edema: LUE AV fistula with venous bleeding from venous stick site, high intensity bruit & thrill, bleeding controlled w/ pressure  *SKIN - no rash or bruising   *NEUROLOGIC - alert, CN 2-12 intact, moves all 4 extremeties, normal gait  *PSYCH - insight and judgment nl, memory nl, affect nl, thought nl

## 2018-03-16 NOTE — ED ADULT NURSE NOTE - PMH
CAD (coronary artery disease)  s/p 1 stent  Dementia    DM (diabetes mellitus)    ESRD (end stage renal disease)    HLD (hyperlipidemia)    HTN (hypertension)    PVD (peripheral vascular disease)    Thrombocytopenia

## 2018-03-16 NOTE — ED ADULT TRIAGE NOTE - CHIEF COMPLAINT QUOTE
Pt brought in by EMS, c/o bleeding from L AV Fistula since this morning, pt states he gets dialysis T/TH/S. Last treatment yesterday with fisutula working appropriately. Minor bleeding noted to site, positive bruit. Pt denies any trauma to area/ha/dizziness/chest pain. Pt on Plavix and baby aspirin.

## 2018-03-16 NOTE — CONSULT NOTE ADULT - ASSESSMENT
77M with ESRD on HD via LUE AVF with bleeding from needle/access site for HD yesterday  -Pressure applied and bleeding stopped. Dressing applied.  -if bleeding re-occurs can consider DDAVP and holding prolonged pressure  -f/u labs, cornelio H&H  -no acute vascular surgery intervention  -patient can follow up with Dr. Ibrahim as needed (patient does not know who his vascular surgeon is)  -d/w vascular surgery fellow on behalf of Dr. Ibrahim 77M with ESRD on HD via LUE AVF with bleeding from needle/access site for HD yesterday  -Pressure applied and bleeding stopped. Dressing applied.  -if bleeding re-occurs consider holding prolonged pressure  -f/u labs, cornelio H&H  -no acute vascular surgery intervention  -if patient is being discharged from ED, recommend dose of DDAVP prior to discharge   -patient can follow up with Dr. Ibrahim as needed (patient does not know who his vascular surgeon is)  -d/w vascular surgery fellow on behalf of Dr. Ibrahim

## 2018-03-16 NOTE — ED ADULT NURSE NOTE - OBJECTIVE STATEMENT
BIBEMS complaining of L UA AV fistula bleeding since this morning. Dialysis patient T/Th/S, had full treatment yesterday without residual bleeding to the sight. NAD noted at this time, wife at bedside who is much more alarmed than he is. Denies this happening before, (+) bruits/thrills. SMALL amount of blood at the site, dressing changed, site is barely oozing. Dressing changed. VSS. Waiting to be seen by provider. Patient takes baby ASA and plavix daily. Safety maintained, needs attended, will continue to monitor.

## 2018-03-16 NOTE — ED PROVIDER NOTE - PROGRESS NOTE DETAILS
hernan: vascular res Juli Jane applied pressure dressing, bleeding stopped but when I dc dressing oozing restarted, I will place surgicel hernan: with dressing bleeding controlled, but when I dc surgicell, bleeding restarted. surg still refusing to take admission or place stitch & rec adm to hospitilist hernan; pts pmd rocco villanueva is a physician partner so will adm to hospitilist

## 2018-03-16 NOTE — CONSULT NOTE ADULT - SUBJECTIVE AND OBJECTIVE BOX
VASCULAR SURGERY  Consulting attending: Dr. Ibrahim    HPI: 77M with DM, HTN, HLD, CAD s/p cardiac stents, dementia, ESRD on HD T/Th/Sa via LUE AVF presenting with bleeding from AVF. Patient reports he went to HD yesterday and had no issues during dialysis. Yesterday night he noticed bleeding from his LUE AVF which continued today so he came to the ED for evaluation. He has never had bleeding like this previously. Patient is on ASA and Plavix for cardiac stents.      PAST MEDICAL HISTORY:  Dementia  Thrombocytopenia  PVD (peripheral vascular disease)  GERD (gastroesophageal reflux disease)  CAD (coronary artery disease)  HLD (hyperlipidemia)  HTN (hypertension)  DM (diabetes mellitus)  ESRD (end stage renal disease)      PAST SURGICAL HISTORY:  H/O foot surgery  S/P cataract extraction  S/P coronary artery stent placement  S/P cholecystectomy  AVF (arteriovenous fistula)      MEDICATIONS:  desmopressin IVPB 22 MICROGram(s) IV Intermittent Once      ALLERGIES:  No Known Allergies      VITALS & I/Os:  Vital Signs Last 24 Hrs  T(C): 36.7 (16 Mar 2018 20:34), Max: 36.7 (16 Mar 2018 19:43)  T(F): 98.1 (16 Mar 2018 20:34), Max: 98.1 (16 Mar 2018 19:43)  HR: 66 (16 Mar 2018 20:34) (64 - 66)  BP: 161/69 (16 Mar 2018 20:34) (161/69 - 169/70)  BP(mean): --  RR: 16 (16 Mar 2018 20:34) (16 - 16)  SpO2: 100% (16 Mar 2018 20:34) (100% - 100%)    PHYSICAL EXAM:  General: No acute distress  Respiratory: Nonlabored  Cardiovascular: Regular rate and rhythm.   Abdominal: Soft, nondistended, nontender. No rebound or guarding. No organomegaly, no palpable mass.  Extremities: Warm. LUE brachiocephalic AVF with thrill, aneurysmal. Punctate area of slow bleeding likely from needle puncture for HD yesterday.    LABS:  pending

## 2018-03-17 DIAGNOSIS — I77.0 ARTERIOVENOUS FISTULA, ACQUIRED: ICD-10-CM

## 2018-03-17 DIAGNOSIS — E11.9 TYPE 2 DIABETES MELLITUS WITHOUT COMPLICATIONS: ICD-10-CM

## 2018-03-17 DIAGNOSIS — I25.10 ATHEROSCLEROTIC HEART DISEASE OF NATIVE CORONARY ARTERY WITHOUT ANGINA PECTORIS: ICD-10-CM

## 2018-03-17 DIAGNOSIS — I10 ESSENTIAL (PRIMARY) HYPERTENSION: ICD-10-CM

## 2018-03-17 DIAGNOSIS — E11.22 TYPE 2 DIABETES MELLITUS WITH DIABETIC CHRONIC KIDNEY DISEASE: ICD-10-CM

## 2018-03-17 DIAGNOSIS — D63.1 ANEMIA IN CHRONIC KIDNEY DISEASE: ICD-10-CM

## 2018-03-17 DIAGNOSIS — Z79.899 OTHER LONG TERM (CURRENT) DRUG THERAPY: ICD-10-CM

## 2018-03-17 DIAGNOSIS — R58 HEMORRHAGE, NOT ELSEWHERE CLASSIFIED: ICD-10-CM

## 2018-03-17 DIAGNOSIS — N18.6 END STAGE RENAL DISEASE: ICD-10-CM

## 2018-03-17 DIAGNOSIS — E83.51 HYPOCALCEMIA: ICD-10-CM

## 2018-03-17 DIAGNOSIS — E83.39 OTHER DISORDERS OF PHOSPHORUS METABOLISM: ICD-10-CM

## 2018-03-17 DIAGNOSIS — Z29.9 ENCOUNTER FOR PROPHYLACTIC MEASURES, UNSPECIFIED: ICD-10-CM

## 2018-03-17 DIAGNOSIS — E53.8 DEFICIENCY OF OTHER SPECIFIED B GROUP VITAMINS: ICD-10-CM

## 2018-03-17 DIAGNOSIS — I12.0 HYPERTENSIVE CHRONIC KIDNEY DISEASE WITH STAGE 5 CHRONIC KIDNEY DISEASE OR END STAGE RENAL DISEASE: ICD-10-CM

## 2018-03-17 LAB
BUN SERPL-MCNC: 42 MG/DL — HIGH (ref 7–23)
CALCIUM SERPL-MCNC: 7.1 MG/DL — LOW (ref 8.4–10.5)
CHLORIDE SERPL-SCNC: 92 MMOL/L — LOW (ref 98–107)
CO2 SERPL-SCNC: 27 MMOL/L — SIGNIFICANT CHANGE UP (ref 22–31)
CREAT SERPL-MCNC: 5.3 MG/DL — HIGH (ref 0.5–1.3)
GLUCOSE SERPL-MCNC: 174 MG/DL — HIGH (ref 70–99)
POTASSIUM SERPL-MCNC: 4.3 MMOL/L — SIGNIFICANT CHANGE UP (ref 3.5–5.3)
POTASSIUM SERPL-SCNC: 4.3 MMOL/L — SIGNIFICANT CHANGE UP (ref 3.5–5.3)
SODIUM SERPL-SCNC: 134 MMOL/L — LOW (ref 135–145)

## 2018-03-17 PROCEDURE — 99223 1ST HOSP IP/OBS HIGH 75: CPT | Mod: GC

## 2018-03-17 PROCEDURE — 12345: CPT | Mod: GC,NC

## 2018-03-17 RX ORDER — NIFEDIPINE 30 MG
30 TABLET, EXTENDED RELEASE 24 HR ORAL DAILY
Qty: 0 | Refills: 0 | Status: DISCONTINUED | OUTPATIENT
Start: 2018-03-17 | End: 2018-03-18

## 2018-03-17 RX ORDER — DEXTROSE 50 % IN WATER 50 %
1 SYRINGE (ML) INTRAVENOUS ONCE
Qty: 0 | Refills: 0 | Status: DISCONTINUED | OUTPATIENT
Start: 2018-03-17 | End: 2018-03-19

## 2018-03-17 RX ORDER — GLUCAGON INJECTION, SOLUTION 0.5 MG/.1ML
1 INJECTION, SOLUTION SUBCUTANEOUS ONCE
Qty: 0 | Refills: 0 | Status: DISCONTINUED | OUTPATIENT
Start: 2018-03-17 | End: 2018-03-19

## 2018-03-17 RX ORDER — INSULIN LISPRO 100/ML
VIAL (ML) SUBCUTANEOUS
Qty: 0 | Refills: 0 | Status: DISCONTINUED | OUTPATIENT
Start: 2018-03-17 | End: 2018-03-19

## 2018-03-17 RX ORDER — NIFEDIPINE 30 MG
90 TABLET, EXTENDED RELEASE 24 HR ORAL AT BEDTIME
Qty: 0 | Refills: 0 | Status: DISCONTINUED | OUTPATIENT
Start: 2018-03-17 | End: 2018-03-18

## 2018-03-17 RX ORDER — CLOPIDOGREL BISULFATE 75 MG/1
75 TABLET, FILM COATED ORAL DAILY
Qty: 0 | Refills: 0 | Status: DISCONTINUED | OUTPATIENT
Start: 2018-03-17 | End: 2018-03-19

## 2018-03-17 RX ORDER — HEPARIN SODIUM 5000 [USP'U]/ML
5000 INJECTION INTRAVENOUS; SUBCUTANEOUS EVERY 8 HOURS
Qty: 0 | Refills: 0 | Status: DISCONTINUED | OUTPATIENT
Start: 2018-03-17 | End: 2018-03-17

## 2018-03-17 RX ORDER — ASPIRIN/CALCIUM CARB/MAGNESIUM 324 MG
81 TABLET ORAL DAILY
Qty: 0 | Refills: 0 | Status: DISCONTINUED | OUTPATIENT
Start: 2018-03-17 | End: 2018-03-19

## 2018-03-17 RX ORDER — SODIUM CHLORIDE 9 MG/ML
1000 INJECTION, SOLUTION INTRAVENOUS
Qty: 0 | Refills: 0 | Status: DISCONTINUED | OUTPATIENT
Start: 2018-03-17 | End: 2018-03-19

## 2018-03-17 RX ORDER — PREGABALIN 225 MG/1
1000 CAPSULE ORAL DAILY
Qty: 0 | Refills: 0 | Status: DISCONTINUED | OUTPATIENT
Start: 2018-03-17 | End: 2018-03-19

## 2018-03-17 RX ORDER — DEXTROSE 50 % IN WATER 50 %
25 SYRINGE (ML) INTRAVENOUS ONCE
Qty: 0 | Refills: 0 | Status: DISCONTINUED | OUTPATIENT
Start: 2018-03-17 | End: 2018-03-19

## 2018-03-17 RX ORDER — PANTOPRAZOLE SODIUM 20 MG/1
40 TABLET, DELAYED RELEASE ORAL
Qty: 0 | Refills: 0 | Status: DISCONTINUED | OUTPATIENT
Start: 2018-03-17 | End: 2018-03-19

## 2018-03-17 RX ORDER — INSULIN LISPRO 100/ML
VIAL (ML) SUBCUTANEOUS AT BEDTIME
Qty: 0 | Refills: 0 | Status: DISCONTINUED | OUTPATIENT
Start: 2018-03-17 | End: 2018-03-19

## 2018-03-17 RX ORDER — LOSARTAN POTASSIUM 100 MG/1
100 TABLET, FILM COATED ORAL DAILY
Qty: 0 | Refills: 0 | Status: DISCONTINUED | OUTPATIENT
Start: 2018-03-17 | End: 2018-03-19

## 2018-03-17 RX ORDER — METOPROLOL TARTRATE 50 MG
100 TABLET ORAL DAILY
Qty: 0 | Refills: 0 | Status: DISCONTINUED | OUTPATIENT
Start: 2018-03-17 | End: 2018-03-19

## 2018-03-17 RX ORDER — DEXTROSE 50 % IN WATER 50 %
12.5 SYRINGE (ML) INTRAVENOUS ONCE
Qty: 0 | Refills: 0 | Status: DISCONTINUED | OUTPATIENT
Start: 2018-03-17 | End: 2018-03-19

## 2018-03-17 RX ORDER — CALCIUM ACETATE 667 MG
667 TABLET ORAL
Qty: 0 | Refills: 0 | Status: DISCONTINUED | OUTPATIENT
Start: 2018-03-17 | End: 2018-03-18

## 2018-03-17 RX ADMIN — LOSARTAN POTASSIUM 100 MILLIGRAM(S): 100 TABLET, FILM COATED ORAL at 06:30

## 2018-03-17 RX ADMIN — PANTOPRAZOLE SODIUM 40 MILLIGRAM(S): 20 TABLET, DELAYED RELEASE ORAL at 06:30

## 2018-03-17 RX ADMIN — Medication 1: at 09:19

## 2018-03-17 RX ADMIN — Medication 667 MILLIGRAM(S): at 18:20

## 2018-03-17 RX ADMIN — Medication 100 MILLIGRAM(S): at 06:30

## 2018-03-17 RX ADMIN — Medication 2: at 18:20

## 2018-03-17 RX ADMIN — Medication 81 MILLIGRAM(S): at 13:27

## 2018-03-17 RX ADMIN — Medication 667 MILLIGRAM(S): at 13:27

## 2018-03-17 RX ADMIN — Medication 667 MILLIGRAM(S): at 09:18

## 2018-03-17 RX ADMIN — CLOPIDOGREL BISULFATE 75 MILLIGRAM(S): 75 TABLET, FILM COATED ORAL at 13:28

## 2018-03-17 RX ADMIN — Medication 30 MILLIGRAM(S): at 06:30

## 2018-03-17 RX ADMIN — PREGABALIN 1000 MICROGRAM(S): 225 CAPSULE ORAL at 14:49

## 2018-03-17 NOTE — CONSULT NOTE ADULT - PROBLEM SELECTOR RECOMMENDATION 5
a/w AVF bleeding. Pt seen by Vascular, no surgical interventions needed. Compression bandage placed and not active bleeding. Monitor CBC.

## 2018-03-17 NOTE — PROGRESS NOTE ADULT - PROBLEM SELECTOR PLAN 4
-with stent put in 5 months ago. Continue with dual antiplatelet therapy. C/w metoprolol, losartan. Unclear why pt off statin, would obtain collateral information.

## 2018-03-17 NOTE — PROGRESS NOTE ADULT - PROBLEM SELECTOR PLAN 3
-Ca of 7.1, (albumin = 4) - likely 2/2 to CKD. Will check ionized ca, vit d level  -start on calcium acetate  -continue to monitor

## 2018-03-17 NOTE — H&P ADULT - HISTORY OF PRESENT ILLNESS
77M pmh T2DM, HTN, HLD, CAD s/p cardiac stents ~5 month on Plavix, ESRD on HD T/Th/Sa via LUE AVF, dementia presents to the ED with bleeding from fistula site. Most of history obtained via wife as per pt preference. Wife reports pt went to regularly scheduled HD on Thursday without issues. Today, notes to have active oozing of bright red blood from the fistula site. Pt is on aspirin and plavix. Denies pain from the site or signs of infection. Denies prior episodes of bleeding. Denies fevers, chills, night sweats, chest pain, changes in vision, shortness of breath or LE edema.   In the ED Vital Signs Last 24 Hrs  T(C): 36.4 (17 Mar 2018 04:35), Max: 36.7 (16 Mar 2018 19:43)  T(F): 97.5 (17 Mar 2018 04:35), Max: 98.1 (16 Mar 2018 19:43)  HR: 64 (17 Mar 2018 04:35) (61 - 68)  BP: 202/84 (17 Mar 2018 04:35) (154/66 - 202/84)  BP(mean): --  RR: 18 (17 Mar 2018 04:35) (16 - 18)  SpO2: 100% (17 Mar 2018 04:35) (100% - 100%)  Vascular surgery was consulted. Pressure dressing was applied with hemostasis achieved.

## 2018-03-17 NOTE — H&P ADULT - PROBLEM SELECTOR PLAN 6
-history of resistant hypertension  -hypertensive without signs of end organ damage  -restart metoprolol, losartan and nifedipine  -avoid dropping blood pressure too quickly -on oral meds, will hold in inpatient setting  -low dose ISS, monitor FS -last HgbA1c level = 5/7 in 10/2017  -on oral meds, will hold in inpatient setting  -low dose ISS, monitor FS  -f/u HgbA1c level in the AM  -consistent carb diet

## 2018-03-17 NOTE — PROGRESS NOTE ADULT - PROBLEM SELECTOR PLAN 5
-Patient should be on atorvastatin given recent stenting  -obtain collateral if any reason why not on same. If no contraindication, restart statin

## 2018-03-17 NOTE — H&P ADULT - PROBLEM SELECTOR PLAN 2
-on a Tu, Thu, Sat schedule. Next due for HD Saturday. Call renal in AM for HD prior to discharge, make sure fistula doesn't rebleed  -electrolytes stable  -avoid nephrotoxins

## 2018-03-17 NOTE — CONSULT NOTE ADULT - SUBJECTIVE AND OBJECTIVE BOX
[facility] NEPHROLOGY- CONSULTATION NOTE    Patient is a 76yo Male with ESRD on HD (TTS @ [facility]; Nephrologist Dr. Romano) via LUE AVF (placed 5 yrs ago), DM-2, HTN, HLD, CAD s/p cardiac stents ~5 month on Plavix, presents to the ED with bleeding from fistula site. Pt poor historian, history taken from wife.   Pt went to [facility] on Monday and had ballooning performed of the AVF and prox stenosis ?subclavian. Pt had no bleeding even post HD Thursday but developed spontanous bleeding from AVF yesterday. As per wife, pt very weak for the past 1 week. Denies any SOB, cough, fever, chills or chest pain.   Pt seen by Vascular in ER and compressive bandaged placed with no active signs of bleeding.   Renal consulted for ESRD status.       PAST MEDICAL & SURGICAL HISTORY:  Dementia  Thrombocytopenia  PVD (peripheral vascular disease)  CAD (coronary artery disease): s/p 1 stent  HLD (hyperlipidemia)  HTN (hypertension)  DM (diabetes mellitus)  ESRD (end stage renal disease)  H/O foot surgery: sec to ankle fracture  S/P cataract extraction  S/P coronary artery stent placement  S/P cholecystectomy  AVF (arteriovenous fistula)    No Known Allergies    Home Medications Reviewed  Hospital Medications:   MEDICATIONS  (STANDING):  aspirin enteric coated 81 milliGRAM(s) Oral daily  calcium acetate 667 milliGRAM(s) Oral three times a day with meals  clopidogrel Tablet 75 milliGRAM(s) Oral daily  cyanocobalamin Injectable 1000 MICROGram(s) IntraMuscular daily  dextrose 5%. 1000 milliLiter(s) (50 mL/Hr) IV Continuous <Continuous>  dextrose 50% Injectable 12.5 Gram(s) IV Push once  dextrose 50% Injectable 25 Gram(s) IV Push once  dextrose 50% Injectable 25 Gram(s) IV Push once  insulin lispro (HumaLOG) corrective regimen sliding scale   SubCutaneous three times a day before meals  insulin lispro (HumaLOG) corrective regimen sliding scale   SubCutaneous at bedtime  losartan 100 milliGRAM(s) Oral daily  metoprolol succinate  milliGRAM(s) Oral daily  NIFEdipine XL 30 milliGRAM(s) Oral daily  NIFEdipine XL 90 milliGRAM(s) Oral at bedtime  pantoprazole    Tablet 40 milliGRAM(s) Oral before breakfast        REVIEW OF SYSTEMS:  Gen: no changes in weight, +fatigue  HEENT: no rhinorrhea  Neck: no sore throat  Cards: no chest pain  Resp: no dyspnea  GI: no nausea or vomiting or diarrhea  : no dysuria or hematuria  Vascular: no LE edema, + AVF bleeding- subsided  Derm: no rashes  Neuro: no numbness/tingling  All other review of systems is negative unless indicated above.    VITALS:  T(F): 97.5 (03-17-18 @ 04:35), Max: 98.1 (03-16-18 @ 19:43)  HR: 65 (03-17-18 @ 06:27)  BP: 214/86 (03-17-18 @ 06:27)  RR: 18 (03-17-18 @ 04:35)  SpO2: 100% (03-17-18 @ 04:35)  Wt(kg): --    03-16 @ 07:01  -  03-17 @ 07:00  --------------------------------------------------------  IN: 50 mL / OUT: 0 mL / NET: 50 mL      Height (cm): 157.48 (03-17 @ 04:35)  Weight (kg): 52.1 (03-17 @ 04:35)  BMI (kg/m2): 21 (03-17 @ 04:35)  BSA (m2): 1.51 (03-17 @ 04:35)    PHYSICAL EXAM:  Gen: NAD, calm  HEENT: MMM  Neck: no JVD  Cards: RRR, +S1/S2, no M/G/R  Resp: decreased BS at bases  GI: soft, NT/ND, NABS  : no CVA tenderness  Extremities: no LE edema B/L  Access: Left AV- compression bandage (no active bleeding)    LABS:  03-17    134<L>  |  92<L>  |  42<H>  ----------------------------<  174<H>  4.3   |  27  |  5.30<H>    Ca    7.1<L>      17 Mar 2018 00:44    TPro  8.0  /  Alb  4.0  /  TBili  0.4  /  DBili      /  AST  68<H>  /  ALT  18  /  AlkPhos  53  03-16    Creatinine Trend: 5.30 <--, 5.05 <--                        14.0   5.87  )-----------( 113      ( 16 Mar 2018 22:08 )             42.7     Urine Studies:      RADIOLOGY & ADDITIONAL STUDIES:

## 2018-03-17 NOTE — CONSULT NOTE ADULT - ATTENDING COMMENTS
[facility] NEPHROLOGY  Ihsan Saba M.D.  Eduardo Romano D.O.  Loni Ribeiro M.D.  Maris Ramirez, MSN, ANP-C  [phone]    [address] [place] [address]

## 2018-03-17 NOTE — H&P ADULT - PROBLEM SELECTOR PLAN 5
-on oral meds, will hold in inpatient setting  -low dose ISS, monitor FS -Patient should be on atorvastatin given recent stenting  -obtain collateral. If no contraindication, restart statin -Patient should be on atorvastatin given recent stenting  -obtain collateral if any reason why not on same. If no contraindication, restart statin

## 2018-03-17 NOTE — H&P ADULT - NSHPPHYSICALEXAM_GEN_ALL_CORE
GENERAL APPEARANCE: Well developed, well nourished, alert and cooperative. NAD.   HEENT:  refused  NECK: Neck supple, non-tender without lymphadenopathy, masses or thyromegaly.  CARDIAC: Normal S1 and S2. No S3. +s4 +systolic ejection murmur. Rhythm is regular.  LUNGS: Clear to auscultation and percussion without rales, rhonchi, wheezing or diminished breath sounds.  ABDOMEN: Positive bowel sounds. Soft, nondistended, nontender. No guarding or rebound.   MUSKULOSKELETAL: ROM intact spine and extremities. No joint erythema or tenderness.   BACK: no spinal deformity, symmetry of spinal muscles, without tenderness, decreased range of motion.  EXTREMITIES: No significant deformity or joint abnormality. No edema. Peripheral pulses intact. No varicosities.  NEUROLOGICAL: CN II-XII intact. Strength and sensation symmetric and intact throughout.   SKIN: wrapped left arm fistula   PSYCHIATRIC: AOx2.Normal affect and behavior.

## 2018-03-17 NOTE — H&P ADULT - ASSESSMENT
77M pmh T2DM, HTN, HLD, CAD s/p cardiac stents ~5 month on Plavix, ESRD on HD T/Th/Sa via LUE AVF, dementia presents to the ED with bleeding from fistula site likely 2/2 to uremia and dual antiplatelet therapy

## 2018-03-17 NOTE — H&P ADULT - NSHPLABSRESULTS_GEN_ALL_CORE
(03-16 @ 22:08)                      14.0  5.87 )-----------( 113                 42.7    Neutrophils = 2.93 (49.8%)  Lymphocytes = 1.93 (32.9%)  Eosinophils = 0.31 (5.3%)  Basophils = 0.07 (1.2%)  Monocytes = 0.62 (10.6%)  Bands = --%    03-17    134<L>  |  92<L>  |  42<H>  ----------------------------<  174<H>  4.3   |  27  |  5.30<H>    Ca    7.1<L>      17 Mar 2018 00:44    TPro  8.0  /  Alb  4.0  /  TBili  0.4  /  DBili  x   /  AST  68<H>  /  ALT  18  /  AlkPhos  53  03-16    ( 16 Mar 2018 22:08 )   PT: 10.7 SEC;   INR: 0.96 ;       PTT:33.0 SEC      Venous Blood Gas:  03-16 @ 22:08  7.36/55/30/27/49.8  VBG Lactate: 1.4        Labs reviewed. Hgb 14  Electrolytes stable  Ca 7.1  BUN 42 Cr 5.30  VBG unremarkable

## 2018-03-17 NOTE — CONSULT NOTE ADULT - ASSESSMENT
Patient is a 76yo Male with ESRD on HD presents to the ED with bleeding from fistula site. Renal consulted for ESRD status.

## 2018-03-17 NOTE — H&P ADULT - PROBLEM SELECTOR PLAN 4
-with stent put in 5 months ago. Continue with dual antiplatelet therapy. C/w metoprolol, losartan. Unclear why pt off statin, would obtain collateral -with stent put in 5 months ago. Continue with dual antiplatelet therapy. C/w metoprolol, losartan. Unclear why pt off statin, would obtain collateral information (please follow-up)

## 2018-03-17 NOTE — H&P ADULT - PROBLEM SELECTOR PLAN 1
-bleeding from fistula site in the setting of dual antiplatelet therapy and uremia. S/p ddavp  -hemostasis achieved with pressure dressing  -VS stable, Hgb stable  -monitor for signs of rebleeding  -vascular surgery follow up -bleeding from left arm AV fistula site in the setting of dual antiplatelet therapy (plavix, aspirin) and uremia. S/P ddavp  -seen by Vascular surgery team (appreciated)  -hemostasis achieved with pressure dressing  -VS stable, Hgb stable  -monitor for signs of rebleeding  -vascular surgery follow up

## 2018-03-17 NOTE — PROGRESS NOTE ADULT - SUBJECTIVE AND OBJECTIVE BOX
Patient is a 77y old  Male who presents with a chief complaint of Bleeding from LUE (arm) AV fistula site (17 Mar 2018 05:57)      OVERNIGHT EVENTS: No acute events overnight.  SUBJECTIVE: Patient seen and examined at bedside. Denies CP, SOB, abdominal pain/N/V.       MEDICATIONS  (STANDING):  aspirin enteric coated 81 milliGRAM(s) Oral daily  calcium acetate 667 milliGRAM(s) Oral three times a day with meals  clopidogrel Tablet 75 milliGRAM(s) Oral daily  cyanocobalamin Injectable 1000 MICROGram(s) IntraMuscular daily  dextrose 5%. 1000 milliLiter(s) (50 mL/Hr) IV Continuous <Continuous>  dextrose 50% Injectable 12.5 Gram(s) IV Push once  dextrose 50% Injectable 25 Gram(s) IV Push once  dextrose 50% Injectable 25 Gram(s) IV Push once  insulin lispro (HumaLOG) corrective regimen sliding scale   SubCutaneous three times a day before meals  insulin lispro (HumaLOG) corrective regimen sliding scale   SubCutaneous at bedtime  losartan 100 milliGRAM(s) Oral daily  metoprolol succinate  milliGRAM(s) Oral daily  NIFEdipine XL 30 milliGRAM(s) Oral daily  NIFEdipine XL 90 milliGRAM(s) Oral at bedtime  pantoprazole    Tablet 40 milliGRAM(s) Oral before breakfast    MEDICATIONS  (PRN):  dextrose Gel 1 Dose(s) Oral once PRN Blood Glucose LESS THAN 70 milliGRAM(s)/deciliter  glucagon  Injectable 1 milliGRAM(s) IntraMuscular once PRN Glucose LESS THAN 70 milligrams/deciliter      T(C): 36.4 (03-17-18 @ 04:35), Max: 36.7 (03-16-18 @ 19:43)  HR: 65 (03-17-18 @ 06:27) (61 - 68)  BP: 214/86 (03-17-18 @ 06:27) (154/66 - 214/86)  RR: 18 (03-17-18 @ 04:35) (16 - 18)  SpO2: 100% (03-17-18 @ 04:35) (100% - 100%)  CAPILLARY BLOOD GLUCOSE      POCT Blood Glucose.: 153 mg/dL (17 Mar 2018 08:22)    I&O's Summary    16 Mar 2018 07:01  -  17 Mar 2018 07:00  --------------------------------------------------------  IN: 50 mL / OUT: 0 mL / NET: 50 mL        PHYSICAL EXAM  GENERAL: NAD, well-developed  HEAD:  Atraumatic, Normocephalic  EYES: EOMI, PERRLA, conjunctiva and sclera clear  CHEST/LUNG: Clear to auscultation bilaterally; No wheeze  HEART: +S1 +S2, Regular rate and rhythm  ABDOMEN: Soft, Nontender, Nondistended; Bowel sounds present  NEURO: No focal neurologic deficits, sensation and motor function grossly intact.   EXTREMITIES:  Pink and warm, Peripheral Pulses intact. TIMO SANDERSF wrapped in dressing, C/D/I.       LABS:                        14.0   5.87  )-----------( 113      ( 16 Mar 2018 22:08 )             42.7     03-17    134<L>  |  92<L>  |  42<H>  ----------------------------<  174<H>  4.3   |  27  |  5.30<H>    Ca    7.1<L>      17 Mar 2018 00:44    TPro  8.0  /  Alb  4.0  /  TBili  0.4  /  DBili  x   /  AST  68<H>  /  ALT  18  /  AlkPhos  53  03-16    PT/INR - ( 16 Mar 2018 22:08 )   PT: 10.7 SEC;   INR: 0.96          PTT - ( 16 Mar 2018 22:08 )  PTT:33.0 SEC          RADIOLOGY & ADDITIONAL TESTS:    Imaging Personally Reviewed:  Consultant(s) Notes Reviewed:    Care Discussed with Consultants/Other Providers:

## 2018-03-17 NOTE — H&P ADULT - PROBLEM SELECTOR PLAN 8
SCDs given bleeding risk -Vitamin B12 = 123 in 10/2017  -with history of dementia and A&O x 2 at baseline  -start supplementation; suggest changing to sublingual formulary upon discharge to enhance absorption

## 2018-03-17 NOTE — H&P ADULT - NSHPREVIEWOFSYSTEMS_GEN_ALL_CORE
CONSTITUTIONAL:  +weakness No weight loss, fever, chills  HEENT:  Eyes:  No visual loss, blurred vision, double vision or yellow sclerae. Ears, Nose, Throat:  No hearing loss, sneezing, congestion, runny nose or sore throat.  SKIN:  No rash or itching.  CARDIOVASCULAR:  No chest pain, chest pressure or chest discomfort. No palpitations or edema.  RESPIRATORY:  No shortness of breath, cough or sputum.  GASTROINTESTINAL:  No anorexia, nausea, vomiting or diarrhea. No abdominal pain or blood.  GENITOURINARY:  Denies hematuria, dysuria.   NEUROLOGICAL:  No headache, dizziness, syncope, paralysis, ataxia, numbness or tingling in the extremities. No change in bowel or bladder control.  MUSCULOSKELETAL:  No muscle, back pain, joint pain or stiffness.  HEMATOLOGIC: +bleeding from fistula site  LYMPHATICS:  No enlarged nodes. No history of splenectomy.  PSYCHIATRIC:  No history of depression or anxiety.  ENDOCRINOLOGIC:  No reports of sweating, cold or heat intolerance. No polyuria or polydipsia.  ALLERGIES:  No history of asthma, hives, eczema or rhinitis.

## 2018-03-17 NOTE — H&P ADULT - PROBLEM SELECTOR PLAN 3
-Ca of 7.1, likely 2/2 to CKD. Will replete  -continue to monitor -Ca of 7.1, likely 2/2 to CKD. Will check ionized ca, vit d levels  -start on calcium acetate  -continue to monitor -Ca of 7.1, (albumin = 4) - likely 2/2 to CKD. Will check ionized ca, vit d level  -start on calcium acetate  -continue to monitor

## 2018-03-17 NOTE — H&P ADULT - PROBLEM SELECTOR PROBLEM 6
HTN (hypertension) DM (diabetes mellitus) Type 2 diabetes mellitus with chronic kidney disease on chronic dialysis, without long-term current use of insulin

## 2018-03-17 NOTE — PROGRESS NOTE ADULT - PROBLEM SELECTOR PLAN 6
-last HgbA1c level = 5/7 in 10/2017  -on oral meds, will hold in inpatient setting  -low dose ISS, monitor FS  -f/u HgbA1c level in the AM  -consistent carb diet -last HgbA1c level = 5.7 in 10/2017  -on oral meds, will hold in inpatient setting  -low dose ISS, monitor FS  -f/u HgbA1c level in the AM  -consistent carb diet

## 2018-03-17 NOTE — PROGRESS NOTE ADULT - PROBLEM SELECTOR PLAN 9
SCDs given bleeding risk  Ambulate as tolerated      Jade Wolff D.O.  Medicine Intern  pager [phone]

## 2018-03-18 LAB
BUN SERPL-MCNC: 20 MG/DL — SIGNIFICANT CHANGE UP (ref 7–23)
CALCIUM SERPL-MCNC: 8.2 MG/DL — LOW (ref 8.4–10.5)
CHLORIDE SERPL-SCNC: 97 MMOL/L — LOW (ref 98–107)
CO2 SERPL-SCNC: 29 MMOL/L — SIGNIFICANT CHANGE UP (ref 22–31)
CREAT SERPL-MCNC: 3.35 MG/DL — HIGH (ref 0.5–1.3)
GLUCOSE SERPL-MCNC: 106 MG/DL — HIGH (ref 70–99)
MAGNESIUM SERPL-MCNC: 2 MG/DL — SIGNIFICANT CHANGE UP (ref 1.6–2.6)
PHOSPHATE SERPL-MCNC: 2.2 MG/DL — LOW (ref 2.5–4.5)
POTASSIUM SERPL-MCNC: 4.1 MMOL/L — SIGNIFICANT CHANGE UP (ref 3.5–5.3)
POTASSIUM SERPL-SCNC: 4.1 MMOL/L — SIGNIFICANT CHANGE UP (ref 3.5–5.3)
PTH-INTACT SERPL-MCNC: 179.1 PG/ML — HIGH (ref 15–65)
SODIUM SERPL-SCNC: 139 MMOL/L — SIGNIFICANT CHANGE UP (ref 135–145)
TSH SERPL-MCNC: 3.54 UIU/ML — SIGNIFICANT CHANGE UP (ref 0.27–4.2)

## 2018-03-18 PROCEDURE — 99233 SBSQ HOSP IP/OBS HIGH 50: CPT | Mod: GC

## 2018-03-18 RX ORDER — NIFEDIPINE 30 MG
90 TABLET, EXTENDED RELEASE 24 HR ORAL ONCE
Qty: 0 | Refills: 0 | Status: COMPLETED | OUTPATIENT
Start: 2018-03-18 | End: 2018-03-18

## 2018-03-18 RX ORDER — NIFEDIPINE 30 MG
60 TABLET, EXTENDED RELEASE 24 HR ORAL
Qty: 0 | Refills: 0 | Status: DISCONTINUED | OUTPATIENT
Start: 2018-03-18 | End: 2018-03-19

## 2018-03-18 RX ADMIN — Medication 81 MILLIGRAM(S): at 13:24

## 2018-03-18 RX ADMIN — PANTOPRAZOLE SODIUM 40 MILLIGRAM(S): 20 TABLET, DELAYED RELEASE ORAL at 09:16

## 2018-03-18 RX ADMIN — Medication 100 MILLIGRAM(S): at 05:44

## 2018-03-18 RX ADMIN — LOSARTAN POTASSIUM 100 MILLIGRAM(S): 100 TABLET, FILM COATED ORAL at 05:44

## 2018-03-18 RX ADMIN — PREGABALIN 1000 MICROGRAM(S): 225 CAPSULE ORAL at 13:25

## 2018-03-18 RX ADMIN — CLOPIDOGREL BISULFATE 75 MILLIGRAM(S): 75 TABLET, FILM COATED ORAL at 13:24

## 2018-03-18 RX ADMIN — Medication 30 MILLIGRAM(S): at 05:44

## 2018-03-18 RX ADMIN — Medication 667 MILLIGRAM(S): at 09:15

## 2018-03-18 RX ADMIN — Medication 60 MILLIGRAM(S): at 17:50

## 2018-03-18 RX ADMIN — Medication 2: at 18:17

## 2018-03-18 RX ADMIN — Medication 90 MILLIGRAM(S): at 02:05

## 2018-03-18 NOTE — PROGRESS NOTE ADULT - PROBLEM SELECTOR PLAN 9
SCDs given bleeding risk  Ambulate as tolerated      Jade Wolff D.O.  Medicine Intern  pager [phone] SCDs given bleeding risk  Ambulate as tolerated

## 2018-03-18 NOTE — PROGRESS NOTE ADULT - PROBLEM SELECTOR PLAN 6
-last HgbA1c level = 5.7 in 10/2017  -on oral meds, will hold in inpatient setting  -low dose ISS, monitor FS  -f/u HgbA1c level in the AM  -consistent carb diet

## 2018-03-18 NOTE — PROVIDER CONTACT NOTE (OTHER) - RECOMMENDATIONS
No new order.
No new recommendations or orders.
Administer 6am BP meds and continue to monitor.
MD aware, no orders given. Report was given to RN.  Primary RN will verify with MD once pt comes back to the floor.
None at this time, continue to monitor pt

## 2018-03-18 NOTE — PROGRESS NOTE ADULT - SUBJECTIVE AND OBJECTIVE BOX
Medicine Progress Note- PGY2    =========================================  CONTACT INFO  Sherif Gordon M.D., PGY-2  Pager: [phone]  NS: [phone]    Chief Complaint: Patient is a 77y old  Male who presents with a chief complaint of Bleeding from LUE (arm) AV fistula site (17 Mar 2018 05:57)      INTERVAL HPI/OVERNIGHT EVENTS: No events ON. Still hypertensive.    REVIEW OF SYSTEMS:   CONSTITUTIONAL:  Denies f nvd chills  HEENT:  Eyes:  Denies visual loss, blurred vision, double vision or scleral icterus. Ears, Nose, Throat:  Denies hearing loss, sneezing, congestion, runny nose or sore throat.  SKIN:  Denies rash or itching.  CARDIOVASCULAR:  Denies chest pain, DANIELA  RESPIRATORY:  Denies shortness of breath, cough or sputum.  GASTROINTESTINAL:  Denies anorexia, nausea, vomiting or diarrhea. No abdominal pain or blood.  GENITOURINARY:  Denies any hematuria, dysuria  NEUROLOGICAL:  Denies headache, dizziness, syncope, paralysis, ataxia, numbness or tingling in the extremities. No change in bowel or bladder control.  MUSCULOSKELETAL:  Denies muscle, back pain, joint pain or stiffness.  HEMATOLOGIC:  Denies anemia, bleeding or bruising.  LYMPHATICS:  Denies enlarged nodes.   PSYCHIATRIC:  Denies history of depression or anxiety.  ENDOCRINOLOGIC:  Denies reports of sweating, cold or heat intolerance. No polyuria or polydipsia.  ALLERGIES:  Denies history of asthma, hives, eczema or rhinitis.    MEDICATIONS  (STANDING):  aspirin enteric coated 81 milliGRAM(s) Oral daily  clopidogrel Tablet 75 milliGRAM(s) Oral daily  cyanocobalamin Injectable 1000 MICROGram(s) IntraMuscular daily  dextrose 5%. 1000 milliLiter(s) (50 mL/Hr) IV Continuous <Continuous>  dextrose 50% Injectable 12.5 Gram(s) IV Push once  dextrose 50% Injectable 25 Gram(s) IV Push once  dextrose 50% Injectable 25 Gram(s) IV Push once  insulin lispro (HumaLOG) corrective regimen sliding scale   SubCutaneous three times a day before meals  insulin lispro (HumaLOG) corrective regimen sliding scale   SubCutaneous at bedtime  losartan 100 milliGRAM(s) Oral daily  metoprolol succinate  milliGRAM(s) Oral daily  NIFEdipine XL 60 milliGRAM(s) Oral <User Schedule>  pantoprazole    Tablet 40 milliGRAM(s) Oral before breakfast    MEDICATIONS  (PRN):  dextrose Gel 1 Dose(s) Oral once PRN Blood Glucose LESS THAN 70 milliGRAM(s)/deciliter  glucagon  Injectable 1 milliGRAM(s) IntraMuscular once PRN Glucose LESS THAN 70 milligrams/deciliter      Vital Signs Last 24 Hrs  T(C): 37.2 (18 Mar 2018 05:49), Max: 37.2 (17 Mar 2018 22:15)  T(F): 98.9 (18 Mar 2018 05:49), Max: 99 (17 Mar 2018 22:15)  HR: 67 (18 Mar 2018 05:49) (63 - 69)  BP: 185/70 (18 Mar 2018 05:49) (185/70 - 208/79)  BP(mean): --  RR: 18 (18 Mar 2018 05:49) (16 - 18)  SpO2: 99% (18 Mar 2018 05:49) (95% - 100%)  Supplemental O2: [ ] No, on Room Air [ ] Yes,     I&O's Detail    17 Mar 2018 07:01  -  18 Mar 2018 07:00  --------------------------------------------------------  IN:    Other: 600 mL  Total IN: 600 mL    OUT:    Other: 2400 mL  Total OUT: 2400 mL    Total NET: -1800 mL        CAPILLARY BLOOD GLUCOSE      POCT Blood Glucose.: 131 mg/dL (18 Mar 2018 11:54)  POCT Blood Glucose.: 107 mg/dL (18 Mar 2018 08:31)  POCT Blood Glucose.: 135 mg/dL (17 Mar 2018 22:10)  POCT Blood Glucose.: 203 mg/dL (17 Mar 2018 17:45)      PHYSICAL EXAM:  Daily     Daily    GENERAL: NAD,   HEAD:  NC/AT  EYES: EOMI, white sclerae  ENMT: MMM, no oropharyngeal lesions or erythema appreciated, dentition well appearing  Neck: trachea midline, no appreciable masses  Pulm: normal work of breathing, CTA b/l  CV: S1&S2+, rrr, no m/r/g appreciated  ABDOMEN: soft, nt, nd,   : no cva tenderness, no cordoba placed  EXTREMITIES:  no appreciable edema in b/l LE  Neuro: A&Ox3, no focal deficits  SKIN: warm and dry, no visible rash    LABS:                        14.0   5.87  )-----------( 113      ( 16 Mar 2018 22:08 )             42.7     03-18    139  |  97<L>  |  20  ----------------------------<  106<H>  4.1   |  29  |  3.35<H>    Ca    8.2<L>      18 Mar 2018 05:21  Phos  2.2     03-18  Mg     2.0     03-18    TPro  8.0  /  Alb  4.0  /  TBili  0.4  /  DBili  x   /  AST  68<H>  /  ALT  18  /  AlkPhos  53  03-16    LIVER FUNCTIONS - ( 16 Mar 2018 22:08 )  Alb: 4.0 g/dL / Pro: 8.0 g/dL / ALK PHOS: 53 u/L / ALT: 18 u/L / AST: 68 u/L / GGT: x     / T. Bili 0.4 mg/dL / D. Bili x         PT/INR - ( 16 Mar 2018 22:08 )   PT: 10.7 SEC;   INR: 0.96          PTT - ( 16 Mar 2018 22:08 )  PTT:33.0 SEC            Microbiology:    RADIOLOGY & ADDITIONAL TESTS: Medicine Progress Note- PGY2    =========================================  CONTACT INFO  Sherif Gordon M.D., PGY-2  Pager: [phone]  NS: [phone]    Chief Complaint: Patient is a 77y old  Male who presents with a chief complaint of Bleeding from LUE (arm) AV fistula site (17 Mar 2018 05:57)      INTERVAL HPI/OVERNIGHT EVENTS: No events ON. Still hypertensive.    REVIEW OF SYSTEMS:   CONSTITUTIONAL:  Denies f nvd chills  HEENT:  Eyes:  Denies visual loss, blurred vision, double vision or scleral icterus. Ears, Nose, Throat:  Denies hearing loss, sneezing, congestion, runny nose or sore throat.  SKIN:  Denies rash or itching.  CARDIOVASCULAR:  Denies chest pain, DANIELA  RESPIRATORY:  Denies shortness of breath, cough or sputum.  GASTROINTESTINAL:  Denies anorexia, nausea, vomiting or diarrhea. No abdominal pain or blood.  GENITOURINARY:  Denies any hematuria, dysuria  NEUROLOGICAL:  Denies headache, dizziness, syncope, paralysis, ataxia, numbness or tingling in the extremities. No change in bowel or bladder control.  MUSCULOSKELETAL:  Denies muscle, back pain, joint pain or stiffness.  HEMATOLOGIC:  Denies anemia, bleeding or bruising.  LYMPHATICS:  Denies enlarged nodes.   PSYCHIATRIC:  Denies history of depression or anxiety.  ENDOCRINOLOGIC:  Denies reports of sweating, cold or heat intolerance. No polyuria or polydipsia.  ALLERGIES:  Denies history of asthma, hives, eczema or rhinitis.    MEDICATIONS  (STANDING):  aspirin enteric coated 81 milliGRAM(s) Oral daily  clopidogrel Tablet 75 milliGRAM(s) Oral daily  cyanocobalamin Injectable 1000 MICROGram(s) IntraMuscular daily  dextrose 5%. 1000 milliLiter(s) (50 mL/Hr) IV Continuous <Continuous>  dextrose 50% Injectable 12.5 Gram(s) IV Push once  dextrose 50% Injectable 25 Gram(s) IV Push once  dextrose 50% Injectable 25 Gram(s) IV Push once  insulin lispro (HumaLOG) corrective regimen sliding scale   SubCutaneous three times a day before meals  insulin lispro (HumaLOG) corrective regimen sliding scale   SubCutaneous at bedtime  losartan 100 milliGRAM(s) Oral daily  metoprolol succinate  milliGRAM(s) Oral daily  NIFEdipine XL 60 milliGRAM(s) Oral <User Schedule>  pantoprazole    Tablet 40 milliGRAM(s) Oral before breakfast    MEDICATIONS  (PRN):  dextrose Gel 1 Dose(s) Oral once PRN Blood Glucose LESS THAN 70 milliGRAM(s)/deciliter  glucagon  Injectable 1 milliGRAM(s) IntraMuscular once PRN Glucose LESS THAN 70 milligrams/deciliter      Vital Signs Last 24 Hrs  T(C): 37.2 (18 Mar 2018 05:49), Max: 37.2 (17 Mar 2018 22:15)  T(F): 98.9 (18 Mar 2018 05:49), Max: 99 (17 Mar 2018 22:15)  HR: 67 (18 Mar 2018 05:49) (63 - 69)  BP: 185/70 (18 Mar 2018 05:49) (185/70 - 208/79)  BP(mean): --  RR: 18 (18 Mar 2018 05:49) (16 - 18)  SpO2: 99% (18 Mar 2018 05:49) (95% - 100%)  Supplemental O2: [ ] No, on Room Air [ ] Yes,     I&O's Detail    17 Mar 2018 07:01  -  18 Mar 2018 07:00  --------------------------------------------------------  IN:    Other: 600 mL  Total IN: 600 mL    OUT:    Other: 2400 mL  Total OUT: 2400 mL    Total NET: -1800 mL        CAPILLARY BLOOD GLUCOSE      POCT Blood Glucose.: 131 mg/dL (18 Mar 2018 11:54)  POCT Blood Glucose.: 107 mg/dL (18 Mar 2018 08:31)  POCT Blood Glucose.: 135 mg/dL (17 Mar 2018 22:10)  POCT Blood Glucose.: 203 mg/dL (17 Mar 2018 17:45)      PHYSICAL EXAM:  Daily     Daily    GENERAL: NAD,   HEAD:  NC/AT  EYES: EOMI, white sclerae  ENMT: MMM, no oropharyngeal lesions or erythema appreciated, dentition well appearing  Neck: trachea midline, no appreciable masses  Pulm: normal work of breathing, CTA b/l  CV: S1&S2+, rrr, no m/r/g appreciated  ABDOMEN: soft, nt, nd,   : no cva tenderness, no cordoba placed  EXTREMITIES:  no appreciable edema in b/l LE  Neuro: A&Ox2, no focal deficits  SKIN: warm and dry, no visible rash    LABS:                        14.0   5.87  )-----------( 113      ( 16 Mar 2018 22:08 )             42.7     03-18    139  |  97<L>  |  20  ----------------------------<  106<H>  4.1   |  29  |  3.35<H>    Ca    8.2<L>      18 Mar 2018 05:21  Phos  2.2     03-18  Mg     2.0     03-18    TPro  8.0  /  Alb  4.0  /  TBili  0.4  /  DBili  x   /  AST  68<H>  /  ALT  18  /  AlkPhos  53  03-16    LIVER FUNCTIONS - ( 16 Mar 2018 22:08 )  Alb: 4.0 g/dL / Pro: 8.0 g/dL / ALK PHOS: 53 u/L / ALT: 18 u/L / AST: 68 u/L / GGT: x     / T. Bili 0.4 mg/dL / D. Bili x         PT/INR - ( 16 Mar 2018 22:08 )   PT: 10.7 SEC;   INR: 0.96          PTT - ( 16 Mar 2018 22:08 )  PTT:33.0 SEC            Microbiology:    RADIOLOGY & ADDITIONAL TESTS:

## 2018-03-18 NOTE — PROVIDER CONTACT NOTE (OTHER) - ASSESSMENT
vs 171/56, HR 61, RR 17, o2sat 97% RA, no sob or respiratory distress noted.
Provider notified of SBP above parameter. No complaint of headache.
/79 HR 63 pt asymptomatic
Pt is alert and oriented w/ no s/s of acute distress, elevated BP
Pt is alert and oriented. No s/s of distress. Pt is asymptomatic.

## 2018-03-18 NOTE — PROGRESS NOTE ADULT - PROBLEM SELECTOR PLAN 3
Elevated BP. Recc to change Nifedipine to 60mg PO bid. Monitor BP.   Please do not hold anti-hypertensive medications prior to HD.

## 2018-03-18 NOTE — PROGRESS NOTE ADULT - SUBJECTIVE AND OBJECTIVE BOX
[facility], [place] NEPHROLOGY- PROGRESS NOTE    Patient is a 78yo Male with ESRD on HD presents to the ED with bleeding from fistula site. Renal consulted for ESRD status.     Hospital Medications: Medications reviewed.  REVIEW OF SYSTEMS:  CONSTITUTIONAL: No fevers or chills +fatigue  RESPIRATORY: No shortness of breath  CARDIOVASCULAR: No chest pain.  GASTROINTESTINAL: No nausea, vomiting, diarrhea or abdominal pain.   VASCULAR: No bilateral lower extremity edema.     VITALS:  T(F): 98.9 (03-18-18 @ 05:49), Max: 99 (03-17-18 @ 22:15)  HR: 67 (03-18-18 @ 05:49)  BP: 185/70 (03-18-18 @ 05:49)  RR: 18 (03-18-18 @ 05:49)  SpO2: 99% (03-18-18 @ 05:49)  Wt(kg): --  Height (cm): 157.48 (03-17 @ 04:35)  Weight (kg): 52.1 (03-17 @ 04:35)  BMI (kg/m2): 21 (03-17 @ 04:35)  BSA (m2): 1.51 (03-17 @ 04:35)    03-17 @ 07:01  -  03-18 @ 07:00  --------------------------------------------------------  IN: 600 mL / OUT: 2400 mL / NET: -1800 mL      PHYSICAL EXAM:  Gen: NAD, sleepy  Cards: RRR, +S1/S2, no M/G/R  Resp: CTA ant.   GI: soft, NT/ND, NABS  Extremities: no LE edema B/L  Access: Left AVF +thrill +bruit (no active bleeding)      LABS:  03-18    139  |  97<L>  |  20  ----------------------------<  106<H>  4.1   |  29  |  3.35<H>    Ca    8.2<L>      18 Mar 2018 05:21  Phos  2.2     03-18  Mg     2.0     03-18    TPro  8.0  /  Alb  4.0  /  TBili  0.4  /  DBili      /  AST  68<H>  /  ALT  18  /  AlkPhos  53  03-16    Creatinine Trend: 3.35 <--, 5.30 <--, 5.05 <--                        14.0   5.87  )-----------( 113      ( 16 Mar 2018 22:08 )             42.7     Urine Studies:      RADIOLOGY & ADDITIONAL STUDIES:

## 2018-03-18 NOTE — PROGRESS NOTE ADULT - PROBLEM SELECTOR PLAN 5
a/w AVF bleeding. Pt seen by Vascular, no surgical interventions needed. Compression bandage placed and no further bleeding. Monitor CBC.

## 2018-03-19 ENCOUNTER — TRANSCRIPTION ENCOUNTER (OUTPATIENT)
Age: 78
End: 2018-03-19

## 2018-03-19 VITALS
RESPIRATION RATE: 18 BRPM | OXYGEN SATURATION: 100 % | TEMPERATURE: 98 F | SYSTOLIC BLOOD PRESSURE: 137 MMHG | HEART RATE: 57 BPM | DIASTOLIC BLOOD PRESSURE: 80 MMHG

## 2018-03-19 LAB
BUN SERPL-MCNC: 33 MG/DL — HIGH (ref 7–23)
CALCIUM SERPL-MCNC: 7.9 MG/DL — LOW (ref 8.4–10.5)
CHLORIDE SERPL-SCNC: 95 MMOL/L — LOW (ref 98–107)
CO2 SERPL-SCNC: 27 MMOL/L — SIGNIFICANT CHANGE UP (ref 22–31)
CREAT SERPL-MCNC: 5.36 MG/DL — HIGH (ref 0.5–1.3)
GLUCOSE SERPL-MCNC: 109 MG/DL — HIGH (ref 70–99)
HCT VFR BLD CALC: 33.1 % — LOW (ref 39–50)
HGB BLD-MCNC: 10.9 G/DL — LOW (ref 13–17)
MCHC RBC-ENTMCNC: 29.5 PG — SIGNIFICANT CHANGE UP (ref 27–34)
MCHC RBC-ENTMCNC: 32.9 % — SIGNIFICANT CHANGE UP (ref 32–36)
MCV RBC AUTO: 89.7 FL — SIGNIFICANT CHANGE UP (ref 80–100)
NRBC # FLD: 0 — SIGNIFICANT CHANGE UP
PLATELET # BLD AUTO: 158 K/UL — SIGNIFICANT CHANGE UP (ref 150–400)
PMV BLD: 10.4 FL — SIGNIFICANT CHANGE UP (ref 7–13)
POTASSIUM SERPL-MCNC: 4.9 MMOL/L — SIGNIFICANT CHANGE UP (ref 3.5–5.3)
POTASSIUM SERPL-SCNC: 4.9 MMOL/L — SIGNIFICANT CHANGE UP (ref 3.5–5.3)
RBC # BLD: 3.69 M/UL — LOW (ref 4.2–5.8)
RBC # FLD: 14.6 % — HIGH (ref 10.3–14.5)
SODIUM SERPL-SCNC: 136 MMOL/L — SIGNIFICANT CHANGE UP (ref 135–145)
WBC # BLD: 8.15 K/UL — SIGNIFICANT CHANGE UP (ref 3.8–10.5)
WBC # FLD AUTO: 8.15 K/UL — SIGNIFICANT CHANGE UP (ref 3.8–10.5)

## 2018-03-19 PROCEDURE — 99239 HOSP IP/OBS DSCHRG MGMT >30: CPT

## 2018-03-19 RX ORDER — NIFEDIPINE 30 MG
1 TABLET, EXTENDED RELEASE 24 HR ORAL
Qty: 0 | Refills: 0 | COMMUNITY

## 2018-03-19 RX ORDER — ATORVASTATIN CALCIUM 80 MG/1
1 TABLET, FILM COATED ORAL
Qty: 30 | Refills: 0 | OUTPATIENT
Start: 2018-03-19 | End: 2018-04-17

## 2018-03-19 RX ORDER — PREGABALIN 225 MG/1
1 CAPSULE ORAL
Qty: 30 | Refills: 0 | OUTPATIENT
Start: 2018-03-19 | End: 2018-04-17

## 2018-03-19 RX ORDER — ATORVASTATIN CALCIUM 80 MG/1
40 TABLET, FILM COATED ORAL AT BEDTIME
Qty: 0 | Refills: 0 | Status: DISCONTINUED | OUTPATIENT
Start: 2018-03-19 | End: 2018-03-19

## 2018-03-19 RX ORDER — NIFEDIPINE 30 MG
1 TABLET, EXTENDED RELEASE 24 HR ORAL
Qty: 30 | Refills: 0 | OUTPATIENT
Start: 2018-03-19 | End: 2018-04-17

## 2018-03-19 RX ADMIN — PANTOPRAZOLE SODIUM 40 MILLIGRAM(S): 20 TABLET, DELAYED RELEASE ORAL at 08:33

## 2018-03-19 RX ADMIN — Medication 81 MILLIGRAM(S): at 12:06

## 2018-03-19 RX ADMIN — ATORVASTATIN CALCIUM 40 MILLIGRAM(S): 80 TABLET, FILM COATED ORAL at 14:30

## 2018-03-19 RX ADMIN — Medication 1: at 12:55

## 2018-03-19 RX ADMIN — Medication 100 MILLIGRAM(S): at 08:33

## 2018-03-19 RX ADMIN — LOSARTAN POTASSIUM 100 MILLIGRAM(S): 100 TABLET, FILM COATED ORAL at 08:32

## 2018-03-19 RX ADMIN — Medication 60 MILLIGRAM(S): at 08:33

## 2018-03-19 RX ADMIN — CLOPIDOGREL BISULFATE 75 MILLIGRAM(S): 75 TABLET, FILM COATED ORAL at 12:06

## 2018-03-19 RX ADMIN — PREGABALIN 1000 MICROGRAM(S): 225 CAPSULE ORAL at 12:07

## 2018-03-19 NOTE — DISCHARGE NOTE ADULT - SECONDARY DIAGNOSIS.
End-stage renal disease (ESRD) CAD (coronary artery disease) DM (diabetes mellitus) HTN (hypertension) HLD (hyperlipidemia) Vitamin B12 deficiency

## 2018-03-19 NOTE — PROGRESS NOTE ADULT - PROBLEM SELECTOR PLAN 5
-Patient should be on atorvastatin given recent stenting  -obtain collateral if any reason why not on same. If no contraindication, restart statin - Patient should be on atorvastatin given recent stenting  - obtain collateral if any reason why not on same. If no contraindication, restart statin

## 2018-03-19 NOTE — PROGRESS NOTE ADULT - PROBLEM SELECTOR PROBLEM 2
ESRD (end stage renal disease)
Anemia of renal disease
Anemia of renal disease
ESRD (end stage renal disease)
ESRD (end stage renal disease)

## 2018-03-19 NOTE — PROGRESS NOTE ADULT - PROBLEM SELECTOR PLAN 4
-with stent put in 5 months ago. Continue with dual antiplatelet therapy. C/w metoprolol, losartan. Unclear why pt off statin, would obtain collateral information. Stent put in 5 months ago. Continue with dual antiplatelet therapy. C/w metoprolol, losartan. Unclear why pt off statin, will start high intensity statin

## 2018-03-19 NOTE — PROVIDER CONTACT NOTE (OTHER) - ACTION/TREATMENT ORDERED:
No new order.
ok to give BP meds
Will continue to monitor BP.
MD notified, as per md, continue to monitor, no new orders at this time
No orders given.
Per MD, administer standing meds and continue to monitor. Pt is scheduled for dialysis later today so labs can be drawn in HD.

## 2018-03-19 NOTE — DISCHARGE NOTE ADULT - HOSPITAL COURSE
77M hx ESRD on HD (Tu/Th/Sat), CAD s/p PCI (2017), T2DM, HTN, HLD, admitted on 3/17 for spontaneous bleeding from his AV fistula site.  Vascular surgery was consulted and the bleeding resolved with a pressure dressing.  He also received a dose of DDVAP.  Admission Hgb was 14 which was likely hemoconcentrated as his prior Hgb were in the 9-10 range.  His repeat Hgb was 10.9, which was in line with his baseline.  Other events during his hospital course included HTN urgency with SBP peaking in the 200-210s.  His home BP regimen of metoprolol, losartan and nifedipine was resumed with improvement.  He was continued on ASA and plavix for his CAD.  It was noted that he had not been a statin for his CAD, so a high potency statin was added prior to discharge. 77M hx ESRD on HD (Tu/Th/Sat), CAD s/p PCI (2017), T2DM, HTN, HLD, admitted on 3/17 for spontaneous bleeding from his AV fistula site.  Vascular surgery was consulted and the bleeding resolved with a pressure dressing.  He also received a dose of DDVAP.  Admission Hgb was 14 which was likely hemoconcentrated as his prior Hgb were in the 9-10 range.  His repeat Hgb was 10.9, which was in line with his baseline.  Other events during his hospital course included HTN urgency with SBP peaking in the 200-210s.  His home BP regimen of metoprolol, losartan and nifedipine was resumed with improvement.  He was continued on ASA and plavix for his CAD.  It was noted that he had not been a statin for his CAD, so a high potency statin was added prior to discharge. He was also started on vitamin B12 for low levels (123) noted on 10/2017 labs. 77M hx ESRD on HD (Tu/Th/Sat), CAD s/p PCI (2017), T2DM, HTN, HLD, admitted on 3/17 for spontaneous bleeding from his AV fistula site.  Vascular surgery was consulted and the bleeding resolved with a pressure dressing.  He also received a dose of DDVAP for potential component of uremic bleeding though BUN was only 43.  Admission Hgb was 14 which was likely hemoconcentrated as his prior Hgb were in the 9-10 range.  His repeat Hgb was 10.9, which was in line with his baseline.  Other events during his hospital course included HTN urgency with SBP peaking in the 200-210s.  His home BP regimen of metoprolol, losartan and nifedipine was resumed with improvement.  He was continued on ASA and plavix for his CAD.  It was noted that he had not been a statin for his CAD, so a high potency statin was added prior to discharge. He was also started on vitamin B12 for low levels (123) noted on 10/2017 labs. 77M hx ESRD on HD (Tu/Th/Sat), CAD s/p PCI (2017), T2DM, HTN, HLD, admitted on 3/17 for spontaneous bleeding from his AV fistula site.  Vascular surgery was consulted and the bleeding resolved with a pressure dressing.  He also received a dose of DDVAP for potential component of uremic bleeding though BUN was only 43.  Admission Hgb was 14 which was likely hemoconcentrated as his prior Hgb were in the 9-10 range.  His repeat Hgb was 10.9, which was in line with his baseline.  Other events during his hospital course included HTN urgency with SBP peaking in the 200-210s.   Per renal, the dosing of his nifedipine was changed from 30mg in AM and 90mg at bedtime to 60mg BID for better BP control.  His other BP meds (metoprolol and losartan) were resumed at his home doses. He was continued on ASA and plavix for his CAD.  It was noted that he had not been a statin for his CAD, so a high potency statin was added prior to discharge. He was also started on vitamin B12 for low levels (123) noted on 10/2017 labs.

## 2018-03-19 NOTE — PROGRESS NOTE ADULT - PROBLEM SELECTOR PLAN 6
-last HgbA1c level = 5.7 in 10/2017  -on oral meds, will hold in inpatient setting  -low dose ISS, monitor FS  - f/u HgbA1c level

## 2018-03-19 NOTE — PROGRESS NOTE ADULT - PROBLEM SELECTOR PROBLEM 4
CAD (coronary artery disease)
CAD (coronary artery disease)
Hyperphosphatemia
Hyperphosphatemia
CAD (coronary artery disease)

## 2018-03-19 NOTE — PROGRESS NOTE ADULT - PROBLEM SELECTOR PLAN 2
-on a Tu, Thu, Sat schedule.  -[facility] aware and will plan for HD.  -Monitor after HD and overnight for any return of bleeding.   -electrolytes stable  -avoid nephrotoxins
-on a Tu, Thu, Sat schedule.  -Due for HD 3/20  -[facility] aware and will plan for HD.  -Monitor after HD and overnight for any return of bleeding.   -electrolytes stable  -avoid nephrotoxins
Elevated hgb. No need for LEXIE. Monitor hgb.
Hb acceptable. Monitor off LEXIE.
-on a Tu, Thu, Sat schedule.  -Due for HD Today.   -[facility] aware and will plan for HD.  -Monitor after HD and overnight for any return of bleeding.   -electrolytes stable  -avoid nephrotoxins

## 2018-03-19 NOTE — PROGRESS NOTE ADULT - PROBLEM SELECTOR PROBLEM 3
Hypocalcemia
Hypertensive kidney disease with stage 5 chronic kidney disease
Hypertensive kidney disease with stage 5 chronic kidney disease
Hypocalcemia
Hypocalcemia

## 2018-03-19 NOTE — PROVIDER CONTACT NOTE (OTHER) - BACKGROUND
Pt with DX of left av fistula bleeding, hx of ESRD on hemodialysis.
Hx HTN
Pt admitted on 3/17 for bleeding LUE AVF, PMH T2DM, HTN, CAD, ESRD HD T/TH/Sat
Pt admitted on 3/17/18 for acquired AVF. PMH of dementia, ESRD, diabetes, HTN, thrombocytopenia.
admitting diagnosis: acquired AVF PMH CAD, PVD, ESRD, DM
pt admitted with bleeding LUE AVF hx ESRD

## 2018-03-19 NOTE — PROGRESS NOTE ADULT - PROBLEM SELECTOR PLAN 3
-Ca of 7.1, (albumin = 4) - likely 2/2 to CKD.   - f/u Will check ionized ca, vit d level  -start on calcium acetate  -continue to monitor 2/2 to CKD improving with repletion.  - c/w calcium acetate

## 2018-03-19 NOTE — PROGRESS NOTE ADULT - SUBJECTIVE AND OBJECTIVE BOX
Vascular Surgery Progress Note  [facility] C team [id number]      Subjective/interval events:  -no complaints  -reports no further bleeding      Objective:  Vital Signs Last 24 Hrs  T(C): 36.8 (19 Mar 2018 06:06), Max: 37 (18 Mar 2018 21:24)  T(F): 98.2 (19 Mar 2018 06:06), Max: 98.6 (18 Mar 2018 21:24)  HR: 60 (19 Mar 2018 07:42) (58 - 61)  BP: 149/48 (19 Mar 2018 07:42) (141/50 - 171/56)  BP(mean): --  RR: 18 (19 Mar 2018 06:06) (16 - 18)  SpO2: 98% (19 Mar 2018 06:06) (98% - 100%)      I&O's Summary: none      PE:  Gen: NAD, laying in bed  Pulm: breathing comfortably on RA  Ext: LUE AVF, dressing removed, no evidence of bleeding, dressing dry.       MEDICATIONS  (STANDING):  aspirin enteric coated 81 milliGRAM(s) Oral daily  clopidogrel Tablet 75 milliGRAM(s) Oral daily  cyanocobalamin Injectable 1000 MICROGram(s) IntraMuscular daily  dextrose 5%. 1000 milliLiter(s) (50 mL/Hr) IV Continuous <Continuous>  dextrose 50% Injectable 12.5 Gram(s) IV Push once  dextrose 50% Injectable 25 Gram(s) IV Push once  dextrose 50% Injectable 25 Gram(s) IV Push once  insulin lispro (HumaLOG) corrective regimen sliding scale   SubCutaneous three times a day before meals  insulin lispro (HumaLOG) corrective regimen sliding scale   SubCutaneous at bedtime  losartan 100 milliGRAM(s) Oral daily  metoprolol succinate  milliGRAM(s) Oral daily  NIFEdipine XL 60 milliGRAM(s) Oral <User Schedule>  pantoprazole    Tablet 40 milliGRAM(s) Oral before breakfast    MEDICATIONS  (PRN):  dextrose Gel 1 Dose(s) Oral once PRN Blood Glucose LESS THAN 70 milliGRAM(s)/deciliter  glucagon  Injectable 1 milliGRAM(s) IntraMuscular once PRN Glucose LESS THAN 70 milligrams/deciliter      Labs:                        10.9   8.15  )-----------( 158      ( 19 Mar 2018 06:15 )             33.1     03-19    136  |  95<L>  |  33<H>  ----------------------------<  109<H>  4.9   |  27  |  5.36<H>    Ca    7.9<L>      19 Mar 2018 06:15  Phos  2.2     03-18  Mg     2.0     03-18        Imaging:  None

## 2018-03-19 NOTE — PROGRESS NOTE ADULT - ASSESSMENT
77M pmh T2DM, HTN, HLD, CAD s/p cardiac stents ~5 month on Plavix, ESRD on HD T/Th/Sa via LUE AVF, dementia presents to the ED with bleeding from fistula site likely 2/2 to uremia and dual antiplatelet therapy
77M pmh T2DM, HTN, HLD, CAD s/p cardiac stents ~5 month on Plavix, ESRD on HD T/Th/Sa via LUE AVF, dementia presents to the ED with bleeding from fistula site likely 2/2 to uremia and dual antiplatelet therapy
77M with ESRD on HD via LUE AVF with bleeding from needle/access site for HD s/p DDAVP.     -no acute Vascular Surgery intervention indicated at this time  -no further bleeding  -consider restarting chemical DVTppx    -patient can follow up with Dr. Ibrahim as needed (patient does not know who his vascular surgeon is)    -please contact if any change in clinical status.       INDIANA Rodrigues MD (PGY2)    (Please page C team [phone] for questions/concerns.)
Patient is a 76yo Male with ESRD on HD presents to the ED with bleeding from fistula site. Renal consulted for ESRD status.
Patient is a 78yo Male with ESRD on HD presents to the ED with bleeding from fistula site. Renal consulted for ESRD status.
77M pmh T2DM, HTN, HLD, CAD s/p cardiac stents ~5 month on Plavix, ESRD on HD T/Th/Sa via LUE AVF, dementia presents to the ED with bleeding from fistula site likely 2/2 to uremia and dual antiplatelet therapy

## 2018-03-19 NOTE — PROVIDER CONTACT NOTE (OTHER) - SITUATION
Patient BP above parameter during Dialysis treatment. 181.70
/86
Pt's post BP very high 208/79 HR 63, pt asymptomatic. pt awake, pt's wife at bedside
elevated systolic 
pt with bp 148/48
Patient c/o SOB.

## 2018-03-19 NOTE — PROGRESS NOTE ADULT - PROBLEM SELECTOR PROBLEM 5
Medication management
Medication management
Arteriovenous fistula
Arteriovenous fistula
Medication management

## 2018-03-19 NOTE — CHART NOTE - NSCHARTNOTEFT_GEN_A_CORE
Called around 6:25 AM about diastolic pressure of 50; night nurse did not feel comfortable giving morning meds and would like further clarification and diastolic hold parameters from the day team; since the day team will be here shortly, told night nurse to hold meds until 7 AM and then page them.

## 2018-03-19 NOTE — DISCHARGE NOTE ADULT - PLAN OF CARE
Resolution of bleeding. You were admitted for bleeding from your AV fistula site which resolved with a pressure dressing.  Your blood counts remained stable throughout your hospitalization.  Return to the ER or call 911 if you notice significant bleeding from your AV site or if you develop recurrent bleeding associated with dizziness. Resume hemodialysis scheduling. Resume your dialysis schedule as directed.  Follow up with your outpatient nephrologist. Continued medical management.  Outpatient follow up with cardiologist. Continue to take aspirin and plavix.  During your hospitalization, it was noted that you were not on a statin medication so this was added. Continued medical management. Outpatient follow up with primary care doctor. Continue to take glimepiride as directed. Continue to take metoprolol, losartan and nifedipine as directed. Continued medical management. You were started on vitamin B12 for deficiency seen on prior labs. You were started on a vitamin B12 table for low levels seen on prior labs. The dose of your nifedipine was changed to 60mg twice daily for better blood pressure control.  Please  the new prescriptions from your pharmacy. Continue to take metoprolol and losartan as directed, You were started on a vitamin B12 tablet for low levels seen on prior labs.

## 2018-03-19 NOTE — PROGRESS NOTE ADULT - PROBLEM SELECTOR PLAN 7
-history of resistant hypertension; BP to 214/86  -hypertensive without signs of end organ damage  -restart metoprolol, losartan and nifedipine  -avoid dropping blood pressure too quickly  -modify therapy, as needed
- history of resistant hypertension  - hypertensive without signs of end organ damage  - c/w metoprolol, losartan and nifedipine  - If no improvement, start hydralazine.
-history of resistant hypertension; SBP in the 200s.  -hypertensive without signs of end organ damage  -restart metoprolol, losartan and nifedipine  -avoid dropping blood pressure too quickly  -modify therapy, as needed. If no improvement will start hydralazine.

## 2018-03-19 NOTE — PROGRESS NOTE ADULT - SUBJECTIVE AND OBJECTIVE BOX
Dean Gaston MD, Internal Medicine, PGY1  Pager - [facility]: [phone] ; [facility]: [phone]    Patient is a 77y old  Male who presents with a chief complaint of Bleeding from LUE (arm) AV fistula site (17 Mar 2018 05:57)        SUBJECTIVE / OVERNIGHT EVENTS: NAEON. No further episodes of bleeding. Denies HA, dizziness, lightheadedness, CP, SOB, abd pain, N/V/D.      MEDICATIONS  (STANDING):  aspirin enteric coated 81 milliGRAM(s) Oral daily  clopidogrel Tablet 75 milliGRAM(s) Oral daily  cyanocobalamin Injectable 1000 MICROGram(s) IntraMuscular daily  dextrose 5%. 1000 milliLiter(s) (50 mL/Hr) IV Continuous <Continuous>  dextrose 50% Injectable 12.5 Gram(s) IV Push once  dextrose 50% Injectable 25 Gram(s) IV Push once  dextrose 50% Injectable 25 Gram(s) IV Push once  insulin lispro (HumaLOG) corrective regimen sliding scale   SubCutaneous three times a day before meals  insulin lispro (HumaLOG) corrective regimen sliding scale   SubCutaneous at bedtime  losartan 100 milliGRAM(s) Oral daily  metoprolol succinate  milliGRAM(s) Oral daily  NIFEdipine XL 60 milliGRAM(s) Oral <User Schedule>  pantoprazole    Tablet 40 milliGRAM(s) Oral before breakfast    MEDICATIONS  (PRN):  dextrose Gel 1 Dose(s) Oral once PRN Blood Glucose LESS THAN 70 milliGRAM(s)/deciliter  glucagon  Injectable 1 milliGRAM(s) IntraMuscular once PRN Glucose LESS THAN 70 milligrams/deciliter      T(C): 36.8 (03-19-18 @ 06:06), Max: 37 (03-18-18 @ 21:24)  T(F): 98.2 (03-19-18 @ 06:06), Max: 98.6 (03-18-18 @ 21:24)  HR: 60 (03-19-18 @ 07:42) (58 - 61)  BP: 149/48 (03-19-18 @ 07:42) (141/50 - 171/56)  ABP: --  ABP(mean): --  RR: 18 (03-19-18 @ 06:06) (16 - 18)  SpO2: 98% (03-19-18 @ 06:06) (98% - 100%)    CAPILLARY BLOOD GLUCOSE      POCT Blood Glucose.: 105 mg/dL (19 Mar 2018 08:34)  POCT Blood Glucose.: 123 mg/dL (18 Mar 2018 22:29)  POCT Blood Glucose.: 205 mg/dL (18 Mar 2018 17:58)  POCT Blood Glucose.: 131 mg/dL (18 Mar 2018 11:54)    I&O's Summary      PHYSICAL EXAM  GENERAL: NAD, well-developed  HEAD:  Atraumatic, Normocephalic  EYES: EOMI, PERRLA, conjunctiva and sclera clear  NECK: Supple, No JVD  CHEST/LUNG: Clear to auscultation bilaterally; No wheeze  HEART: Regular rate and rhythm; No murmurs, rubs, or gallops  ABDOMEN: Soft, Nontender, Nondistended; Bowel sounds present  EXTREMITIES:  2+ Peripheral Pulses, No clubbing, cyanosis, or edema  NEURO:  AOx3, No focal deficits, CN II-XII intact  PSYCH: Appropriate mood and affect  SKIN: No rashes or lesions    LABS:                        10.9   8.15  )-----------( 158      ( 19 Mar 2018 06:15 )             33.1     03-19    136  |  95<L>  |  33<H>  ----------------------------<  109<H>  4.9   |  27  |  5.36<H>    Ca    7.9<L>      19 Mar 2018 06:15  Phos  2.2     03-18  Mg     2.0     03-18                RADIOLOGY & ADDITIONAL TESTS:    Imaging Personally Reviewed:  Consultant(s) Notes Reviewed:    Care Discussed with Consultants/Other Providers:

## 2018-03-19 NOTE — DISCHARGE NOTE ADULT - CARE PLAN
Principal Discharge DX:	Bleeding  Goal:	Resolution of bleeding.  Assessment and plan of treatment:	You were admitted for bleeding from your AV fistula site which resolved with a pressure dressing.  Your blood counts remained stable throughout your hospitalization.  Return to the ER or call 911 if you notice significant bleeding from your AV site or if you develop recurrent bleeding associated with dizziness.  Secondary Diagnosis:	End-stage renal disease (ESRD)  Goal:	Resume hemodialysis scheduling.  Assessment and plan of treatment:	Resume your dialysis schedule as directed.  Follow up with your outpatient nephrologist.  Secondary Diagnosis:	CAD (coronary artery disease)  Goal:	Continued medical management.  Outpatient follow up with cardiologist.  Assessment and plan of treatment:	Continue to take aspirin and plavix.  During your hospitalization, it was noted that you were not on a statin medication so this was added.  Secondary Diagnosis:	DM (diabetes mellitus)  Goal:	Continued medical management. Outpatient follow up with primary care doctor.  Assessment and plan of treatment:	Continue to take glimepiride as directed.  Secondary Diagnosis:	HTN (hypertension)  Goal:	Continued medical management. Outpatient follow up with primary care doctor.  Assessment and plan of treatment:	Continue to take metoprolol, losartan and nifedipine as directed.  Secondary Diagnosis:	HLD (hyperlipidemia) Principal Discharge DX:	Bleeding  Goal:	Resolution of bleeding.  Assessment and plan of treatment:	You were admitted for bleeding from your AV fistula site which resolved with a pressure dressing.  Your blood counts remained stable throughout your hospitalization.  Return to the ER or call 911 if you notice significant bleeding from your AV site or if you develop recurrent bleeding associated with dizziness.  Secondary Diagnosis:	End-stage renal disease (ESRD)  Goal:	Resume hemodialysis scheduling.  Assessment and plan of treatment:	Resume your dialysis schedule as directed.  Follow up with your outpatient nephrologist.  Secondary Diagnosis:	CAD (coronary artery disease)  Goal:	Continued medical management.  Outpatient follow up with cardiologist.  Assessment and plan of treatment:	Continue to take aspirin and plavix.  During your hospitalization, it was noted that you were not on a statin medication so this was added.  Secondary Diagnosis:	DM (diabetes mellitus)  Goal:	Continued medical management. Outpatient follow up with primary care doctor.  Assessment and plan of treatment:	Continue to take glimepiride as directed.  Secondary Diagnosis:	HTN (hypertension)  Goal:	Continued medical management. Outpatient follow up with primary care doctor.  Assessment and plan of treatment:	Continue to take metoprolol, losartan and nifedipine as directed.  Secondary Diagnosis:	Vitamin B12 deficiency  Goal:	Continued medical management.  Assessment and plan of treatment:	You were started on vitamin B12 for deficiency seen on prior labs. Principal Discharge DX:	Bleeding  Goal:	Resolution of bleeding.  Assessment and plan of treatment:	You were admitted for bleeding from your AV fistula site which resolved with a pressure dressing.  Your blood counts remained stable throughout your hospitalization.  Return to the ER or call 911 if you notice significant bleeding from your AV site or if you develop recurrent bleeding associated with dizziness.  Secondary Diagnosis:	End-stage renal disease (ESRD)  Goal:	Resume hemodialysis scheduling.  Assessment and plan of treatment:	Resume your dialysis schedule as directed.  Follow up with your outpatient nephrologist.  Secondary Diagnosis:	CAD (coronary artery disease)  Goal:	Continued medical management.  Outpatient follow up with cardiologist.  Assessment and plan of treatment:	Continue to take aspirin and plavix.  During your hospitalization, it was noted that you were not on a statin medication so this was added.  Secondary Diagnosis:	DM (diabetes mellitus)  Goal:	Continued medical management. Outpatient follow up with primary care doctor.  Assessment and plan of treatment:	Continue to take glimepiride as directed.  Secondary Diagnosis:	HTN (hypertension)  Goal:	Continued medical management. Outpatient follow up with primary care doctor.  Assessment and plan of treatment:	Continue to take metoprolol, losartan and nifedipine as directed.  Secondary Diagnosis:	Vitamin B12 deficiency  Goal:	Continued medical management.  Assessment and plan of treatment:	You were started on a vitamin B12 table for low levels seen on prior labs. Principal Discharge DX:	Bleeding  Goal:	Resolution of bleeding.  Assessment and plan of treatment:	You were admitted for bleeding from your AV fistula site which resolved with a pressure dressing.  Your blood counts remained stable throughout your hospitalization.  Return to the ER or call 911 if you notice significant bleeding from your AV site or if you develop recurrent bleeding associated with dizziness.  Secondary Diagnosis:	End-stage renal disease (ESRD)  Goal:	Resume hemodialysis scheduling.  Assessment and plan of treatment:	Resume your dialysis schedule as directed.  Follow up with your outpatient nephrologist.  Secondary Diagnosis:	CAD (coronary artery disease)  Goal:	Continued medical management.  Outpatient follow up with cardiologist.  Assessment and plan of treatment:	Continue to take aspirin and plavix.  During your hospitalization, it was noted that you were not on a statin medication so this was added.  Secondary Diagnosis:	DM (diabetes mellitus)  Goal:	Continued medical management. Outpatient follow up with primary care doctor.  Assessment and plan of treatment:	Continue to take glimepiride as directed.  Secondary Diagnosis:	HTN (hypertension)  Goal:	Continued medical management. Outpatient follow up with primary care doctor.  Assessment and plan of treatment:	The dose of your nifedipine was changed to 60mg twice daily for better blood pressure control.  Please  the new prescriptions from your pharmacy. Continue to take metoprolol and losartan as directed,  Secondary Diagnosis:	Vitamin B12 deficiency  Goal:	Continued medical management.  Assessment and plan of treatment:	You were started on a vitamin B12 tablet for low levels seen on prior labs.

## 2018-03-19 NOTE — DISCHARGE NOTE ADULT - MEDICATION SUMMARY - MEDICATIONS TO CHANGE
I will SWITCH the dose or number of times a day I take the medications listed below when I get home from the hospital:  None I will SWITCH the dose or number of times a day I take the medications listed below when I get home from the hospital:    NIFEdipine 30 mg oral tablet, extended release  -- 1 tab(s) by mouth once a day (in the morning)    NIFEdipine 90 mg oral tablet, extended release  -- 1 tab(s) by mouth once (at bedtime)

## 2018-03-19 NOTE — PROGRESS NOTE ADULT - ATTENDING COMMENTS
[facility] NEPHROLOGY  Ihsan Saba M.D.  Eduardo Romano D.O.  Loni Ribeiro M.D.  Maris Ramirez, MSN, ANP-C  [phone]    [address] [place] [address]
[facility] NEPHROLOGY  Ihsan Saba M.D.  Eduardo Romano D.O.  Loni Ribeiro M.D.  Maris Ramirez, MSN, ANP-C  [phone]    [address] [place] [address]
Pt was seen and examed at bedside with resident.  78 yo M pmh T2DM, HTN, HLD, CAD s/p cardiac stents ESRD on HD T/Th/Sa via LUE AVF, dementia was admitted for bleeding from fistula site. vascular was consulted in ED, Pt  on pressure dressing, bleeding stropped. H/h Stable. Nephrology consulted for continue HD.  - HTN urgency, restart metoprolol, losartan and nifedipine, close monitor BP.  - CAD s/p stents, continue ASA and plavix.   Wife was explained above findings and plan of care
Pt was seen and examed at bedside with resident.  76 yo M pmh T2DM, HTN, HLD, CAD s/p cardiac stents ESRD on HD T/Th/Sa via LUE AVF, dementia was admitted for bleeding from fistula site. vascular was consulted in ED, Pt  on pressure dressing, bleeding stropped. H/h Stable. Nephrology consulted for continue HD.  - HTN urgency, restart metoprolol, losartan and nifedipine, close monitor BP.   - CAD s/p stents, continue ASA and plavix.   Wife was explained above findings and plan of care . D/c planning in AM if BP improved.
Patient seen and examined. Agree with above note by resident.    AV fistula bleeding resolved. BP improved. HD re-instated for tomorrow. PT evaluation offered however family declined. DC home today. Time spent>30 mins.

## 2018-03-19 NOTE — PROGRESS NOTE ADULT - PROBLEM SELECTOR PLAN 8
-Vitamin B12 = 123 in 10/2017  -with history of dementia and A&O x 2 at baseline  -start supplementation; suggest changing to sublingual formulary upon discharge to enhance absorption

## 2018-03-19 NOTE — DISCHARGE NOTE ADULT - COMMUNITY RESOURCES
[facility] [phone], next [facility] dialysis session is scheduled for Tuesday 3/20/2018

## 2018-03-19 NOTE — DISCHARGE NOTE ADULT - ADDITIONAL INSTRUCTIONS
Resume your dialysis schedule as directed.  Follow up with your outpatient cardiologist, nephrologist and primary care doctor within 1-2 weeks of hospitalization. Resume your dialysis schedule as directed.  Follow up with your outpatient cardiologist, nephrologist and primary care doctor within 1-2 weeks of hospitalization.  You were provided with a phone number for a vascular surgeon in case there are any further issues with your AV fistula.

## 2018-03-19 NOTE — DISCHARGE NOTE ADULT - CARE PROVIDER_API CALL
Ihsan Saba), Internal Medicine; Nephrology  [address] [place] [address]  Phone: [phone]  Fax: [phone]    Micheal Ibrahim), Surgery; Vascular Surgery  [address] [place] [address]  Phone: [phone]  Fax: [phone]

## 2018-03-19 NOTE — DISCHARGE NOTE ADULT - MEDICATION SUMMARY - MEDICATIONS TO TAKE
I will START or STAY ON the medications listed below when I get home from the hospital:    aspirin 81 mg oral delayed release tablet  -- 1 tab(s) by mouth once a day  -- Indication: For CAD (coronary artery disease)    losartan 100 mg oral tablet  -- 1 tab(s) by mouth once a day  -- Indication: For Hypertension    glimepiride 2 mg oral tablet  -- 1 tab(s) by mouth once a day  -- Indication: For DM (diabetes mellitus)    atorvastatin 40 mg oral tablet  -- 1 tab(s) by mouth once a day (at bedtime)  -- Indication: For CAD (coronary artery disease)    clopidogrel 75 mg oral tablet  -- 1 tab(s) by mouth once a day  -- Indication: For CAD (coronary artery disease)    metoprolol succinate 100 mg oral tablet, extended release  -- 1 tab(s) by mouth once a day  -- Indication: For HTN (hypertension)    NIFEdipine 30 mg oral tablet, extended release  -- 1 tab(s) by mouth once a day (in the morning)  -- Indication: For HTN (hypertension)    NIFEdipine 90 mg oral tablet, extended release  -- 1 tab(s) by mouth once (at bedtime)  -- Indication: For HTN (hypertension)    B-12 1000 mcg oral tablet, extended release  -- 1 tab(s) by mouth once a day   -- Check with your doctor before becoming pregnant.    -- Indication: For Vitamin B12 deficiency I will START or STAY ON the medications listed below when I get home from the hospital:    aspirin 81 mg oral delayed release tablet  -- 1 tab(s) by mouth once a day  -- Indication: For CAD (coronary artery disease)    losartan 100 mg oral tablet  -- 1 tab(s) by mouth once a day  -- Indication: For Hypertension    glimepiride 2 mg oral tablet  -- 1 tab(s) by mouth once a day  -- Indication: For DM (diabetes mellitus)    atorvastatin 40 mg oral tablet  -- 1 tab(s) by mouth once a day (at bedtime)  -- Indication: For CAD (coronary artery disease)    clopidogrel 75 mg oral tablet  -- 1 tab(s) by mouth once a day  -- Indication: For CAD (coronary artery disease)    metoprolol succinate 100 mg oral tablet, extended release  -- 1 tab(s) by mouth once a day  -- Indication: For HTN (hypertension)    NIFEdipine 60 mg oral tablet, extended release  -- 1 tab(s) by mouth once a day at 6:00PM  -- Indication: For HTN (hypertension)    NIFEdipine 60 mg oral tablet, extended release  -- 1 tab(s) by mouth once a day at 6:00AM  -- Indication: For HTN (hypertension)    B-12 1000 mcg oral tablet, extended release  -- 1 tab(s) by mouth once a day   -- Check with your doctor before becoming pregnant.    -- Indication: For Vitamin B12 deficiency

## 2018-03-19 NOTE — DISCHARGE NOTE ADULT - PATIENT PORTAL LINK FT
You can access the [facility] Patient Portal, offered by [facility], by registering with the following website: [email or web address]

## 2018-03-19 NOTE — PROGRESS NOTE ADULT - SUBJECTIVE AND OBJECTIVE BOX
[facility] NEPHROLOGY- PROGRESS NOTE    77 year old male with h/o ESRD on HD presents to the ED with bleeding from fistula site. Renal consulted for ESRD status.     Hospital Medications: Medications reviewed.    REVIEW OF SYSTEMS:  VITALS:  T(F): 98.2 (03-19-18 @ 06:06), Max: 98.6 (03-18-18 @ 21:24)  HR: 60 (03-19-18 @ 07:42)  BP: 149/48 (03-19-18 @ 07:42)  RR: 18 (03-19-18 @ 06:06)  SpO2: 98% (03-19-18 @ 06:06)  Wt(kg): --      CONSTITUTIONAL: No fevers or chills  RESPIRATORY: No shortness of breath  CARDIOVASCULAR: No chest pain.  GASTROINTESTINAL: No nausea, vomiting, diarrhea or abdominal pain.   VASCULAR: No bilateral lower extremity edema      PHYSICAL EXAM:  Gen: NAD, sleepy  Cards: RRR, +S1/S2, no M/G/R  Resp: CTA ant.   GI: soft, NT/ND, NABS  Extremities: no LE edema B/L  Access: Left AVF +thrill +bruit (no active bleeding)      LABS:  03-19    136  |  95<L>  |  33<H>  ----------------------------<  109<H>  4.9   |  27  |  5.36<H>    Ca    7.9<L>      19 Mar 2018 06:15  Phos  2.2     03-18  Mg     2.0     03-18      Creatinine Trend: 5.36 <--, 3.35 <--, 5.30 <--, 5.05 <--                        10.9   8.15  )-----------( 158      ( 19 Mar 2018 06:15 )             33.1

## 2018-03-21 ENCOUNTER — CLINICAL ADVICE (OUTPATIENT)
Age: 78
End: 2018-03-21

## 2018-03-21 ENCOUNTER — APPOINTMENT (OUTPATIENT)
Dept: HOME HEALTH SERVICES | Facility: HOME HEALTH | Age: 78
End: 2018-03-21

## 2018-03-21 VITALS
TEMPERATURE: 97.4 F | DIASTOLIC BLOOD PRESSURE: 64 MMHG | HEART RATE: 78 BPM | RESPIRATION RATE: 16 BRPM | SYSTOLIC BLOOD PRESSURE: 116 MMHG | OXYGEN SATURATION: 98 %

## 2018-03-21 RX ORDER — FENTANYL 25 UG/H
25 PATCH, EXTENDED RELEASE TRANSDERMAL
Qty: 10 | Refills: 0 | Status: ACTIVE | COMMUNITY
Start: 2018-02-07

## 2018-03-21 RX ORDER — ELECTROLYTES/DEXTROSE
SOLUTION, ORAL ORAL DAILY
Qty: 90 | Refills: 3 | Status: ACTIVE | COMMUNITY
Start: 2017-11-15

## 2018-03-21 RX ORDER — BLOOD-GLUCOSE METER
W/DEVICE EACH MISCELLANEOUS
Qty: 1 | Refills: 0 | Status: ACTIVE | COMMUNITY
Start: 2018-01-11

## 2018-03-21 RX ORDER — METOPROLOL TARTRATE 25 MG/1
25 TABLET, FILM COATED ORAL TWICE DAILY
Qty: 45 | Refills: 3 | Status: ACTIVE | COMMUNITY
Start: 2017-11-15

## 2018-03-21 RX ORDER — HYDROMORPHONE HYDROCHLORIDE 2 MG/1
2 TABLET ORAL
Qty: 120 | Refills: 0 | Status: ACTIVE | COMMUNITY
Start: 2017-12-22

## 2018-03-21 RX ORDER — L. ACIDOPHILUS/L.BULGARICUS 1MM CELL
TABLET ORAL
Qty: 90 | Refills: 3 | Status: ACTIVE | COMMUNITY
Start: 2017-11-15

## 2018-03-22 RX ORDER — ACETAMINOPHEN/DIPHENHYDRAMINE 500MG-25MG
1000 TABLET ORAL DAILY
Qty: 30 | Refills: 0 | Status: ACTIVE | COMMUNITY
Start: 2018-03-21

## 2018-03-23 DIAGNOSIS — R69 ILLNESS, UNSPECIFIED: ICD-10-CM

## 2018-03-26 ENCOUNTER — APPOINTMENT (OUTPATIENT)
Dept: HOME HEALTH SERVICES | Facility: HOME HEALTH | Age: 78
End: 2018-03-26
Payer: MEDICARE

## 2018-03-26 VITALS
BODY MASS INDEX: 22.2 KG/M2 | DIASTOLIC BLOOD PRESSURE: 60 MMHG | SYSTOLIC BLOOD PRESSURE: 122 MMHG | OXYGEN SATURATION: 97 % | RESPIRATION RATE: 18 BRPM | HEART RATE: 76 BPM | HEIGHT: 64 IN | TEMPERATURE: 97.5 F | WEIGHT: 130 LBS

## 2018-03-26 DIAGNOSIS — E11.22 TYPE 2 DIABETES MELLITUS WITH DIABETIC CHRONIC KIDNEY DISEASE: ICD-10-CM

## 2018-03-26 DIAGNOSIS — N18.6 TYPE 2 DIABETES MELLITUS WITH DIABETIC CHRONIC KIDNEY DISEASE: ICD-10-CM

## 2018-03-26 DIAGNOSIS — Z99.2 TYPE 2 DIABETES MELLITUS WITH DIABETIC CHRONIC KIDNEY DISEASE: ICD-10-CM

## 2018-03-26 DIAGNOSIS — T82.838A HEMORRHAGE DUE VASCULAR PROSTHETIC DEVICES, IMPLANTS AND GRAFTS, INITIAL ENCOUNTER: ICD-10-CM

## 2018-03-26 PROCEDURE — 99496 TRANSJ CARE MGMT HIGH F2F 7D: CPT

## 2018-04-16 ENCOUNTER — APPOINTMENT (OUTPATIENT)
Dept: HOME HEALTH SERVICES | Facility: HOME HEALTH | Age: 78
End: 2018-04-16
Payer: MEDICARE

## 2018-04-16 ENCOUNTER — MEDICATION RENEWAL (OUTPATIENT)
Age: 78
End: 2018-04-16

## 2018-04-16 VITALS
HEART RATE: 71 BPM | WEIGHT: 130 LBS | BODY MASS INDEX: 22.2 KG/M2 | HEIGHT: 64 IN | SYSTOLIC BLOOD PRESSURE: 118 MMHG | RESPIRATION RATE: 18 BRPM | TEMPERATURE: 97 F | OXYGEN SATURATION: 96 % | DIASTOLIC BLOOD PRESSURE: 68 MMHG

## 2018-04-16 DIAGNOSIS — N18.6 END STAGE RENAL DISEASE: ICD-10-CM

## 2018-04-16 DIAGNOSIS — D63.1 END STAGE RENAL DISEASE: ICD-10-CM

## 2018-04-16 DIAGNOSIS — E11.40 TYPE 2 DIABETES MELLITUS WITH DIABETIC NEUROPATHY, UNSPECIFIED: ICD-10-CM

## 2018-04-16 DIAGNOSIS — Z99.2 END STAGE RENAL DISEASE: ICD-10-CM

## 2018-04-16 DIAGNOSIS — F02.80 ALZHEIMER'S DISEASE WITH LATE ONSET: ICD-10-CM

## 2018-04-16 DIAGNOSIS — M15.0 PRIMARY GENERALIZED (OSTEO)ARTHRITIS: ICD-10-CM

## 2018-04-16 DIAGNOSIS — N18.5 CHRONIC KIDNEY DISEASE, STAGE 5: ICD-10-CM

## 2018-04-16 DIAGNOSIS — G30.1 ALZHEIMER'S DISEASE WITH LATE ONSET: ICD-10-CM

## 2018-04-16 DIAGNOSIS — I34.0 NONRHEUMATIC MITRAL (VALVE) INSUFFICIENCY: ICD-10-CM

## 2018-04-16 PROCEDURE — 99349 HOME/RES VST EST MOD MDM 40: CPT

## 2018-04-16 RX ORDER — NICOTINE POLACRILEX 2 MG
LOZENGE BUCCAL 3 TIMES DAILY
Qty: 90 | Refills: 3 | Status: ACTIVE | COMMUNITY
Start: 2018-01-10 | End: 1900-01-01

## 2018-04-16 RX ORDER — LANCETS 30 GAUGE
EACH MISCELLANEOUS
Qty: 1 | Refills: 3 | Status: ACTIVE | COMMUNITY
Start: 2018-01-10 | End: 1900-01-01

## 2018-04-16 RX ORDER — ASPIRIN 81 MG/1
81 TABLET, CHEWABLE ORAL
Qty: 90 | Refills: 3 | Status: ACTIVE | COMMUNITY
Start: 2018-04-16 | End: 1900-01-01

## 2018-04-16 RX ORDER — VIT B COMP NO.3/FOLIC/C/BIOTIN 1 MG-60 MG
1 TABLET ORAL DAILY
Qty: 90 | Refills: 3 | Status: ACTIVE | COMMUNITY
Start: 2018-04-16 | End: 1900-01-01

## 2018-04-16 RX ORDER — ATORVASTATIN CALCIUM 80 MG/1
80 TABLET, FILM COATED ORAL DAILY
Qty: 90 | Refills: 3 | Status: ACTIVE | COMMUNITY
Start: 2017-11-15 | End: 1900-01-01

## 2018-04-16 RX ORDER — NIFEDIPINE 60 MG/1
60 TABLET, EXTENDED RELEASE ORAL DAILY
Qty: 90 | Refills: 3 | Status: ACTIVE | COMMUNITY
Start: 2017-11-15 | End: 1900-01-01

## 2018-06-04 ENCOUNTER — EMERGENCY (EMERGENCY)
Facility: HOSPITAL | Age: 78
LOS: 1 days | Discharge: HOME | End: 2018-06-04
Attending: EMERGENCY MEDICINE | Admitting: EMERGENCY MEDICINE
Payer: MEDICARE

## 2018-06-04 VITALS
SYSTOLIC BLOOD PRESSURE: 148 MMHG | DIASTOLIC BLOOD PRESSURE: 56 MMHG | HEART RATE: 54 BPM | OXYGEN SATURATION: 97 % | TEMPERATURE: 98 F | RESPIRATION RATE: 18 BRPM

## 2018-06-04 VITALS
DIASTOLIC BLOOD PRESSURE: 48 MMHG | OXYGEN SATURATION: 100 % | RESPIRATION RATE: 18 BRPM | HEART RATE: 102 BPM | SYSTOLIC BLOOD PRESSURE: 146 MMHG

## 2018-06-04 DIAGNOSIS — Z98.89 OTHER SPECIFIED POSTPROCEDURAL STATES: Chronic | ICD-10-CM

## 2018-06-04 PROCEDURE — 73564 X-RAY EXAM KNEE 4 OR MORE: CPT | Mod: 26,LT

## 2018-06-04 PROCEDURE — 70450 CT HEAD/BRAIN W/O DYE: CPT | Mod: 26

## 2018-06-04 PROCEDURE — 99284 EMERGENCY DEPT VISIT MOD MDM: CPT | Mod: GC

## 2018-06-04 PROCEDURE — 73110 X-RAY EXAM OF WRIST: CPT | Mod: 26,LT

## 2018-06-04 NOTE — ED PROVIDER NOTE - OBJECTIVE STATEMENT
76 y/o M hx DM, CAD s/p PCI, ESRD T/Th/S (last Saturday) presents with mechanical fall.    Patient was using wheelchair at 3 AM fell forward onto left side. Assisted to standing position and woke up today with pain and swelling in the left wrist. Wife at bedside noted ecchymosis.     Took tylenol at 330 AM 78 y/o M hx DM, CAD s/p PCI, ESRD T/Th/S (last Saturday) presents with mechanical fall.    Patient was using wheelchair at 3 AM fell forward onto left side making impact with head-no LOC. Assisted to standing position and woke up today with pain and swelling in the left wrist. Wife at bedside noted ecchymosis. Has HHA. Denies any fevers/chills, paresthesias, numbness or loss of motion. Denies dizziness or HA.     Took tylenol at 330 AM

## 2018-06-04 NOTE — ED PROVIDER NOTE - CARE PLAN
Principal Discharge DX:	Fall, initial encounter  Secondary Diagnosis:	Other closed extra-articular fracture of distal end of left radius, initial encounter
COUGH/DIFFICULTY BREATHING

## 2018-06-04 NOTE — ED PROVIDER NOTE - PROGRESS NOTE DETAILS
Xray showing fx of 2nd/3rd/4th metacarpals. Discussed with plastic surgery attending Dr. Higgins. Would recommend volar splint in functional position and have patient follow up in office.

## 2018-06-04 NOTE — ED ADULT NURSE NOTE - OBJECTIVE STATEMENT
Pt received to room 2, Pt states he was using his wheelchair and fell forward earlier this AM. Pt states he fell forward onto his left side. Pt states this AM he started having pain and swelling to L wrist. MD at bedside, will continue to monitor.

## 2018-06-04 NOTE — ED PROVIDER NOTE - MEDICAL DECISION MAKING DETAILS
78 y/o M hx DM, CAD s/p PCI, ESRD T/Th/S (last Saturday) presents with mechanical fall. Ecchymosis and TTP of left hand noted with edema. Neurovascularly intact. Will obtain plain films, CTH, possible Hand/Ortho consult.

## 2018-06-04 NOTE — ED ADULT TRIAGE NOTE - CHIEF COMPLAINT QUOTE
pt BIBA from home, pt had a witnessed fall.  pt c/o left hand and left knee injury., arrives with splint to left hand.

## 2018-06-04 NOTE — ED PROVIDER NOTE - ATTENDING CONTRIBUTION TO CARE
I performed a face to face bedside interview with patient regarding history of present illness, review of symptoms and past medical history. I completed an independent physical exam.  I have discussed patient's plan of care.   I agree with note as stated above, having amended the EMR as needed to reflect my findings. I have discussed the assessment and plan of care.  This includes during the time I functioned as the attending physician for this patient.  Attending Contribution to Care: agree with plan of resident. pt p/w [facility] fall from wheel chair with left sided wrist pain. denies head trauma but pt is aaox2. states fell forward from wheelchair. complaining of left wrist and left knee pain. pt at baseline mental status. wife states would like pt to come home after splint placed. ct head wnl. ortho reduced wrist. I performed a face to face bedside interview with patient regarding history of present illness, review of symptoms and past medical history. I completed an independent physical exam.  I have discussed patient's plan of care.   I agree with note as stated above, having amended the EMR as needed to reflect my findings. I have discussed the assessment and plan of care.  This includes during the time I functioned as the attending physician for this patient.  Attending Contribution to Care: agree with plan of resident. pt p/w [facility] fall from wheel chair with left sided wrist pain. denies head trauma but pt is aaox2. states fell forward from wheelchair. complaining of left wrist and left knee pain. pt at baseline mental status. wife states would like pt to come home after splint placed. ct head wnl. hand reduced finger and placed splint.

## 2018-06-12 ENCOUNTER — INPATIENT (INPATIENT)
Facility: HOSPITAL | Age: 78
LOS: 1 days | Discharge: HOME HEALTH | End: 2018-06-14
Attending: INTERNAL MEDICINE | Admitting: INTERNAL MEDICINE
Payer: MEDICARE

## 2018-06-12 VITALS
HEIGHT: 64 IN | OXYGEN SATURATION: 96 % | RESPIRATION RATE: 17 BRPM | WEIGHT: 130.07 LBS | HEART RATE: 39 BPM | DIASTOLIC BLOOD PRESSURE: 66 MMHG | TEMPERATURE: 98 F | SYSTOLIC BLOOD PRESSURE: 134 MMHG

## 2018-06-12 DIAGNOSIS — N18.6 END STAGE RENAL DISEASE: ICD-10-CM

## 2018-06-12 DIAGNOSIS — E87.5 HYPERKALEMIA: ICD-10-CM

## 2018-06-12 DIAGNOSIS — I25.10 ATHEROSCLEROTIC HEART DISEASE OF NATIVE CORONARY ARTERY WITHOUT ANGINA PECTORIS: ICD-10-CM

## 2018-06-12 DIAGNOSIS — I10 ESSENTIAL (PRIMARY) HYPERTENSION: ICD-10-CM

## 2018-06-12 DIAGNOSIS — R00.1 BRADYCARDIA, UNSPECIFIED: ICD-10-CM

## 2018-06-12 DIAGNOSIS — Z98.89 OTHER SPECIFIED POSTPROCEDURAL STATES: Chronic | ICD-10-CM

## 2018-06-12 DIAGNOSIS — E78.5 HYPERLIPIDEMIA, UNSPECIFIED: ICD-10-CM

## 2018-06-12 LAB
ALBUMIN SERPL ELPH-MCNC: 2.8 G/DL — LOW (ref 3.3–5)
ALP SERPL-CCNC: 46 U/L — SIGNIFICANT CHANGE UP (ref 40–120)
ALT FLD-CCNC: 17 U/L — SIGNIFICANT CHANGE UP (ref 12–78)
ANION GAP SERPL CALC-SCNC: 9 MMOL/L — SIGNIFICANT CHANGE UP (ref 5–17)
APTT BLD: 30.9 SEC — SIGNIFICANT CHANGE UP (ref 27.5–37.4)
AST SERPL-CCNC: 16 U/L — SIGNIFICANT CHANGE UP (ref 15–37)
BASOPHILS # BLD AUTO: 0.06 K/UL — SIGNIFICANT CHANGE UP (ref 0–0.2)
BASOPHILS NFR BLD AUTO: 0.7 % — SIGNIFICANT CHANGE UP (ref 0–2)
BILIRUB SERPL-MCNC: 0.5 MG/DL — SIGNIFICANT CHANGE UP (ref 0.2–1.2)
BUN SERPL-MCNC: 59 MG/DL — HIGH (ref 7–23)
CALCIUM SERPL-MCNC: 7.5 MG/DL — LOW (ref 8.5–10.1)
CHLORIDE SERPL-SCNC: 103 MMOL/L — SIGNIFICANT CHANGE UP (ref 96–108)
CO2 SERPL-SCNC: 27 MMOL/L — SIGNIFICANT CHANGE UP (ref 22–31)
CREAT SERPL-MCNC: 8.46 MG/DL — HIGH (ref 0.5–1.3)
EOSINOPHIL # BLD AUTO: 0.27 K/UL — SIGNIFICANT CHANGE UP (ref 0–0.5)
EOSINOPHIL NFR BLD AUTO: 3.1 % — SIGNIFICANT CHANGE UP (ref 0–6)
GLUCOSE BLDC GLUCOMTR-MCNC: 113 MG/DL — HIGH (ref 70–99)
GLUCOSE SERPL-MCNC: 182 MG/DL — HIGH (ref 70–99)
HCT VFR BLD CALC: 29.1 % — LOW (ref 39–50)
HGB BLD-MCNC: 9 G/DL — LOW (ref 13–17)
IMM GRANULOCYTES NFR BLD AUTO: 0.3 % — SIGNIFICANT CHANGE UP (ref 0–1.5)
INR BLD: 1.22 RATIO — HIGH (ref 0.88–1.16)
LYMPHOCYTES # BLD AUTO: 1.62 K/UL — SIGNIFICANT CHANGE UP (ref 1–3.3)
LYMPHOCYTES # BLD AUTO: 18.8 % — SIGNIFICANT CHANGE UP (ref 13–44)
MCHC RBC-ENTMCNC: 27.5 PG — SIGNIFICANT CHANGE UP (ref 27–34)
MCHC RBC-ENTMCNC: 30.9 GM/DL — LOW (ref 32–36)
MCV RBC AUTO: 89 FL — SIGNIFICANT CHANGE UP (ref 80–100)
MONOCYTES # BLD AUTO: 0.73 K/UL — SIGNIFICANT CHANGE UP (ref 0–0.9)
MONOCYTES NFR BLD AUTO: 8.5 % — SIGNIFICANT CHANGE UP (ref 2–14)
NEUTROPHILS # BLD AUTO: 5.92 K/UL — SIGNIFICANT CHANGE UP (ref 1.8–7.4)
NEUTROPHILS NFR BLD AUTO: 68.6 % — SIGNIFICANT CHANGE UP (ref 43–77)
NRBC # BLD: 0 /100 WBCS — SIGNIFICANT CHANGE UP (ref 0–0)
PLATELET # BLD AUTO: 240 K/UL — SIGNIFICANT CHANGE UP (ref 150–400)
POTASSIUM SERPL-MCNC: 6.7 MMOL/L — CRITICAL HIGH (ref 3.5–5.3)
POTASSIUM SERPL-SCNC: 6.7 MMOL/L — CRITICAL HIGH (ref 3.5–5.3)
PROT SERPL-MCNC: 7.4 GM/DL — SIGNIFICANT CHANGE UP (ref 6–8.3)
PROTHROM AB SERPL-ACNC: 13.4 SEC — HIGH (ref 9.8–12.7)
RBC # BLD: 3.27 M/UL — LOW (ref 4.2–5.8)
RBC # FLD: 16.7 % — HIGH (ref 10.3–14.5)
SODIUM SERPL-SCNC: 139 MMOL/L — SIGNIFICANT CHANGE UP (ref 135–145)
WBC # BLD: 8.63 K/UL — SIGNIFICANT CHANGE UP (ref 3.8–10.5)
WBC # FLD AUTO: 8.63 K/UL — SIGNIFICANT CHANGE UP (ref 3.8–10.5)

## 2018-06-12 PROCEDURE — 71045 X-RAY EXAM CHEST 1 VIEW: CPT | Mod: 26

## 2018-06-12 PROCEDURE — 73110 X-RAY EXAM OF WRIST: CPT | Mod: 26,LT

## 2018-06-12 PROCEDURE — 99223 1ST HOSP IP/OBS HIGH 75: CPT

## 2018-06-12 PROCEDURE — 99285 EMERGENCY DEPT VISIT HI MDM: CPT

## 2018-06-12 RX ORDER — DEXTROSE 50 % IN WATER 50 %
25 SYRINGE (ML) INTRAVENOUS ONCE
Qty: 0 | Refills: 0 | Status: DISCONTINUED | OUTPATIENT
Start: 2018-06-12 | End: 2018-06-14

## 2018-06-12 RX ORDER — NIFEDIPINE 30 MG
60 TABLET, EXTENDED RELEASE 24 HR ORAL DAILY
Qty: 0 | Refills: 0 | Status: DISCONTINUED | OUTPATIENT
Start: 2018-06-12 | End: 2018-06-14

## 2018-06-12 RX ORDER — SODIUM CHLORIDE 9 MG/ML
1000 INJECTION, SOLUTION INTRAVENOUS
Qty: 0 | Refills: 0 | Status: DISCONTINUED | OUTPATIENT
Start: 2018-06-12 | End: 2018-06-14

## 2018-06-12 RX ORDER — ASPIRIN/CALCIUM CARB/MAGNESIUM 324 MG
81 TABLET ORAL DAILY
Qty: 0 | Refills: 0 | Status: DISCONTINUED | OUTPATIENT
Start: 2018-06-12 | End: 2018-06-14

## 2018-06-12 RX ORDER — ATORVASTATIN CALCIUM 80 MG/1
40 TABLET, FILM COATED ORAL AT BEDTIME
Qty: 0 | Refills: 0 | Status: DISCONTINUED | OUTPATIENT
Start: 2018-06-12 | End: 2018-06-14

## 2018-06-12 RX ORDER — METOPROLOL TARTRATE 50 MG
50 TABLET ORAL DAILY
Qty: 0 | Refills: 0 | Status: DISCONTINUED | OUTPATIENT
Start: 2018-06-13 | End: 2018-06-14

## 2018-06-12 RX ORDER — INSULIN LISPRO 100/ML
VIAL (ML) SUBCUTANEOUS
Qty: 0 | Refills: 0 | Status: DISCONTINUED | OUTPATIENT
Start: 2018-06-12 | End: 2018-06-14

## 2018-06-12 RX ORDER — LOSARTAN POTASSIUM 100 MG/1
100 TABLET, FILM COATED ORAL DAILY
Qty: 0 | Refills: 0 | Status: DISCONTINUED | OUTPATIENT
Start: 2018-06-12 | End: 2018-06-14

## 2018-06-12 RX ORDER — INSULIN LISPRO 100/ML
VIAL (ML) SUBCUTANEOUS AT BEDTIME
Qty: 0 | Refills: 0 | Status: DISCONTINUED | OUTPATIENT
Start: 2018-06-12 | End: 2018-06-14

## 2018-06-12 RX ORDER — GLUCAGON INJECTION, SOLUTION 0.5 MG/.1ML
1 INJECTION, SOLUTION SUBCUTANEOUS ONCE
Qty: 0 | Refills: 0 | Status: DISCONTINUED | OUTPATIENT
Start: 2018-06-12 | End: 2018-06-14

## 2018-06-12 RX ORDER — DEXTROSE 50 % IN WATER 50 %
12.5 SYRINGE (ML) INTRAVENOUS ONCE
Qty: 0 | Refills: 0 | Status: DISCONTINUED | OUTPATIENT
Start: 2018-06-12 | End: 2018-06-14

## 2018-06-12 RX ORDER — CLOPIDOGREL BISULFATE 75 MG/1
75 TABLET, FILM COATED ORAL DAILY
Qty: 0 | Refills: 0 | Status: DISCONTINUED | OUTPATIENT
Start: 2018-06-12 | End: 2018-06-14

## 2018-06-12 RX ORDER — DEXTROSE 50 % IN WATER 50 %
15 SYRINGE (ML) INTRAVENOUS ONCE
Qty: 0 | Refills: 0 | Status: DISCONTINUED | OUTPATIENT
Start: 2018-06-12 | End: 2018-06-14

## 2018-06-12 RX ADMIN — ATORVASTATIN CALCIUM 40 MILLIGRAM(S): 80 TABLET, FILM COATED ORAL at 23:46

## 2018-06-12 RX ADMIN — Medication 0: at 23:45

## 2018-06-12 NOTE — H&P ADULT - HISTORY OF PRESENT ILLNESS
: 76 yo male with htn, dm, CKD on dialysis presents from dialysis center for evaluation of bradycardia today with home aide. Patient denies any complaints at this time. Patient took morning medications today. Patient did not complete dialysis today. Last dialysis was 3 days ago. : 78 yo male with htn, dm, CKD on dialysis presents from dialysis center for evaluation of bradycardia today with home aide. Patient denies any complaints at this time. Patient took morning medications today. Patient did not complete dialysis today. Last dialysis was 3 days ago. his heart rate was 30

## 2018-06-12 NOTE — ED PROVIDER NOTE - MEDICAL DECISION MAKING DETAILS
Patient with ckd on dialysis, left hand fracture presents with bradycardia from dialysis;will admit Heaven French, cardiology aware

## 2018-06-12 NOTE — H&P ADULT - ASSESSMENT
77f with history of end stage renal disease with bradycardia     IMPROVE VTE Individual Risk Assessment        RISK                                                          Points  [  ] Previous VTE                                                3  [  ] Thrombophilia                                             2  [  ] Lower limb paralysis                                   2        (unable to hold up >15 seconds)    [  ] Current Cancer                                            2         (within 6 months)  [x  ] Immobilization > 24 hrs                              1  [  ] ICU/CCU stay > 24 hours                            1  [ x ] Age > 60                2                                    1  IMPROVE VTE Score _________

## 2018-06-12 NOTE — ED PROVIDER NOTE - OBJECTIVE STATEMENT
78 yo male with htn, dm, CKD on dialysis presents from dialysis center for evaluation of bradycardia today with home aide. Patient denies any complaints at this time. Patient took morning medications today. Patient did not complete dialysis today. Last dialysis was 3 days ago.

## 2018-06-12 NOTE — H&P ADULT - NSHPLABSRESULTS_GEN_ALL_CORE
9.0    8.63  )-----------( 240      ( 12 Jun 2018 17:19 )             29.1   06-12    139  |  103  |  59<H>  ----------------------------<  182<H>  6.7<HH>   |  27  |  8.46<H>    Ca    7.5<L>      12 Jun 2018 17:26    TPro  7.4  /  Alb  2.8<L>  /  TBili  0.5  /  DBili  x   /  AST  16  /  ALT  17  /  AlkPhos  46  06-12  < from: Xray Chest 1 View-PORTABLE IMMEDIATE (06.12.18 @ 17:31) >    IMPRESSION: Extension of left-sided stents.  Loop recorder new since 2017. Mild bibasilar effusions.    < from: Xray Wrist 3 Views, Left (06.12.18 @ 17:31) >    Presently there is a splint applied obscuring detail. Alignment is   unchanged.    IMPRESSION: Casting.        < end of copied text >

## 2018-06-12 NOTE — ED ADULT TRIAGE NOTE - CHIEF COMPLAINT QUOTE
Pt with slow heart rate 30's he went to dialysis and the heart rate noted to be low. He was not dialyzed 911 called and he ws transport to the ed. Pt has a cast to left hand The fingers are swollen . Dialysis  access to left arm

## 2018-06-13 LAB
ANION GAP SERPL CALC-SCNC: 9 MMOL/L — SIGNIFICANT CHANGE UP (ref 5–17)
BUN SERPL-MCNC: 23 MG/DL — SIGNIFICANT CHANGE UP (ref 7–23)
CALCIUM SERPL-MCNC: 7.9 MG/DL — LOW (ref 8.5–10.1)
CHLORIDE SERPL-SCNC: 104 MMOL/L — SIGNIFICANT CHANGE UP (ref 96–108)
CO2 SERPL-SCNC: 30 MMOL/L — SIGNIFICANT CHANGE UP (ref 22–31)
CREAT SERPL-MCNC: 4.41 MG/DL — HIGH (ref 0.5–1.3)
GLUCOSE BLDC GLUCOMTR-MCNC: 140 MG/DL — HIGH (ref 70–99)
GLUCOSE SERPL-MCNC: 132 MG/DL — HIGH (ref 70–99)
HAV IGM SER-ACNC: SIGNIFICANT CHANGE UP
HBA1C BLD-MCNC: 5.6 % — SIGNIFICANT CHANGE UP (ref 4–5.6)
HBV CORE IGM SER-ACNC: SIGNIFICANT CHANGE UP
HBV SURFACE AB SER-ACNC: REACTIVE
HBV SURFACE AG SER-ACNC: SIGNIFICANT CHANGE UP
HCT VFR BLD CALC: 28.2 % — LOW (ref 39–50)
HCV AB S/CO SERPL IA: 0.14 S/CO — SIGNIFICANT CHANGE UP
HCV AB SERPL-IMP: SIGNIFICANT CHANGE UP
HGB BLD-MCNC: 8.7 G/DL — LOW (ref 13–17)
MCHC RBC-ENTMCNC: 27.4 PG — SIGNIFICANT CHANGE UP (ref 27–34)
MCHC RBC-ENTMCNC: 30.9 GM/DL — LOW (ref 32–36)
MCV RBC AUTO: 88.7 FL — SIGNIFICANT CHANGE UP (ref 80–100)
NRBC # BLD: 0 /100 WBCS — SIGNIFICANT CHANGE UP (ref 0–0)
PLATELET # BLD AUTO: 235 K/UL — SIGNIFICANT CHANGE UP (ref 150–400)
POTASSIUM SERPL-MCNC: 4.3 MMOL/L — SIGNIFICANT CHANGE UP (ref 3.5–5.3)
POTASSIUM SERPL-SCNC: 4.3 MMOL/L — SIGNIFICANT CHANGE UP (ref 3.5–5.3)
RBC # BLD: 3.18 M/UL — LOW (ref 4.2–5.8)
RBC # FLD: 16.4 % — HIGH (ref 10.3–14.5)
SODIUM SERPL-SCNC: 143 MMOL/L — SIGNIFICANT CHANGE UP (ref 135–145)
WBC # BLD: 6.8 K/UL — SIGNIFICANT CHANGE UP (ref 3.8–10.5)
WBC # FLD AUTO: 6.8 K/UL — SIGNIFICANT CHANGE UP (ref 3.8–10.5)

## 2018-06-13 PROCEDURE — 93010 ELECTROCARDIOGRAM REPORT: CPT

## 2018-06-13 PROCEDURE — 99233 SBSQ HOSP IP/OBS HIGH 50: CPT

## 2018-06-13 PROCEDURE — 99223 1ST HOSP IP/OBS HIGH 75: CPT

## 2018-06-13 RX ORDER — DEXTROSE 50 % IN WATER 50 %
12.5 SYRINGE (ML) INTRAVENOUS ONCE
Qty: 0 | Refills: 0 | Status: COMPLETED | OUTPATIENT
Start: 2018-06-13 | End: 2018-06-13

## 2018-06-13 RX ORDER — LOSARTAN POTASSIUM 100 MG/1
100 TABLET, FILM COATED ORAL ONCE
Qty: 0 | Refills: 0 | Status: DISCONTINUED | OUTPATIENT
Start: 2018-06-13 | End: 2018-06-13

## 2018-06-13 RX ORDER — HYDRALAZINE HCL 50 MG
10 TABLET ORAL EVERY 8 HOURS
Qty: 0 | Refills: 0 | Status: DISCONTINUED | OUTPATIENT
Start: 2018-06-13 | End: 2018-06-14

## 2018-06-13 RX ORDER — HYDRALAZINE HCL 50 MG
10 TABLET ORAL ONCE
Qty: 0 | Refills: 0 | Status: COMPLETED | OUTPATIENT
Start: 2018-06-13 | End: 2018-06-13

## 2018-06-13 RX ORDER — LOSARTAN POTASSIUM 100 MG/1
100 TABLET, FILM COATED ORAL ONCE
Qty: 0 | Refills: 0 | Status: COMPLETED | OUTPATIENT
Start: 2018-06-13 | End: 2018-06-13

## 2018-06-13 RX ADMIN — Medication 60 MILLIGRAM(S): at 05:29

## 2018-06-13 RX ADMIN — Medication 4: at 18:57

## 2018-06-13 RX ADMIN — Medication 10 MILLIGRAM(S): at 11:10

## 2018-06-13 RX ADMIN — Medication 81 MILLIGRAM(S): at 11:11

## 2018-06-13 RX ADMIN — Medication 12.5 GRAM(S): at 22:35

## 2018-06-13 RX ADMIN — ATORVASTATIN CALCIUM 40 MILLIGRAM(S): 80 TABLET, FILM COATED ORAL at 22:10

## 2018-06-13 RX ADMIN — CLOPIDOGREL BISULFATE 75 MILLIGRAM(S): 75 TABLET, FILM COATED ORAL at 11:10

## 2018-06-13 RX ADMIN — Medication 50 MILLIGRAM(S): at 05:29

## 2018-06-13 RX ADMIN — LOSARTAN POTASSIUM 100 MILLIGRAM(S): 100 TABLET, FILM COATED ORAL at 01:35

## 2018-06-13 RX ADMIN — Medication 10 MILLIGRAM(S): at 22:10

## 2018-06-13 NOTE — CONSULT NOTE ADULT - ASSESSMENT
76 yo male with htn, dm, CKD on dialysis presents from dialysis center for evaluation of bradycardia. Patient denied any complaints at that time.  Had taken meds but HD not completed 2/2 bradycardia in the 30s.   Found to be naima in the 30-40s in ER with K 6.3.  EKG sinus naima 39bpm with peaked T waves.  Received HD while in hospital.    Subsequent K 4.3 with tele: HRs sinus 70s with 1st degree AVB.  Back to baseline currently.  No K sparing meds on list; no new med changes.  Metoprolol decreased anow hypertensive to 220s.    Bradycardia likely secondary to electrolyte disturbances and not bbs. Remains very hypertensive today.    -recommend going back to home metoprolol dose  -cont to monitor HRs on tele  -cont rest of cardiac meds; hydralazine added today for improved BP control

## 2018-06-13 NOTE — PATIENT PROFILE ADULT. - FALL HARM RISK
other/bones(Osteoporosis,prev fx,steroid use,metastatic bone ca/left arm cast s/p fall three days ago

## 2018-06-13 NOTE — CONSULT NOTE ADULT - SUBJECTIVE AND OBJECTIVE BOX
CARDIOLOGY CONSULT NOTE    Patient is a 77y Male with a known history of :  CAD (coronary artery disease) (I25.10)  Hyperlipidemia, unspecified hyperlipidemia type (E78.5)  Essential hypertension (I10)  ESRD (end stage renal disease) (N18.6)  Hyperkalemia (E87.5)  Bradycardia (R00.1)    HPI:  78 yo male with htn, dm, CKD on dialysis presents from dialysis center for evaluation of bradycardia. Patient denied any complaints at that time.  Had taken meds but HD not completed 2/2 bradycardia in the 30s.   Found to be naima in the 30-40s in ER with K 6.3.  EKG sinus naima 39bpm with peaked T waves.  Received HD while in hospital.    Subsequent K 4.3 with tele: HRs sinus 70s with 1st degree AVB.  Back to baseline currently.  No K sparing meds on list; no new med changes.  Metoprolol decreased anow hypertensive to 220s.    REVIEW OF SYSTEMS:    CONSTITUTIONAL: No fever, weight loss, or fatigue  EYES: No eye pain, visual disturbances, or discharge  ENMT:  No difficulty hearing, tinnitus, vertigo; No sinus or throat pain  NECK: No pain or stiffness  BREASTS: No pain, masses, or nipple discharge  RESPIRATORY: No cough, wheezing, chills or hemoptysis; No shortness of breath  CARDIOVASCULAR: No chest pain, palpitations, dizziness, or leg swelling  GASTROINTESTINAL: No abdominal or epigastric pain. No nausea, vomiting, or hematemesis; No diarrhea or constipation. No melena or hematochezia.  GENITOURINARY: No dysuria, frequency, hematuria, or incontinence  NEUROLOGICAL: No headaches, memory loss, loss of strength, numbness, or tremors  SKIN: No itching, burning, rashes, or lesions   LYMPH NODES: No enlarged glands  ENDOCRINE: No heat or cold intolerance; No hair loss  MUSCULOSKELETAL: No joint pain or swelling; No muscle, back, or extremity pain  PSYCHIATRIC: No depression, anxiety, mood swings, or difficulty sleeping  HEME/LYMPH: No easy bruising, or bleeding gums  ALLERGY AND IMMUNOLOGIC: No hives or eczema    MEDICATIONS  (STANDING):  aspirin enteric coated 81 milliGRAM(s) Oral daily  atorvastatin 40 milliGRAM(s) Oral at bedtime  clopidogrel Tablet 75 milliGRAM(s) Oral daily  hydrALAZINE 10 milliGRAM(s) Oral every 8 hours  insulin lispro (HumaLOG) corrective regimen sliding scale   SubCutaneous three times a day before meals  insulin lispro (HumaLOG) corrective regimen sliding scale   SubCutaneous at bedtime  losartan 100 milliGRAM(s) Oral daily  metoprolol succinate ER 50 milliGRAM(s) Oral daily  NIFEdipine XL 60 milliGRAM(s) Oral daily    MEDICATIONS  (PRN):  dextrose 40% Gel 15 Gram(s) Oral once PRN Blood Glucose LESS THAN 70 milliGRAM(s)/deciliter  glucagon  Injectable 1 milliGRAM(s) IntraMuscular once PRN Glucose LESS THAN 70 milligrams/deciliter      ALLERGIES: No Known Allergies      FAMILY HISTORY:  No pertinent family history in first degree relatives      PHYSICAL EXAMINATION:  -----------------------------  T(C): 36.6 (06-13-18 @ 05:32), Max: 36.6 (06-12-18 @ 19:20)  HR: 71 (06-13-18 @ 05:32) (39 - 74)  BP: 224/72 (06-13-18 @ 09:43) (134/66 - 224/72)  RR: 18 (06-13-18 @ 05:32) (14 - 18)  SpO2: 97% (06-13-18 @ 05:32) (96% - 99%)  Wt(kg): --    06-12 @ 07:01  -  06-13 @ 07:00  --------------------------------------------------------  IN:  Total IN: 0 mL    OUT:    Other: 1500 mL  Total OUT: 1500 mL    Total NET: -1500 mL        Height (cm): 162.56 (06-13 @ 00:44)  Weight (kg): 53.9 (06-13 @ 00:44)  BMI (kg/m2): 20.4 (06-13 @ 00:44)  BSA (m2): 1.57 (06-13 @ 00:44)    Constitutional: frail appeaqring but in no acute distress.   Eyes: the conjunctiva exhibited no abnormalities and the eyelids demonstrated no xanthelasmas.   HEENT: normal oral mucosa, no oral pallor and no oral cyanosis.   Neck: normal jugular venous A waves present, normal jugular venous V waves present and no jugular venous mcgee A waves.   Pulmonary: no respiratory distress, normal respiratory rhythm and effort, no accessory muscle use and lungs were clear to auscultation bilaterally.   Cardiovascular: heart rate and rhythm were normal, normal S1 and S2 and no murmur, gallop, rub, heave or thrill are present.   Abdomen: soft, non-tender, no hepato-splenomegaly and no abdominal mass palpated.   Musculoskeletal: the gait could not be assessed..   Extremities: no clubbing of the fingernails, no localized cyanosis, no petechial hemorrhages and no ischemic changes.   Skin: normal skin color and pigmentation, no rash, no venous stasis, no skin lesions, no skin ulcer and no xanthoma was observed.   Psychiatric: oriented to person, place, and time, the affect was normal, the mood was normal and not feeling anxious.     LABS:   --------  06-13    143  |  104  |  23  ----------------------------<  132<H>  4.3   |  30  |  4.41<H>    Ca    7.9<L>      13 Jun 2018 06:28    TPro  7.4  /  Alb  2.8<L>  /  TBili  0.5  /  DBili  x   /  AST  16  /  ALT  17  /  AlkPhos  46  06-12                         8.7    6.80  )-----------( 235      ( 13 Jun 2018 06:28 )             28.2     PT/INR - ( 12 Jun 2018 17:19 )   PT: 13.4 sec;   INR: 1.22 ratio         PTT - ( 12 Jun 2018 17:19 )  PTT:30.9 sec            RADIOLOGY:  -----------------  < from: TTE Echo Doppler w/o Cont (09.10.17 @ 09:03) >   Summary:   1. Left ventricular ejection fraction, by visual estimation, is 65 to   70%.   2. There is mild asymmetric left ventricular hypertrophy.   3. Mild mitral valve regurgitation.   4. Mild aortic regurgitation.   5. Mildly enlarged left atrium.   6. Mild pulmonic valve regurgitation.    < end of copied text >
"Patient is a 77y old  Male who presents with a chief complaint of bradycardia prior to starting HD  HPI: Patient with noted bradycardia pre hd, sent to ER;     EKG with peaked T; poor P wave, with HR 39';   "    PAST MEDICAL & SURGICAL HISTORY:  Dementia  Thrombocytopenia  PVD (peripheral vascular disease)  CAD (coronary artery disease): s/p 1 stent  HLD (hyperlipidemia)  HTN (hypertension)  DM (diabetes mellitus)  ESRD (end stage renal disease)  H/O foot surgery: sec to ankle fracture  S/P cataract extraction  S/P coronary artery stent placement  S/P cholecystectomy  AVF (arteriovenous fistula)    FAMILY HISTORY:  No pertinent family history in first degree relatives    Allergies    No Known Allergies    Intolerances      Home Medications:  aspirin 81 mg oral delayed release tablet: 1 tab(s) orally once a day (12 Jun 2018 16:46)  clopidogrel 75 mg oral tablet: 1 tab(s) orally once a day (12 Jun 2018 16:46)  glimepiride 2 mg oral tablet: 1 tab(s) orally once a day (12 Jun 2018 16:46)  losartan 100 mg oral tablet: 1 tab(s) orally once a day (12 Jun 2018 16:46)  metoprolol succinate 100 mg oral tablet, extended release: 1 tab(s) orally once a day (17 Mar 2018 05:46)    MEDICATIONS  (STANDING):    MEDICATIONS  (PRN):    Vital Signs Last 24 Hrs  T(C): 36.4 (12 Jun 2018 16:26), Max: 36.4 (12 Jun 2018 16:26)  T(F): 97.6 (12 Jun 2018 16:26), Max: 97.6 (12 Jun 2018 16:26)  HR: 39 (12 Jun 2018 16:26) (39 - 39)  BP: 134/66 (12 Jun 2018 16:26) (134/66 - 134/66)  BP(mean): --  RR: 17 (12 Jun 2018 16:26) (17 - 17)  SpO2: 96% (12 Jun 2018 16:26) (96% - 96%)    Daily Height in cm: 162.56 (12 Jun 2018 16:26)    Daily     CAPILLARY BLOOD GLUCOSE        PHYSICAL EXAM:      T(C): 36.4 (06-12-18 @ 16:26), Max: 36.4 (06-12-18 @ 16:26)  HR: 39 (06-12-18 @ 16:26) (39 - 39)  BP: 134/66 (06-12-18 @ 16:26) (134/66 - 134/66)  RR: 17 (06-12-18 @ 16:26) (17 - 17)  SpO2: 96% (06-12-18 @ 16:26) (96% - 96%)  Wt(kg): --  Respiratory: clear anteriorly, decreased BS at bases  Cardiovascular: S1 S2  Gastrointestinal: soft NT ND +BS  Extremities:   tr edema              Assessment and Plan:  ESRD with bradycardia pattern suggestive of severe hyperkalemia;  Will dialyze tonight; 2 k bath; assess pre HD K;   Hold Metoprolol;   Cardio eval  Will follow.

## 2018-06-14 ENCOUNTER — APPOINTMENT (OUTPATIENT)
Dept: ELECTROPHYSIOLOGY | Facility: CLINIC | Age: 78
End: 2018-06-14

## 2018-06-14 ENCOUNTER — APPOINTMENT (OUTPATIENT)
Dept: HOME HEALTH SERVICES | Facility: HOME HEALTH | Age: 78
End: 2018-06-14

## 2018-06-14 ENCOUNTER — TRANSCRIPTION ENCOUNTER (OUTPATIENT)
Age: 78
End: 2018-06-14

## 2018-06-14 VITALS — SYSTOLIC BLOOD PRESSURE: 165 MMHG | HEART RATE: 78 BPM | DIASTOLIC BLOOD PRESSURE: 61 MMHG

## 2018-06-14 LAB
ANION GAP SERPL CALC-SCNC: 8 MMOL/L — SIGNIFICANT CHANGE UP (ref 5–17)
BUN SERPL-MCNC: 30 MG/DL — HIGH (ref 7–23)
CALCIUM SERPL-MCNC: 7.2 MG/DL — LOW (ref 8.5–10.1)
CHLORIDE SERPL-SCNC: 104 MMOL/L — SIGNIFICANT CHANGE UP (ref 96–108)
CO2 SERPL-SCNC: 28 MMOL/L — SIGNIFICANT CHANGE UP (ref 22–31)
CREAT SERPL-MCNC: 5.15 MG/DL — HIGH (ref 0.5–1.3)
GLUCOSE SERPL-MCNC: 179 MG/DL — HIGH (ref 70–99)
HCT VFR BLD CALC: 26.8 % — LOW (ref 39–50)
HGB BLD-MCNC: 8.6 G/DL — LOW (ref 13–17)
MCHC RBC-ENTMCNC: 28.4 PG — SIGNIFICANT CHANGE UP (ref 27–34)
MCHC RBC-ENTMCNC: 32.1 GM/DL — SIGNIFICANT CHANGE UP (ref 32–36)
MCV RBC AUTO: 88.4 FL — SIGNIFICANT CHANGE UP (ref 80–100)
NRBC # BLD: 0 /100 WBCS — SIGNIFICANT CHANGE UP (ref 0–0)
PLATELET # BLD AUTO: 217 K/UL — SIGNIFICANT CHANGE UP (ref 150–400)
POTASSIUM SERPL-MCNC: 4.3 MMOL/L — SIGNIFICANT CHANGE UP (ref 3.5–5.3)
POTASSIUM SERPL-SCNC: 4.3 MMOL/L — SIGNIFICANT CHANGE UP (ref 3.5–5.3)
RBC # BLD: 3.03 M/UL — LOW (ref 4.2–5.8)
RBC # FLD: 16.2 % — HIGH (ref 10.3–14.5)
SODIUM SERPL-SCNC: 140 MMOL/L — SIGNIFICANT CHANGE UP (ref 135–145)
WBC # BLD: 6.43 K/UL — SIGNIFICANT CHANGE UP (ref 3.8–10.5)
WBC # FLD AUTO: 6.43 K/UL — SIGNIFICANT CHANGE UP (ref 3.8–10.5)

## 2018-06-14 PROCEDURE — 99239 HOSP IP/OBS DSCHRG MGMT >30: CPT

## 2018-06-14 RX ORDER — HYDRALAZINE HCL 50 MG
1 TABLET ORAL
Qty: 0 | Refills: 0 | COMMUNITY
Start: 2018-06-14 | End: 2018-07-13

## 2018-06-14 RX ORDER — METOPROLOL TARTRATE 50 MG
1 TABLET ORAL
Qty: 0 | Refills: 0 | COMMUNITY

## 2018-06-14 RX ORDER — HYDRALAZINE HCL 50 MG
1 TABLET ORAL
Qty: 120 | Refills: 0 | OUTPATIENT
Start: 2018-06-14 | End: 2018-07-13

## 2018-06-14 RX ORDER — CARVEDILOL PHOSPHATE 80 MG/1
1 CAPSULE, EXTENDED RELEASE ORAL
Qty: 0 | Refills: 0 | COMMUNITY
Start: 2018-06-14 | End: 2018-07-13

## 2018-06-14 RX ORDER — HYDRALAZINE HCL 50 MG
10 TABLET ORAL
Qty: 0 | Refills: 0 | Status: DISCONTINUED | OUTPATIENT
Start: 2018-06-14 | End: 2018-06-14

## 2018-06-14 RX ORDER — CARVEDILOL PHOSPHATE 80 MG/1
1 CAPSULE, EXTENDED RELEASE ORAL
Qty: 60 | Refills: 0 | OUTPATIENT
Start: 2018-06-14 | End: 2018-07-13

## 2018-06-14 RX ORDER — CARVEDILOL PHOSPHATE 80 MG/1
12.5 CAPSULE, EXTENDED RELEASE ORAL EVERY 12 HOURS
Qty: 0 | Refills: 0 | Status: DISCONTINUED | OUTPATIENT
Start: 2018-06-14 | End: 2018-06-14

## 2018-06-14 RX ADMIN — Medication 81 MILLIGRAM(S): at 11:36

## 2018-06-14 RX ADMIN — Medication 60 MILLIGRAM(S): at 05:44

## 2018-06-14 RX ADMIN — Medication 50 MILLIGRAM(S): at 05:44

## 2018-06-14 RX ADMIN — Medication 10 MILLIGRAM(S): at 05:44

## 2018-06-14 RX ADMIN — CLOPIDOGREL BISULFATE 75 MILLIGRAM(S): 75 TABLET, FILM COATED ORAL at 11:37

## 2018-06-14 RX ADMIN — Medication 10 MILLIGRAM(S): at 17:55

## 2018-06-14 RX ADMIN — Medication 8: at 11:36

## 2018-06-14 RX ADMIN — Medication 2: at 07:49

## 2018-06-14 RX ADMIN — Medication 10 MILLIGRAM(S): at 11:37

## 2018-06-14 RX ADMIN — LOSARTAN POTASSIUM 100 MILLIGRAM(S): 100 TABLET, FILM COATED ORAL at 05:44

## 2018-06-14 RX ADMIN — CARVEDILOL PHOSPHATE 12.5 MILLIGRAM(S): 80 CAPSULE, EXTENDED RELEASE ORAL at 17:55

## 2018-06-14 NOTE — PROGRESS NOTE ADULT - SUBJECTIVE AND OBJECTIVE BOX
Patient feels well no complaints today.    MEDICATIONS  (STANDING):  aspirin enteric coated 81 milliGRAM(s) Oral daily  atorvastatin 40 milliGRAM(s) Oral at bedtime  clopidogrel Tablet 75 milliGRAM(s) Oral daily  dextrose 5%. 1000 milliLiter(s) (50 mL/Hr) IV Continuous <Continuous>  dextrose 50% Injectable 12.5 Gram(s) IV Push once  dextrose 50% Injectable 25 Gram(s) IV Push once  dextrose 50% Injectable 25 Gram(s) IV Push once  hydrALAZINE 10 milliGRAM(s) Oral every 8 hours  insulin lispro (HumaLOG) corrective regimen sliding scale   SubCutaneous three times a day before meals  insulin lispro (HumaLOG) corrective regimen sliding scale   SubCutaneous at bedtime  losartan 100 milliGRAM(s) Oral daily  metoprolol succinate ER 50 milliGRAM(s) Oral daily  NIFEdipine XL 60 milliGRAM(s) Oral daily    MEDICATIONS  (PRN):  dextrose 40% Gel 15 Gram(s) Oral once PRN Blood Glucose LESS THAN 70 milliGRAM(s)/deciliter  glucagon  Injectable 1 milliGRAM(s) IntraMuscular once PRN Glucose LESS THAN 70 milligrams/deciliter      06-12-18 @ 07:01  -  06-13-18 @ 07:00  --------------------------------------------------------  IN: 0 mL / OUT: 1500 mL / NET: -1500 mL    06-13-18 @ 07:01  -  06-13-18 @ 15:23  --------------------------------------------------------  IN: 360 mL / OUT: 0 mL / NET: 360 mL      PHYSICAL EXAM:      T(C): 35.6 (06-13-18 @ 12:03), Max: 36.6 (06-12-18 @ 19:20)  HR: 68 (06-13-18 @ 12:03) (39 - 74)  BP: 197/72 (06-13-18 @ 12:03) (117/66 - 224/72)  RR: 18 (06-13-18 @ 12:03) (14 - 18)  SpO2: 98% (06-13-18 @ 12:03) (96% - 99%)  Wt(kg): --  Respiratory: clear anteriorly, decreased BS at bases  Cardiovascular: S1 S2  Gastrointestinal: soft NT ND +BS  Extremities:   tr edema  avf + bruit and thrill                                    8.7    6.80  )-----------( 235      ( 13 Jun 2018 06:28 )             28.2     06-13    143  |  104  |  23  ----------------------------<  132<H>  4.3   |  30  |  4.41<H>    Ca    7.9<L>      13 Jun 2018 06:28    TPro  7.4  /  Alb  2.8<L>  /  TBili  0.5  /  DBili  x   /  AST  16  /  ALT  17  /  AlkPhos  46  06-12      LIVER FUNCTIONS - ( 12 Jun 2018 17:26 )  Alb: 2.8 g/dL / Pro: 7.4 gm/dL / ALK PHOS: 46 U/L / ALT: 17 U/L / AST: 16 U/L / GGT: x             Assessment and Plan:  Bradycardia with hyperkalemia;  HD for tomorrow, holding Beta Blocker;   Will follow.
Subjective: comfortable      MEDICATIONS  (STANDING):  aspirin enteric coated 81 milliGRAM(s) Oral daily  atorvastatin 40 milliGRAM(s) Oral at bedtime  clopidogrel Tablet 75 milliGRAM(s) Oral daily  dextrose 5%. 1000 milliLiter(s) (50 mL/Hr) IV Continuous <Continuous>  dextrose 50% Injectable 12.5 Gram(s) IV Push once  dextrose 50% Injectable 25 Gram(s) IV Push once  dextrose 50% Injectable 25 Gram(s) IV Push once  hydrALAZINE 10 milliGRAM(s) Oral four times a day  insulin lispro (HumaLOG) corrective regimen sliding scale   SubCutaneous three times a day before meals  insulin lispro (HumaLOG) corrective regimen sliding scale   SubCutaneous at bedtime  losartan 100 milliGRAM(s) Oral daily  metoprolol succinate ER 50 milliGRAM(s) Oral daily  NIFEdipine XL 60 milliGRAM(s) Oral daily    MEDICATIONS  (PRN):  dextrose 40% Gel 15 Gram(s) Oral once PRN Blood Glucose LESS THAN 70 milliGRAM(s)/deciliter  glucagon  Injectable 1 milliGRAM(s) IntraMuscular once PRN Glucose LESS THAN 70 milligrams/deciliter          T(C): 36.6 (06-14-18 @ 05:45), Max: 37.3 (06-13-18 @ 17:42)  HR: 71 (06-14-18 @ 05:45) (65 - 71)  BP: 197/73 (06-14-18 @ 05:45) (117/66 - 224/72)  RR: 18 (06-14-18 @ 05:45) (18 - 18)  SpO2: 97% (06-14-18 @ 05:45) (97% - 99%)  Wt(kg): --        I&O's Detail    13 Jun 2018 07:01  -  14 Jun 2018 07:00  --------------------------------------------------------  IN:    Oral Fluid: 660 mL  Total IN: 660 mL    OUT:  Total OUT: 0 mL    Total NET: 660 mL               PHYSICAL EXAM:    GENERAL: no distress  EYES: EOMI, PERRLA, conjunctiva and sclera clear  NECK: Supple, no inc in JVP  CHEST/LUNG: Clear  HEART: S1S2  ABDOMEN: Soft, Nontender, Nondistended; Bowel sounds present  EXTREMITIES:  no edema  NEURO: no asterixis  ACCESS: L AVF pos thrill, bruit      LABS:  CBC Full  -  ( 13 Jun 2018 06:28 )  WBC Count : 6.80 K/uL  Hemoglobin : 8.7 g/dL  Hematocrit : 28.2 %  Platelet Count - Automated : 235 K/uL  Mean Cell Volume : 88.7 fl  Mean Cell Hemoglobin : 27.4 pg  Mean Cell Hemoglobin Concentration : 30.9 gm/dL  Auto Neutrophil # : x  Auto Lymphocyte # : x  Auto Monocyte # : x  Auto Eosinophil # : x  Auto Basophil # : x  Auto Neutrophil % : x  Auto Lymphocyte % : x  Auto Monocyte % : x  Auto Eosinophil % : x  Auto Basophil % : x    06-13    143  |  104  |  23  ----------------------------<  132<H>  4.3   |  30  |  4.41<H>    Ca    7.9<L>      13 Jun 2018 06:28    TPro  7.4  /  Alb  2.8<L>  /  TBili  0.5  /  DBili  x   /  AST  16  /  ALT  17  /  AlkPhos  46  06-12    PT/INR - ( 12 Jun 2018 17:19 )   PT: 13.4 sec;   INR: 1.22 ratio         PTT - ( 12 Jun 2018 17:19 )  PTT:30.9 sec        Impression:  1. HD dependent ESRD  2. HyperK -- resolved  3. Bradycardia due to #2. Resolved  4. HTN    Recommendations:  * Maint HD today. UF goal 1.5-2kg  * Consider Coreg for long term BP control as it has min dialytic clearance  * Renal diet, low K stressed
Patient is a 77y old  Male who presents with a chief complaint of bradycardia (12 Jun 2018 18:38)      INTERVAL HPI/OVERNIGHT EVENTS: no events     MEDICATIONS  (STANDING):  aspirin enteric coated 81 milliGRAM(s) Oral daily  atorvastatin 40 milliGRAM(s) Oral at bedtime  clopidogrel Tablet 75 milliGRAM(s) Oral daily  dextrose 5%. 1000 milliLiter(s) (50 mL/Hr) IV Continuous <Continuous>  dextrose 50% Injectable 12.5 Gram(s) IV Push once  dextrose 50% Injectable 25 Gram(s) IV Push once  dextrose 50% Injectable 25 Gram(s) IV Push once  hydrALAZINE 10 milliGRAM(s) Oral once  hydrALAZINE 10 milliGRAM(s) Oral every 8 hours  insulin lispro (HumaLOG) corrective regimen sliding scale   SubCutaneous three times a day before meals  insulin lispro (HumaLOG) corrective regimen sliding scale   SubCutaneous at bedtime  losartan 100 milliGRAM(s) Oral daily  metoprolol succinate ER 50 milliGRAM(s) Oral daily  NIFEdipine XL 60 milliGRAM(s) Oral daily    MEDICATIONS  (PRN):  dextrose 40% Gel 15 Gram(s) Oral once PRN Blood Glucose LESS THAN 70 milliGRAM(s)/deciliter  glucagon  Injectable 1 milliGRAM(s) IntraMuscular once PRN Glucose LESS THAN 70 milligrams/deciliter      Allergies    No Known Allergies    Intolerances          Vital Signs Last 24 Hrs  T(C): 36.6 (13 Jun 2018 05:32), Max: 36.6 (12 Jun 2018 19:20)  T(F): 97.8 (13 Jun 2018 05:32), Max: 97.8 (12 Jun 2018 19:20)  HR: 71 (13 Jun 2018 05:32) (39 - 74)  BP: 224/72 (13 Jun 2018 09:43) (134/66 - 224/72)  BP(mean): --  RR: 18 (13 Jun 2018 05:32) (14 - 18)  SpO2: 97% (13 Jun 2018 05:32) (96% - 99%)    PHYSICAL EXAM:  GENERAL: NAD, well-groomed, well-developed  HEAD:  Atraumatic, Normocephalic  EYES: EOMI, PERRLA, conjunctiva and sclera clear  ENMT: No tonsillar erythema, exudates, or enlargement; Moist mucous membranes, Good dentition, No lesions  NECK: Supple, No JVD, Normal thyroid  NERVOUS SYSTEM:  Alert & Oriented X3, Good concentration; Motor Strength 5/5 B/L upper and lower extremities; DTRs 2+ intact and symmetric  CHEST/LUNG: Clear to percussion bilaterally; No rales, rhonchi, wheezing, or rubs  HEART: Regular rate and rhythm; No murmurs, rubs, or gallops  ABDOMEN: Soft, Nontender, Nondistended; Bowel sounds present  EXTREMITIES:  2+ Peripheral Pulses, No clubbing, cyanosis, or edema  LYMPH: No lymphadenopathy noted  SKIN: No rashes or lesions    LABS:                        8.7    6.80  )-----------( 235      ( 13 Jun 2018 06:28 )             28.2     06-13    143  |  104  |  23  ----------------------------<  132<H>  4.3   |  30  |  4.41<H>    Ca    7.9<L>      13 Jun 2018 06:28    TPro  7.4  /  Alb  2.8<L>  /  TBili  0.5  /  DBili  x   /  AST  16  /  ALT  17  /  AlkPhos  46  06-12    PT/INR - ( 12 Jun 2018 17:19 )   PT: 13.4 sec;   INR: 1.22 ratio         PTT - ( 12 Jun 2018 17:19 )  PTT:30.9 sec    CAPILLARY BLOOD GLUCOSE      POCT Blood Glucose.: 140 mg/dL (13 Jun 2018 08:34)  POCT Blood Glucose.: 113 mg/dL (12 Jun 2018 23:40)      RADIOLOGY & ADDITIONAL TESTS:    Imaging Personally Reviewed:  [ ] YES  [ ] NO    Consultant(s) Notes Reviewed:  [ ] YES  [ ] NO    Care Discussed with Consultants/Other Providers [ ] YES  [ ] NO

## 2018-06-14 NOTE — DISCHARGE NOTE ADULT - SECONDARY DIAGNOSIS.
Bradycardia Coronary artery disease without angina pectoris, unspecified vessel or lesion type, unspecified whether native or transplanted heart ESRD (end stage renal disease)

## 2018-06-14 NOTE — DISCHARGE NOTE ADULT - CARE PLAN
Principal Discharge DX:	Hyperkalemia  Goal:	resume dialysis, resolved  Assessment and plan of treatment:	f/u pcp and kidney doctor  Secondary Diagnosis:	Bradycardia  Secondary Diagnosis:	Coronary artery disease without angina pectoris, unspecified vessel or lesion type, unspecified whether native or transplanted heart  Secondary Diagnosis:	ESRD (end stage renal disease)

## 2018-06-14 NOTE — DISCHARGE NOTE ADULT - HOSPITAL COURSE
77f with history of end stage renal disease with bradycardia        Problem/Plan - 1:  ·  Problem: Bradycardia.  Plan: telemetry   switched to carvedilol  better today, cardio eval appreciated     Problem/Plan - 2:  ·  Problem: Hyperkalemia.  Plan: s/p hd, improved, monitor bmp.      Problem/Plan - 3:  ·  Problem: ESRD (end stage renal disease).  Plan: hemodialysis as per renal.      Problem/Plan - 4:  ·  Problem: Essential hypertension.  Plan: continue home meds  uncontrolled htn, added hydralazine.      Problem/Plan - 5:  ·  Problem: Hyperlipidemia, unspecified hyperlipidemia type.  Plan: continue home meds.      Problem/Plan - 6:  Problem: CAD (coronary artery disease). Plan: continue home meds.    Attending Attestation:   45 minutes spent on total dc encounter; more than 50% of the visit was spent counseling and/or coordinating care by the attending physician.

## 2018-06-14 NOTE — DISCHARGE NOTE ADULT - MEDICATION SUMMARY - MEDICATIONS TO TAKE
I will START or STAY ON the medications listed below when I get home from the hospital:    aspirin 81 mg oral delayed release tablet  -- 1 tab(s) by mouth once a day  -- Indication: For preventive    losartan 100 mg oral tablet  -- 1 tab(s) by mouth once a day  -- Indication: For Essential hypertension    glimepiride 2 mg oral tablet  -- 1 tab(s) by mouth once a day  -- Indication: For dm    atorvastatin 40 mg oral tablet  -- 1 tab(s) by mouth once a day (at bedtime)  -- Indication: For preventive    clopidogrel 75 mg oral tablet  -- 1 tab(s) by mouth once a day  -- Indication: For CAD (coronary artery disease)    carvedilol 12.5 mg oral tablet  -- 1 tab(s) by mouth every 12 hours  -- Indication: For Essential hypertension    NIFEdipine 60 mg oral tablet, extended release  -- 1 tab(s) by mouth once a day at 6:00AM  -- Indication: For Essential hypertension    hydrALAZINE 10 mg oral tablet  -- 1 tab(s) by mouth 4 times a day  -- Indication: For Essential hypertension    B-12 1000 mcg oral tablet, extended release  -- 1 tab(s) by mouth once a day   -- Check with your doctor before becoming pregnant.    -- Indication: For preventive

## 2018-06-14 NOTE — DISCHARGE NOTE ADULT - MEDICATION SUMMARY - MEDICATIONS TO STOP TAKING
I will STOP taking the medications listed below when I get home from the hospital:    metoprolol succinate 100 mg oral tablet, extended release  -- 1 tab(s) by mouth once a day

## 2018-06-14 NOTE — DISCHARGE NOTE ADULT - PATIENT PORTAL LINK FT
You can access the [facility], offered by [facility], by registering with the following website: [email or web address]

## 2018-06-22 DIAGNOSIS — E11.22 TYPE 2 DIABETES MELLITUS WITH DIABETIC CHRONIC KIDNEY DISEASE: ICD-10-CM

## 2018-06-22 DIAGNOSIS — Z79.02 LONG TERM (CURRENT) USE OF ANTITHROMBOTICS/ANTIPLATELETS: ICD-10-CM

## 2018-06-22 DIAGNOSIS — N18.6 END STAGE RENAL DISEASE: ICD-10-CM

## 2018-06-22 DIAGNOSIS — I44.0 ATRIOVENTRICULAR BLOCK, FIRST DEGREE: ICD-10-CM

## 2018-06-22 DIAGNOSIS — Z79.84 LONG TERM (CURRENT) USE OF ORAL HYPOGLYCEMIC DRUGS: ICD-10-CM

## 2018-06-22 DIAGNOSIS — E78.5 HYPERLIPIDEMIA, UNSPECIFIED: ICD-10-CM

## 2018-06-22 DIAGNOSIS — E11.51 TYPE 2 DIABETES MELLITUS WITH DIABETIC PERIPHERAL ANGIOPATHY WITHOUT GANGRENE: ICD-10-CM

## 2018-06-22 DIAGNOSIS — E87.5 HYPERKALEMIA: ICD-10-CM

## 2018-06-22 DIAGNOSIS — F03.90 UNSPECIFIED DEMENTIA WITHOUT BEHAVIORAL DISTURBANCE: ICD-10-CM

## 2018-06-22 DIAGNOSIS — R00.1 BRADYCARDIA, UNSPECIFIED: ICD-10-CM

## 2018-06-22 DIAGNOSIS — Z98.49 CATARACT EXTRACTION STATUS, UNSPECIFIED EYE: ICD-10-CM

## 2018-06-22 DIAGNOSIS — I25.10 ATHEROSCLEROTIC HEART DISEASE OF NATIVE CORONARY ARTERY WITHOUT ANGINA PECTORIS: ICD-10-CM

## 2018-06-22 DIAGNOSIS — Z95.5 PRESENCE OF CORONARY ANGIOPLASTY IMPLANT AND GRAFT: ICD-10-CM

## 2018-06-22 DIAGNOSIS — I12.0 HYPERTENSIVE CHRONIC KIDNEY DISEASE WITH STAGE 5 CHRONIC KIDNEY DISEASE OR END STAGE RENAL DISEASE: ICD-10-CM

## 2018-06-22 DIAGNOSIS — Z99.2 DEPENDENCE ON RENAL DIALYSIS: ICD-10-CM

## 2018-06-22 DIAGNOSIS — Z79.82 LONG TERM (CURRENT) USE OF ASPIRIN: ICD-10-CM

## 2018-06-22 DIAGNOSIS — Z90.49 ACQUIRED ABSENCE OF OTHER SPECIFIED PARTS OF DIGESTIVE TRACT: ICD-10-CM

## 2018-07-07 ENCOUNTER — INPATIENT (INPATIENT)
Facility: HOSPITAL | Age: 78
LOS: 0 days | Discharge: HOME HEALTH | End: 2018-07-08
Attending: INTERNAL MEDICINE | Admitting: INTERNAL MEDICINE
Payer: MEDICARE

## 2018-07-07 VITALS
TEMPERATURE: 98 F | DIASTOLIC BLOOD PRESSURE: 83 MMHG | OXYGEN SATURATION: 99 % | RESPIRATION RATE: 20 BRPM | HEART RATE: 83 BPM | SYSTOLIC BLOOD PRESSURE: 219 MMHG

## 2018-07-07 DIAGNOSIS — E11.9 TYPE 2 DIABETES MELLITUS WITHOUT COMPLICATIONS: ICD-10-CM

## 2018-07-07 DIAGNOSIS — I10 ESSENTIAL (PRIMARY) HYPERTENSION: ICD-10-CM

## 2018-07-07 DIAGNOSIS — R06.02 SHORTNESS OF BREATH: ICD-10-CM

## 2018-07-07 DIAGNOSIS — I25.10 ATHEROSCLEROTIC HEART DISEASE OF NATIVE CORONARY ARTERY WITHOUT ANGINA PECTORIS: ICD-10-CM

## 2018-07-07 DIAGNOSIS — N18.6 END STAGE RENAL DISEASE: ICD-10-CM

## 2018-07-07 DIAGNOSIS — E78.5 HYPERLIPIDEMIA, UNSPECIFIED: ICD-10-CM

## 2018-07-07 DIAGNOSIS — Z98.89 OTHER SPECIFIED POSTPROCEDURAL STATES: Chronic | ICD-10-CM

## 2018-07-07 LAB
ALBUMIN SERPL ELPH-MCNC: 3.5 G/DL — SIGNIFICANT CHANGE UP (ref 3.3–5)
ALP SERPL-CCNC: 44 U/L — SIGNIFICANT CHANGE UP (ref 40–120)
ALT FLD-CCNC: 12 U/L — SIGNIFICANT CHANGE UP (ref 4–41)
AST SERPL-CCNC: 15 U/L — SIGNIFICANT CHANGE UP (ref 4–40)
BASE EXCESS BLDV CALC-SCNC: 3.2 MMOL/L — SIGNIFICANT CHANGE UP
BASOPHILS # BLD AUTO: 0.07 K/UL — SIGNIFICANT CHANGE UP (ref 0–0.2)
BASOPHILS NFR BLD AUTO: 0.8 % — SIGNIFICANT CHANGE UP (ref 0–2)
BILIRUB SERPL-MCNC: 0.5 MG/DL — SIGNIFICANT CHANGE UP (ref 0.2–1.2)
BLOOD GAS VENOUS - CREATININE: 6.89 MG/DL — HIGH (ref 0.5–1.3)
BUN SERPL-MCNC: 54 MG/DL — HIGH (ref 7–23)
CALCIUM SERPL-MCNC: 7.7 MG/DL — LOW (ref 8.4–10.5)
CHLORIDE BLDV-SCNC: 101 MMOL/L — SIGNIFICANT CHANGE UP (ref 96–108)
CHLORIDE SERPL-SCNC: 97 MMOL/L — LOW (ref 98–107)
CO2 SERPL-SCNC: 25 MMOL/L — SIGNIFICANT CHANGE UP (ref 22–31)
CREAT SERPL-MCNC: 6.49 MG/DL — HIGH (ref 0.5–1.3)
EOSINOPHIL # BLD AUTO: 0.28 K/UL — SIGNIFICANT CHANGE UP (ref 0–0.5)
EOSINOPHIL NFR BLD AUTO: 3 % — SIGNIFICANT CHANGE UP (ref 0–6)
GAS PNL BLDV: 136 MMOL/L — SIGNIFICANT CHANGE UP (ref 136–146)
GLUCOSE BLDV-MCNC: 124 — HIGH (ref 70–99)
GLUCOSE SERPL-MCNC: 124 MG/DL — HIGH (ref 70–99)
HCO3 BLDV-SCNC: 27 MMOL/L — SIGNIFICANT CHANGE UP (ref 20–27)
HCT VFR BLD CALC: 26.8 % — LOW (ref 39–50)
HCT VFR BLDV CALC: 28.5 % — LOW (ref 39–51)
HGB BLD-MCNC: 8.6 G/DL — LOW (ref 13–17)
HGB BLDV-MCNC: 9.2 G/DL — LOW (ref 13–17)
IMM GRANULOCYTES # BLD AUTO: 0.02 # — SIGNIFICANT CHANGE UP
IMM GRANULOCYTES NFR BLD AUTO: 0.2 % — SIGNIFICANT CHANGE UP (ref 0–1.5)
LACTATE BLDV-MCNC: 1.4 MMOL/L — SIGNIFICANT CHANGE UP (ref 0.5–2)
LYMPHOCYTES # BLD AUTO: 1.77 K/UL — SIGNIFICANT CHANGE UP (ref 1–3.3)
LYMPHOCYTES # BLD AUTO: 19.3 % — SIGNIFICANT CHANGE UP (ref 13–44)
MAGNESIUM SERPL-MCNC: 2.2 MG/DL — SIGNIFICANT CHANGE UP (ref 1.6–2.6)
MCHC RBC-ENTMCNC: 27.7 PG — SIGNIFICANT CHANGE UP (ref 27–34)
MCHC RBC-ENTMCNC: 32.1 % — SIGNIFICANT CHANGE UP (ref 32–36)
MCV RBC AUTO: 86.5 FL — SIGNIFICANT CHANGE UP (ref 80–100)
MONOCYTES # BLD AUTO: 0.8 K/UL — SIGNIFICANT CHANGE UP (ref 0–0.9)
MONOCYTES NFR BLD AUTO: 8.7 % — SIGNIFICANT CHANGE UP (ref 2–14)
NEUTROPHILS # BLD AUTO: 6.25 K/UL — SIGNIFICANT CHANGE UP (ref 1.8–7.4)
NEUTROPHILS NFR BLD AUTO: 68 % — SIGNIFICANT CHANGE UP (ref 43–77)
NRBC # FLD: 0 — SIGNIFICANT CHANGE UP
PCO2 BLDV: 44 MMHG — SIGNIFICANT CHANGE UP (ref 41–51)
PH BLDV: 7.41 PH — SIGNIFICANT CHANGE UP (ref 7.32–7.43)
PHOSPHATE SERPL-MCNC: 3.5 MG/DL — SIGNIFICANT CHANGE UP (ref 2.5–4.5)
PLATELET # BLD AUTO: 199 K/UL — SIGNIFICANT CHANGE UP (ref 150–400)
PMV BLD: 9.6 FL — SIGNIFICANT CHANGE UP (ref 7–13)
PO2 BLDV: 36 MMHG — SIGNIFICANT CHANGE UP (ref 35–40)
POTASSIUM BLDV-SCNC: 5.4 MMOL/L — HIGH (ref 3.4–4.5)
POTASSIUM SERPL-MCNC: 5.8 MMOL/L — HIGH (ref 3.5–5.3)
POTASSIUM SERPL-SCNC: 5.8 MMOL/L — HIGH (ref 3.5–5.3)
PROT SERPL-MCNC: 7 G/DL — SIGNIFICANT CHANGE UP (ref 6–8.3)
RBC # BLD: 3.1 M/UL — LOW (ref 4.2–5.8)
RBC # FLD: 15 % — HIGH (ref 10.3–14.5)
SAO2 % BLDV: 59.4 % — LOW (ref 60–85)
SODIUM SERPL-SCNC: 138 MMOL/L — SIGNIFICANT CHANGE UP (ref 135–145)
TROPONIN T, HIGH SENSITIVITY: 200 NG/L — CRITICAL HIGH (ref ?–14)
TROPONIN T, HIGH SENSITIVITY: 206 NG/L — CRITICAL HIGH (ref ?–14)
WBC # BLD: 9.19 K/UL — SIGNIFICANT CHANGE UP (ref 3.8–10.5)
WBC # FLD AUTO: 9.19 K/UL — SIGNIFICANT CHANGE UP (ref 3.8–10.5)

## 2018-07-07 PROCEDURE — 71045 X-RAY EXAM CHEST 1 VIEW: CPT | Mod: 26

## 2018-07-07 RX ORDER — DEXTROSE 50 % IN WATER 50 %
50 SYRINGE (ML) INTRAVENOUS ONCE
Qty: 0 | Refills: 0 | Status: COMPLETED | OUTPATIENT
Start: 2018-07-07 | End: 2018-07-07

## 2018-07-07 RX ORDER — CARVEDILOL PHOSPHATE 80 MG/1
12.5 CAPSULE, EXTENDED RELEASE ORAL DAILY
Qty: 0 | Refills: 0 | Status: DISCONTINUED | OUTPATIENT
Start: 2018-07-07 | End: 2018-07-08

## 2018-07-07 RX ORDER — INSULIN LISPRO 100/ML
VIAL (ML) SUBCUTANEOUS
Qty: 0 | Refills: 0 | Status: DISCONTINUED | OUTPATIENT
Start: 2018-07-07 | End: 2018-07-08

## 2018-07-07 RX ORDER — INSULIN LISPRO 100/ML
VIAL (ML) SUBCUTANEOUS AT BEDTIME
Qty: 0 | Refills: 0 | Status: DISCONTINUED | OUTPATIENT
Start: 2018-07-07 | End: 2018-07-08

## 2018-07-07 RX ORDER — NIFEDIPINE 30 MG
60 TABLET, EXTENDED RELEASE 24 HR ORAL ONCE
Qty: 0 | Refills: 0 | Status: COMPLETED | OUTPATIENT
Start: 2018-07-07 | End: 2018-07-07

## 2018-07-07 RX ORDER — LOSARTAN POTASSIUM 100 MG/1
100 TABLET, FILM COATED ORAL DAILY
Qty: 0 | Refills: 0 | Status: DISCONTINUED | OUTPATIENT
Start: 2018-07-07 | End: 2018-07-08

## 2018-07-07 RX ORDER — LABETALOL HCL 100 MG
20 TABLET ORAL ONCE
Qty: 0 | Refills: 0 | Status: COMPLETED | OUTPATIENT
Start: 2018-07-07 | End: 2018-07-07

## 2018-07-07 RX ORDER — DEXTROSE 50 % IN WATER 50 %
15 SYRINGE (ML) INTRAVENOUS ONCE
Qty: 0 | Refills: 0 | Status: DISCONTINUED | OUTPATIENT
Start: 2018-07-07 | End: 2018-07-08

## 2018-07-07 RX ORDER — NIFEDIPINE 30 MG
60 TABLET, EXTENDED RELEASE 24 HR ORAL DAILY
Qty: 0 | Refills: 0 | Status: DISCONTINUED | OUTPATIENT
Start: 2018-07-07 | End: 2018-07-08

## 2018-07-07 RX ORDER — SODIUM CHLORIDE 9 MG/ML
1000 INJECTION, SOLUTION INTRAVENOUS
Qty: 0 | Refills: 0 | Status: DISCONTINUED | OUTPATIENT
Start: 2018-07-07 | End: 2018-07-08

## 2018-07-07 RX ORDER — INSULIN HUMAN 100 [IU]/ML
8 INJECTION, SOLUTION SUBCUTANEOUS ONCE
Qty: 0 | Refills: 0 | Status: COMPLETED | OUTPATIENT
Start: 2018-07-07 | End: 2018-07-07

## 2018-07-07 RX ORDER — ALBUTEROL 90 UG/1
2.5 AEROSOL, METERED ORAL ONCE
Qty: 0 | Refills: 0 | Status: COMPLETED | OUTPATIENT
Start: 2018-07-07 | End: 2018-07-07

## 2018-07-07 RX ORDER — DEXTROSE 50 % IN WATER 50 %
25 SYRINGE (ML) INTRAVENOUS ONCE
Qty: 0 | Refills: 0 | Status: DISCONTINUED | OUTPATIENT
Start: 2018-07-07 | End: 2018-07-08

## 2018-07-07 RX ORDER — HYDRALAZINE HCL 50 MG
10 TABLET ORAL ONCE
Qty: 0 | Refills: 0 | Status: COMPLETED | OUTPATIENT
Start: 2018-07-07 | End: 2018-07-07

## 2018-07-07 RX ORDER — HYDRALAZINE HCL 50 MG
10 TABLET ORAL
Qty: 0 | Refills: 0 | Status: DISCONTINUED | OUTPATIENT
Start: 2018-07-07 | End: 2018-07-08

## 2018-07-07 RX ORDER — DEXTROSE 50 % IN WATER 50 %
12.5 SYRINGE (ML) INTRAVENOUS ONCE
Qty: 0 | Refills: 0 | Status: DISCONTINUED | OUTPATIENT
Start: 2018-07-07 | End: 2018-07-08

## 2018-07-07 RX ORDER — LABETALOL HCL 100 MG
10 TABLET ORAL ONCE
Qty: 0 | Refills: 0 | Status: COMPLETED | OUTPATIENT
Start: 2018-07-07 | End: 2018-07-07

## 2018-07-07 RX ORDER — CARVEDILOL PHOSPHATE 80 MG/1
1 CAPSULE, EXTENDED RELEASE ORAL
Qty: 0 | Refills: 0 | COMMUNITY

## 2018-07-07 RX ORDER — ATORVASTATIN CALCIUM 80 MG/1
40 TABLET, FILM COATED ORAL AT BEDTIME
Qty: 0 | Refills: 0 | Status: DISCONTINUED | OUTPATIENT
Start: 2018-07-07 | End: 2018-07-08

## 2018-07-07 RX ORDER — CALCIUM GLUCONATE 100 MG/ML
1 VIAL (ML) INTRAVENOUS ONCE
Qty: 0 | Refills: 0 | Status: COMPLETED | OUTPATIENT
Start: 2018-07-07 | End: 2018-07-07

## 2018-07-07 RX ORDER — INSULIN HUMAN 100 [IU]/ML
8 INJECTION, SOLUTION SUBCUTANEOUS ONCE
Qty: 0 | Refills: 0 | Status: DISCONTINUED | OUTPATIENT
Start: 2018-07-07 | End: 2018-07-07

## 2018-07-07 RX ORDER — SODIUM CHLORIDE 9 MG/ML
3 INJECTION INTRAMUSCULAR; INTRAVENOUS; SUBCUTANEOUS EVERY 8 HOURS
Qty: 0 | Refills: 0 | Status: DISCONTINUED | OUTPATIENT
Start: 2018-07-07 | End: 2018-07-08

## 2018-07-07 RX ORDER — ASPIRIN/CALCIUM CARB/MAGNESIUM 324 MG
81 TABLET ORAL DAILY
Qty: 0 | Refills: 0 | Status: DISCONTINUED | OUTPATIENT
Start: 2018-07-07 | End: 2018-07-08

## 2018-07-07 RX ORDER — LOSARTAN POTASSIUM 100 MG/1
100 TABLET, FILM COATED ORAL ONCE
Qty: 0 | Refills: 0 | Status: COMPLETED | OUTPATIENT
Start: 2018-07-07 | End: 2018-07-07

## 2018-07-07 RX ORDER — GLUCAGON INJECTION, SOLUTION 0.5 MG/.1ML
1 INJECTION, SOLUTION SUBCUTANEOUS ONCE
Qty: 0 | Refills: 0 | Status: DISCONTINUED | OUTPATIENT
Start: 2018-07-07 | End: 2018-07-08

## 2018-07-07 RX ORDER — HEPARIN SODIUM 5000 [USP'U]/ML
5000 INJECTION INTRAVENOUS; SUBCUTANEOUS EVERY 8 HOURS
Qty: 0 | Refills: 0 | Status: DISCONTINUED | OUTPATIENT
Start: 2018-07-07 | End: 2018-07-08

## 2018-07-07 RX ADMIN — Medication 20 MILLIGRAM(S): at 08:00

## 2018-07-07 RX ADMIN — HEPARIN SODIUM 5000 UNIT(S): 5000 INJECTION INTRAVENOUS; SUBCUTANEOUS at 21:26

## 2018-07-07 RX ADMIN — Medication 60 MILLIGRAM(S): at 10:33

## 2018-07-07 RX ADMIN — Medication 10 MILLIGRAM(S): at 14:19

## 2018-07-07 RX ADMIN — Medication 10 MILLIGRAM(S): at 09:32

## 2018-07-07 RX ADMIN — ATORVASTATIN CALCIUM 40 MILLIGRAM(S): 80 TABLET, FILM COATED ORAL at 21:26

## 2018-07-07 RX ADMIN — LOSARTAN POTASSIUM 100 MILLIGRAM(S): 100 TABLET, FILM COATED ORAL at 10:34

## 2018-07-07 RX ADMIN — Medication 50 MILLILITER(S): at 04:52

## 2018-07-07 RX ADMIN — CARVEDILOL PHOSPHATE 12.5 MILLIGRAM(S): 80 CAPSULE, EXTENDED RELEASE ORAL at 12:21

## 2018-07-07 RX ADMIN — Medication 81 MILLIGRAM(S): at 12:19

## 2018-07-07 RX ADMIN — Medication 200 GRAM(S): at 04:52

## 2018-07-07 RX ADMIN — HEPARIN SODIUM 5000 UNIT(S): 5000 INJECTION INTRAVENOUS; SUBCUTANEOUS at 12:21

## 2018-07-07 RX ADMIN — ALBUTEROL 2.5 MILLIGRAM(S): 90 AEROSOL, METERED ORAL at 03:43

## 2018-07-07 RX ADMIN — INSULIN HUMAN 8 UNIT(S): 100 INJECTION, SOLUTION SUBCUTANEOUS at 04:52

## 2018-07-07 RX ADMIN — Medication 10 MILLIGRAM(S): at 20:18

## 2018-07-07 NOTE — ED SUB INTERN NOTE - MEDICAL DECISION MAKING DETAILS
76 yo male with PMHx of HTN, DM, CKD on dialysis, CAD with stents who presents with acute onset of dyspnea. Dyspnea may be due to underlying CKD and altered dialysis schedule. EKG suggestive of hyperkalemia. Will give calcium gluconate, albuterol, dextrose/insulin, CBC, CMP, VBG. Alternate differentials include PNA given subjective fevers, PE given acute onset.

## 2018-07-07 NOTE — ED CLERICAL - NS ED CLERK NOTE PRE-ARRIVAL INFORMATION; ADDITIONAL PRE-ARRIVAL INFORMATION

## 2018-07-07 NOTE — H&P ADULT - HISTORY OF PRESENT ILLNESS
This is a 76 y/o M adm with PMHx of ESRD on HD, HTN, CAD s/p 1 stent, DM, Loop Recorder placed a year ago presents with c/o dyspnea. As per pts wife, pt was sleeping and around 2am pt woke up from his sleep and c/o difficulty breathing. Pts wife called 911 and he was brought to the ED. Pt had no This is a 78 y/o M adm with PMHx of ESRD on HD, HTN, CAD s/p 1 stent, DM, Loop Recorder placed a year ago presents with c/o dyspnea. As per pts wife, pt was sleeping and around 2am pt woke up from his sleep and c/o difficulty breathing. Pts wife called 911 and he was brought to the ED. Pt had no chest pain, dizziness, palpitations, N/V/D, Abdominal pain, fever chills. As per wife, pt coughs intermittently. Pts last dialysis was on Wednesday due to a schedule change and has not been dialyzed since then. This is a 78 y/o M adm with PMHx of ESRD on HD T/T/S, HTN, CAD s/p 1 stent, DM, Loop Recorder placed a year ago presents with c/o dyspnea. As per pts wife, pt was sleeping and around 2am pt woke up from his sleep and c/o difficulty breathing. Pts wife called 911 and he was brought to the ED. Pt had no chest pain, dizziness, palpitations, N/V/D, Abdominal pain, fever chills. As per wife, pt coughs intermittently. Pts last dialysis was on Wednesday due to a schedule change and has not been dialyzed since then.

## 2018-07-07 NOTE — CONSULT NOTE ADULT - PROBLEM SELECTOR RECOMMENDATION 9
HD today with UF as tolerated to improve BP/volume status.  Continue with maintenance hemodialysis treatments thereafter, though will consider extra treatment tomorrow or day after. Monitor BMP.

## 2018-07-07 NOTE — ED PROVIDER NOTE - OBJECTIVE STATEMENT
76 yo male c h//o CKD on ESRD, HTN, CAD with stents, DM who presents with dyspnea x 1 hr. Onset of dyspnea was acute, woke patient up from sleep. No associated CP. Endorses dry cough x 1 week with subjective fevers. Pt has also complained of intermittent palpitations in past week. Normal dialysis schedule in T/Th/Sat, but this week has had altered schedule. Last dialysis was this Wednesday d/t doctor's appt. Pt dependent on family for all ADLs. Denies any leg swelling. Pt has HTN and baseline SBPs are in 160-170s. No recent medication changes.       Part of hx provided by wife who spoke for pt, but during secondary eval pt able to answer questions on his own.

## 2018-07-07 NOTE — H&P ADULT - ASSESSMENT
This is a 78 y/o M adm with PMHx of ESRD on HD, HTN, CAD s/p 1 stent, DM, Loop Recorder placed a year ago presents with c/o dyspnea. As per pts wife, pt was sleeping and around 2am pt woke up from his sleep and c/o difficulty breathing. Pts wife called 911 and he was brought to the ED. Pt had no chest pain, dizziness, palpitations, N/V/D, Abdominal pain, fever chills. As per wife, pt coughs intermittently. Pts last dialysis was on Wednesday due to a schedule change and has not been dialyzed since then.

## 2018-07-07 NOTE — ED ADULT TRIAGE NOTE - CHIEF COMPLAINT QUOTE
Pt with a pmhx of htn, dm, CKD on dialysis presents with c/o SOB. Pt notes that his HD schedule this week was off his routinely scheduled days.

## 2018-07-07 NOTE — CONSULT NOTE ADULT - ASSESSMENT
Problem/Plan - 1:  ·  Problem: Shortness of breath.  Plan: telemonitor   Continue current medications.   check echo  cards fu  Ischemia lorenzana as per cards    Problem/Plan - 2:  ·  Problem: ESRD (end stage renal disease).  Plan: monitor cr  renal fu    Problem/Plan - 3:  ·  Problem: CAD (coronary artery disease).  Plan: Continue ASA, Lipitor.     Problem/Plan - 4:  ·  Problem: HLD (hyperlipidemia).  Plan: Continue Lipitor.     Problem/Plan - 5:  ·  Problem: DM (diabetes mellitus).  Plan: Continue Insulin Sliding Scale.   Monitor Fingersticks.     Problem/Plan - 6:  Problem: HTN (hypertension). Plan: Continue Nifedipine, Coreg, Hydralazine, Losartan.   Monitor BP.
77 year-old man with ESRD on HD with hypertensive emergency secondary to volume overload.  B pleural effusions.

## 2018-07-07 NOTE — CONSULT NOTE ADULT - PROBLEM SELECTOR RECOMMENDATION 2
Maximal UF with HD today.  Consider extra HD Rx as well as above.  Continue with home anti-hypertensive medications for now, but will consider an increase in medication dosing if necessary. Monitor BP.

## 2018-07-07 NOTE — ED PROVIDER NOTE - ENMT, MLM
Airway patent, Nasal mucosa clear. Mouth with normal mucosa. Throat has no vesicles, no oropharyngeal exudates and uvula is midline. 81

## 2018-07-07 NOTE — CONSULT NOTE ADULT - ATTENDING COMMENTS
[facility] NEPHROLOGY  Ihsan Saba M.D.  Eduardo Romano D.O.  Loni Ribeiro M.D.  Maris Ramirez, MSN, ANP-C    Telephone: [phone]  Facsimile: [phone]    [address] [place] [address]

## 2018-07-07 NOTE — H&P ADULT - PMH
CAD (coronary artery disease)  s/p 1 stent  Dementia    DM (diabetes mellitus)    ESRD (end stage renal disease)    HLD (hyperlipidemia)    HTN (hypertension)    PVD (peripheral vascular disease)    Thrombocytopenia CAD (coronary artery disease)  s/p 1 stent  Dementia    DM (diabetes mellitus)    ESRD (end stage renal disease)    Fall  s/p L.arm fracture  HLD (hyperlipidemia)    HTN (hypertension)    PVD (peripheral vascular disease)    Thrombocytopenia

## 2018-07-07 NOTE — ED ADULT NURSE REASSESSMENT NOTE - NS ED NURSE REASSESS COMMENT FT1
Break Coverage RN - Ultrasound guided 20 gauge IV inserted in right upper arm by MD Luo. Medication given as per order. MD at bedside for evaluation. Will continue to monitor. Break Coverage RN - Ultrasound guided 20 gauge IV inserted in right upper arm by MD Luo. Medication given as per order. MD at bedside for evaluation and is aware of pt. vital signs. Will continue to monitor.

## 2018-07-07 NOTE — ED ADULT NURSE NOTE - OBJECTIVE STATEMENT
pt. received into room # 27 with c/o sob upon waking this morning.  As per wife Pt. is due for dialysis today; last dialysis on Wednesday. usual days Tu, Th & Sat. noted with L upper arm fistula + trill and bruit.  noted with elevated bp on monitor 262/63. denies any chest pain or sob at this time.  attempted to obtain SL x 2 unsuccessful. Pt. refusing to be stuck again.  MD aware.  Skin dry and intact; no breakdown noted. wife and sister at bedside. will continue to monitor.

## 2018-07-07 NOTE — CONSULT NOTE ADULT - SUBJECTIVE AND OBJECTIVE BOX
Pt interviewed and examined. Full consult to follow. [facility] NEPHROLOGY- CONSULTATION NOTE    Patient is a 77y Male with ESRD on HD TTS, Dr. Romano, [facility], who presented to the hospital with SOB overnight.  He was last dialyzed three days ago (he was off schedule due to scheduling change) and was for dialysis today.  He has CAD for which he follows with Dr. Flaherty.  He was found to have severe HTN.  He feels a bit better with O2 now.    PAST MEDICAL & SURGICAL HISTORY:  Fall: s/p L.arm fracture  Dementia  Thrombocytopenia  PVD (peripheral vascular disease)  CAD (coronary artery disease): s/p 1 stent  HLD (hyperlipidemia)  HTN (hypertension)  DM (diabetes mellitus)  ESRD (end stage renal disease)  H/O foot surgery: sec to ankle fracture  S/P cataract extraction  S/P coronary artery stent placement  S/P cholecystectomy  AVF (arteriovenous fistula)    No Known Allergies    Home Medications Reviewed  Hospital Medications:   MEDICATIONS  (STANDING):  aspirin enteric coated 81 milliGRAM(s) Oral daily  atorvastatin 40 milliGRAM(s) Oral at bedtime  carvedilol 12.5 milliGRAM(s) Oral daily  dextrose 5%. 1000 milliLiter(s) (50 mL/Hr) IV Continuous <Continuous>  dextrose 50% Injectable 12.5 Gram(s) IV Push once  dextrose 50% Injectable 25 Gram(s) IV Push once  dextrose 50% Injectable 25 Gram(s) IV Push once  heparin  Injectable 5000 Unit(s) SubCutaneous every 8 hours  hydrALAZINE 10 milliGRAM(s) Oral two times a day  insulin lispro (HumaLOG) corrective regimen sliding scale   SubCutaneous three times a day before meals  insulin lispro (HumaLOG) corrective regimen sliding scale   SubCutaneous at bedtime  losartan 100 milliGRAM(s) Oral daily  NIFEdipine XL 60 milliGRAM(s) Oral daily    SOCIAL HISTORY:  Denies ETOh,Smoking,   FAMILY HISTORY:  No pertinent family history in first degree relatives    REVIEW OF SYSTEMS:  CONSTITUTIONAL: + generalized weakness. No fevers or chills  EYES/ENT: No visual changes;  No vertigo or throat pain   NECK: No pain or stiffness  RESPIRATORY: No cough, wheezing, hemoptysis; + shortness of breath  CARDIOVASCULAR: No chest pain or palpitations.  GASTROINTESTINAL: No abdominal or epigastric pain. No nausea, vomiting, or hematemesis; No diarrhea or constipation. No melena or hematochezia.  GENITOURINARY: No dysuria, frequency, foamy urine, urinary urgency, incontinence or hematuria  NEUROLOGICAL: No numbness or weakness  SKIN: No itching, burning, rashes, or lesions   VASCULAR: No bilateral lower extremity edema.   All other review of systems is negative unless indicated above.    VITALS:  T(F): 97.9 (07-07-18 @ 20:14), Max: 99 (07-07-18 @ 19:30)  HR: 96 (07-07-18 @ 20:14)  BP: 155/75 (07-07-18 @ 20:14)  (high 234/68)  RR: 19 (07-07-18 @ 20:14)  SpO2: 100% (07-07-18 @ 20:14)  Wt(kg): --    07-07 @ 07:01  -  07-08 @ 00:12  --------------------------------------------------------  IN: 400 mL / OUT: 1900 mL / NET: -1500 mL      Height (cm): 162.56 (07-07 @ 11:29)  Weight (kg): 56.7 (07-07 @ 11:29)  BMI (kg/m2): 21.5 (07-07 @ 11:29)  BSA (m2): 1.6 (07-07 @ 11:29)  PHYSICAL EXAM:  Constitutional: NAD  HEENT: anicteric sclera, oropharynx clear, MMM  Neck: No JVD  Respiratory: CTAB, no wheezes, rales or rhonchi  Cardiovascular: S1, S2, RRR  Gastrointestinal: BS+, soft, NT/ND  Extremities: No cyanosis or clubbing. No peripheral edema  Neurological: A/O x 3, no focal deficits  Psychiatric: Normal mood, normal affect  : No CVA tenderness. No cordoba.   Skin: No rashes  Vascular Access: Left AVF benign, +thrill +bruit    LABS:  07-07    138  |  97<L>  |  54<H>  ----------------------------<  124<H>  5.8<H>   |  25  |  6.49<H>    Ca    7.7<L>      07 Jul 2018 04:20  Phos  3.5     07-07  Mg     2.2     07-07    TPro  7.0  /  Alb  3.5  /  TBili  0.5  /  DBili      /  AST  15  /  ALT  12  /  AlkPhos  44  07-07    Creatinine Trend: 6.49 <--                        8.6    9.19  )-----------( 199      ( 07 Jul 2018 04:20 )             26.8     Urine Studies:      RADIOLOGY & ADDITIONAL STUDIES:  < from: Xray Chest 1 View- PORTABLE-Routine (07.07.18 @ 04:23) >    EXAM:  XR CHEST PORTABLE ROUTINE 1V        PROCEDURE DATE:  Jul 7 2018         INTERPRETATION:  CLINICAL INFORMATION: Dyspnea, end-stage renal disease.    EXAM: AP portable radiograph of the chest.    COMPARISON: Chest radiograph 6/12/2018.    FINDINGS:  Bilateral pleural effusions with adjacent passive atelectasis.  No pneumothorax.  Cardiac size is not accurately evaluated in this projection.  Loop recorder projects over the lower left hemithorax.  Left axillary vascular stents.    IMPRESSION:  Bilateral pleural effusions.                DHEERAJ SPENCE M.D., RADIOLOGY RESIDENT  This document has been electronically signed.  CLEMENTE CABRALES M.D., ATTENDING RADIOLOGIST  This document has been electronically signed. Jul 7 2018  5:48AM                  < end of copied text >

## 2018-07-07 NOTE — H&P ADULT - PROBLEM SELECTOR PLAN 1
Will contact Nephrologist for HD today.   Continue current medications.   Consider 2D Echo.   Monitor pt on Telemetry  Will discuss plan with Dr. Flaherty.

## 2018-07-07 NOTE — ED ADULT NURSE REASSESSMENT NOTE - NS ED NURSE REASSESS COMMENT FT1
patient remains hypertensive, medicated as ordered, O2 sat 100% RA, bed assignment received, report given, will continue to monitor.

## 2018-07-07 NOTE — H&P ADULT - ATTENDING COMMENTS
Patient seen and examined.  Agree with above pa note.  Patient is a 77 year old man with history of ESRD on HD T/T/S, HTN, CAD s/p 1 stent, DM, Loop Recorder placed a year ago admitted with dyspnea.    Patient denies any chest pain, palpitations, cough, syncope, edema, exertional symptoms, nausea, abdominal pain, fever, chills,  or rash.      kelsie negative  vitals stable  sbp 200  nad aao x3 nc/at neck supple no jvp   cv s1s2 rrr lungs clear b/l  abd soft, nt  ext no edema    A/P    77 year old man with history of ESRD on HD T/T/S, HTN, CAD s/p 1 stent, DM, Loop Recorder placed a year ago admitted with dyspnea.      1. Hypertension, uncontrolled  volume removal with hd  cont bp meds  iv doses as needed    2. CAD, pci  cont asa  stable without cp    3. ESRD, hd today     dvt ppx

## 2018-07-07 NOTE — H&P ADULT - PROBLEM SELECTOR PLAN 2
Will contact Nephrologist for HD today. Will contact Nephrologist for HD today.  Hyperkalemia treated in ED.

## 2018-07-07 NOTE — ED PROVIDER NOTE - PROGRESS NOTE DETAILS
d/w Dr. Saba who requested admit to [facility] d/w Dr. Saba who requested admit to [facility]  IV initially delayed d/t difficult access.  US PIV placed by me. Accepted by SUSHMA Flaherty

## 2018-07-07 NOTE — CONSULT NOTE ADULT - SUBJECTIVE AND OBJECTIVE BOX
ccSOB  Jena 76 y/o M adm with PMHx of ESRD on HD T/T/S, HTN, CAD s/p 1 stent, DM, Loop Recorder placed a year ago presents with c/o dyspnea. As per pts wife, pt was sleeping and around 2am pt woke up from his sleep and c/o difficulty breathing. Pts wife called 911 and he was brought to the ED. Pt had no chest pain, dizziness, palpitations, N/V/D, Abdominal pain, fever chills. As per wife, pt coughs intermittently. Pts last dialysis was on Wednesday due to a schedule change and has not been dialyzed since then.      Allergies NKDA    REVIEW OF SYSTEMS:    CONSTITUTIONAL: No weakness, fevers or chills  EYES/ENT: No visual changes;  No vertigo or throat pain   NECK: No pain or stiffness  RESPIRATORY: No cough, wheezing, hemoptysis; No shortness of breath  CARDIOVASCULAR: No chest pain or palpitations  GASTROINTESTINAL: No abdominal or epigastric pain. No nausea, vomiting, or hematemesis; No diarrhea or constipation. No melena or hematochezia.  GENITOURINARY: No dysuria, frequency or hematuria  NEUROLOGICAL: No numbness or weakness  SKIN: No itching, burning, rashes, or lesions   All other review of systems is negative unless indicated above.        Home Medications:   * Patient Currently Takes Medications as of 07-Jul-2018 11:23 documented in Structured Notes  · 	hydrALAZINE 10 mg oral tablet: 1 tab(s) orally 2 times a day, Last Dose Taken:    · 	carvedilol 12.5 mg oral tablet: 1 tab(s) orally once a day, Last Dose Taken:    · 	NIFEdipine 60 mg oral tablet, extended release: 1 tab(s) orally once a day at 6:00AM, Last Dose Taken:    · 	B-12 1000 mcg oral tablet, extended release: 1 tab(s) orally once a day , Last Dose Taken:    · 	atorvastatin 40 mg oral tablet: 1 tab(s) orally once a day (at bedtime), Last Dose Taken:    · 	aspirin 81 mg oral delayed release tablet: 1 tab(s) orally once a day, Last Dose Taken:    · 	glimepiride 2 mg oral tablet: 1 tab(s) orally once a day, Last Dose Taken:    · 	losartan 100 mg oral tablet: 1 tab(s) orally once a day, Last Dose Taken:    .    Patient History:   Past Medical History:  CAD (coronary artery disease)  s/p 1 stent  Dementia    DM (diabetes mellitus)    ESRD (end stage renal disease)    Fall  s/p L.arm fracture  HLD (hyperlipidemia)    HTN (hypertension)    PVD (peripheral vascular disease)    Thrombocytopenia.    Past Surgical History:  AVF (arteriovenous fistula)    H/O foot surgery  sec to ankle fracture  S/P cataract extraction    S/P cholecystectomy    S/P coronary artery stent placement.    Family History:  No pertinent family history in first degree relatives.    Social History:  Social History (marital status, living situation, occupation, tobacco use, alcohol and drug use, and sexual history): Pt does not smoke or drink etoh.  Pt lives with his wife, does not ambulate. Has a 12 hour home health aid every day.	    Physical exam    General: WN/WD NAD  PERRLA  Neurology: A&Ox3, nonfocal, PETERSEN x 4  Respiratory: CTA B/L  CV: RRR, S1S2, no murmurs, rubs or gallops  Abdominal: Soft, NT, ND +BS, Last BM  Extremities: No edema, + peripheral pulses  Skin Normal     labs    Lab Results:  CBC  CBC Full  -  ( 07 Jul 2018 04:20 )  WBC Count : 9.19 K/uL  Hemoglobin : 8.6 g/dL  Hematocrit : 26.8 %  Platelet Count - Automated : 199 K/uL  Mean Cell Volume : 86.5 fL  Mean Cell Hemoglobin : 27.7 pg  Mean Cell Hemoglobin Concentration : 32.1 %  Auto Neutrophil # : 6.25 K/uL  Auto Lymphocyte # : 1.77 K/uL  Auto Monocyte # : 0.80 K/uL  Auto Eosinophil # : 0.28 K/uL  Auto Basophil # : 0.07 K/uL  Auto Neutrophil % : 68.0 %  Auto Lymphocyte % : 19.3 %  Auto Monocyte % : 8.7 %  Auto Eosinophil % : 3.0 %  Auto Basophil % : 0.8 %    .		Differential:	[] Automated		[] Manual  Chemistry                        8.6    9.19  )-----------( 199      ( 07 Jul 2018 04:20 )             26.8     07-07    138  |  97<L>  |  54<H>  ----------------------------<  124<H>  5.8<H>   |  25  |  6.49<H>    Ca    7.7<L>      07 Jul 2018 04:20  Phos  3.5     07-07  Mg     2.2     07-07    TPro  7.0  /  Alb  3.5  /  TBili  0.5  /  DBili  x   /  AST  15  /  ALT  12  /  AlkPhos  44  07-07    LIVER FUNCTIONS - ( 07 Jul 2018 04:20 )  Alb: 3.5 g/dL / Pro: 7.0 g/dL / ALK PHOS: 44 u/L / ALT: 12 u/L / AST: 15 u/L / GGT: x                     MICROBIOLOGY/CULTURES:      RADIOLOGY RESULTS: reviewed

## 2018-07-07 NOTE — ED ADULT NURSE REASSESSMENT NOTE - NS ED NURSE REASSESS COMMENT FT1
received report from night RN, patient A&Ox3, remains hypertensive post medication, MD Brand at bedside, denies any pain or discomfort, O2 sat 100% MD HARDEEP states to hold off on any PO until contact with nephrologist, patient to be admitted for dialysis tmt today.

## 2018-07-07 NOTE — PATIENT PROFILE ADULT. - FALL HARM RISK
bones(Osteoporosis,prev fx,steroid use,metastatic bone ca/other/left arm cast s/p fall three days ago

## 2018-07-07 NOTE — H&P ADULT - NSHPSOCIALHISTORY_GEN_ALL_CORE
Pt does not smoke or drink etoh.   Pt lives with his wife, does not ambulate. Has a 12 hour home health aid every day.

## 2018-07-07 NOTE — ED SUB INTERN NOTE - OBJECTIVE STATEMENT FT
78 yo male with PMHx of CKD on ESRD, HTN, CAD with stents, DM who presents with dyspnea x 1 hr. History obtained from family member as pt does not speak English. Onset of dyspnea was acute, woke patient up from sleep. No associated CP. Endorses dry cough x 1 week with subjective fevers. Pt has also complained of intermittent palpitations in past week. Normal dialysis schedule in T/Th/Sat, but this week has had altered schedule. Last dialysis was this Wednesday. Pt dependent on family for all ADLs. Denies any leg swelling. Pt has HTN and baseline SBPs are in 160-170s. No recent medication changse.

## 2018-07-07 NOTE — ED PROVIDER NOTE - MEDICAL DECISION MAKING DETAILS
Pt p/w SOB this am, well appearing, mild wheeze.  ECG changes concerning for Hyper K.  Will send labs, provide empiric Hyper K Tx, keep on monitor, CXR, and likely admit.  Possible emergent HD.

## 2018-07-08 ENCOUNTER — TRANSCRIPTION ENCOUNTER (OUTPATIENT)
Age: 78
End: 2018-07-08

## 2018-07-08 VITALS — RESPIRATION RATE: 18 BRPM | OXYGEN SATURATION: 100 %

## 2018-07-08 DIAGNOSIS — J90 PLEURAL EFFUSION, NOT ELSEWHERE CLASSIFIED: ICD-10-CM

## 2018-07-08 DIAGNOSIS — D63.1 ANEMIA IN CHRONIC KIDNEY DISEASE: ICD-10-CM

## 2018-07-08 DIAGNOSIS — I25.10 ATHEROSCLEROTIC HEART DISEASE OF NATIVE CORONARY ARTERY WITHOUT ANGINA PECTORIS: ICD-10-CM

## 2018-07-08 DIAGNOSIS — I16.1 HYPERTENSIVE EMERGENCY: ICD-10-CM

## 2018-07-08 LAB
BASOPHILS # BLD AUTO: 0.05 K/UL — SIGNIFICANT CHANGE UP (ref 0–0.2)
BASOPHILS NFR BLD AUTO: 0.9 % — SIGNIFICANT CHANGE UP (ref 0–2)
BUN SERPL-MCNC: 22 MG/DL — SIGNIFICANT CHANGE UP (ref 7–23)
CALCIUM SERPL-MCNC: 8.1 MG/DL — LOW (ref 8.4–10.5)
CHLORIDE SERPL-SCNC: 97 MMOL/L — LOW (ref 98–107)
CO2 SERPL-SCNC: 29 MMOL/L — SIGNIFICANT CHANGE UP (ref 22–31)
CREAT SERPL-MCNC: 3.76 MG/DL — HIGH (ref 0.5–1.3)
EOSINOPHIL # BLD AUTO: 0.2 K/UL — SIGNIFICANT CHANGE UP (ref 0–0.5)
EOSINOPHIL NFR BLD AUTO: 3.5 % — SIGNIFICANT CHANGE UP (ref 0–6)
GLUCOSE SERPL-MCNC: 136 MG/DL — HIGH (ref 70–99)
HCT VFR BLD CALC: 26.7 % — LOW (ref 39–50)
HGB BLD-MCNC: 8.2 G/DL — LOW (ref 13–17)
IMM GRANULOCYTES # BLD AUTO: 0.01 # — SIGNIFICANT CHANGE UP
IMM GRANULOCYTES NFR BLD AUTO: 0.2 % — SIGNIFICANT CHANGE UP (ref 0–1.5)
LYMPHOCYTES # BLD AUTO: 1.44 K/UL — SIGNIFICANT CHANGE UP (ref 1–3.3)
LYMPHOCYTES # BLD AUTO: 25.5 % — SIGNIFICANT CHANGE UP (ref 13–44)
MAGNESIUM SERPL-MCNC: 2.2 MG/DL — SIGNIFICANT CHANGE UP (ref 1.6–2.6)
MCHC RBC-ENTMCNC: 27.7 PG — SIGNIFICANT CHANGE UP (ref 27–34)
MCHC RBC-ENTMCNC: 30.7 % — LOW (ref 32–36)
MCV RBC AUTO: 90.2 FL — SIGNIFICANT CHANGE UP (ref 80–100)
MONOCYTES # BLD AUTO: 0.64 K/UL — SIGNIFICANT CHANGE UP (ref 0–0.9)
MONOCYTES NFR BLD AUTO: 11.3 % — SIGNIFICANT CHANGE UP (ref 2–14)
NEUTROPHILS # BLD AUTO: 3.3 K/UL — SIGNIFICANT CHANGE UP (ref 1.8–7.4)
NEUTROPHILS NFR BLD AUTO: 58.6 % — SIGNIFICANT CHANGE UP (ref 43–77)
NRBC # FLD: 0 — SIGNIFICANT CHANGE UP
PLATELET # BLD AUTO: 205 K/UL — SIGNIFICANT CHANGE UP (ref 150–400)
PMV BLD: 10 FL — SIGNIFICANT CHANGE UP (ref 7–13)
POTASSIUM SERPL-MCNC: 4.3 MMOL/L — SIGNIFICANT CHANGE UP (ref 3.5–5.3)
POTASSIUM SERPL-SCNC: 4.3 MMOL/L — SIGNIFICANT CHANGE UP (ref 3.5–5.3)
RBC # BLD: 2.96 M/UL — LOW (ref 4.2–5.8)
RBC # FLD: 15.2 % — HIGH (ref 10.3–14.5)
SODIUM SERPL-SCNC: 138 MMOL/L — SIGNIFICANT CHANGE UP (ref 135–145)
WBC # BLD: 5.64 K/UL — SIGNIFICANT CHANGE UP (ref 3.8–10.5)
WBC # FLD AUTO: 5.64 K/UL — SIGNIFICANT CHANGE UP (ref 3.8–10.5)

## 2018-07-08 RX ORDER — HYDRALAZINE HCL 50 MG
1 TABLET ORAL
Qty: 60 | Refills: 0 | OUTPATIENT
Start: 2018-07-08 | End: 2018-08-06

## 2018-07-08 RX ORDER — HYDRALAZINE HCL 50 MG
25 TABLET ORAL EVERY 12 HOURS
Qty: 0 | Refills: 0 | Status: DISCONTINUED | OUTPATIENT
Start: 2018-07-08 | End: 2018-07-08

## 2018-07-08 RX ORDER — NIFEDIPINE 30 MG
1 TABLET, EXTENDED RELEASE 24 HR ORAL
Qty: 30 | Refills: 0 | OUTPATIENT
Start: 2018-07-08 | End: 2018-08-06

## 2018-07-08 RX ADMIN — CARVEDILOL PHOSPHATE 12.5 MILLIGRAM(S): 80 CAPSULE, EXTENDED RELEASE ORAL at 05:26

## 2018-07-08 RX ADMIN — Medication 60 MILLIGRAM(S): at 05:26

## 2018-07-08 RX ADMIN — Medication 10 MILLIGRAM(S): at 05:26

## 2018-07-08 RX ADMIN — LOSARTAN POTASSIUM 100 MILLIGRAM(S): 100 TABLET, FILM COATED ORAL at 05:26

## 2018-07-08 RX ADMIN — HEPARIN SODIUM 5000 UNIT(S): 5000 INJECTION INTRAVENOUS; SUBCUTANEOUS at 05:26

## 2018-07-08 RX ADMIN — Medication 81 MILLIGRAM(S): at 12:50

## 2018-07-08 RX ADMIN — Medication 2: at 12:50

## 2018-07-08 RX ADMIN — HEPARIN SODIUM 5000 UNIT(S): 5000 INJECTION INTRAVENOUS; SUBCUTANEOUS at 12:50

## 2018-07-08 NOTE — DISCHARGE NOTE ADULT - HOSPITAL COURSE
76 y/o M adm with PMHx of ESRD on HD, HTN, CAD s/p 1 stent, DM, Loop Recorder placed a year ago presents with c/o dyspnea. As per pts wife, pt was sleeping and around 2am pt woke up from his sleep and c/o difficulty breathing. Pts wife called 911 and he was brought to the ED. Pt had no chest pain, dizziness, palpitations, N/V/D, Abdominal pain, fever chills. As per wife, pt coughs intermittently. Pts last dialysis was on Wednesday due to a schedule change and has not been dialyzed since then.     (+) Hyperkalemia 5.8 treated in ED  Anemia of CKD - h/h 8.6 /26.6     CXR: Bilateral pleural effusions.  EKG: Sinus @ 84bpm.      Discussed with MD - pt stable for discharge home, pt with no complaints, discharge medications reviewed with MD.

## 2018-07-08 NOTE — PROGRESS NOTE ADULT - ATTENDING COMMENTS
clinically improved  no chest pain, sob  sbp improved  stable for d/c home   inc hydral to 25mg
[facility] NEPHROLOGY  Ihsan Saba M.D.  dEuardo Romano D.O.  Loni Ribeiro M.D.  Maris Ramirez, MSN, ANP-C    Telephone: [phone]  Facsimile: [phone]    [address] [place] [address]

## 2018-07-08 NOTE — PROGRESS NOTE ADULT - SUBJECTIVE AND OBJECTIVE BOX
[facility] NEPHROLOGY- CONSULTATION NOTE    Patient is a 77y Male with ESRD on HD TTS, Dr. Romano, [facility], who presented to the hospital with SOB overnight.  He was last dialyzed three days ago (he was off schedule due to scheduling change) and was for dialysis today.  He has CAD for which he follows with Dr. Flaherty.  He was found to have severe HTN.    Pt had HD yesterday, tolerated well. BP improved.  No h/a. NO SOB today.      REVIEW OF SYSTEMS:  CONSTITUTIONAL: + generalized weakness. No fevers or chills  EYES/ENT: No visual changes;  No vertigo or throat pain   NECK: No pain or stiffness  RESPIRATORY: No cough, wheezing, hemoptysis; no shortness of breath today  CARDIOVASCULAR: No chest pain or palpitations.  GASTROINTESTINAL: No abdominal or epigastric pain. No nausea, vomiting, or hematemesis; No diarrhea or constipation. No melena or hematochezia.  GENITOURINARY: No dysuria, frequency, foamy urine, urinary urgency, incontinence or hematuria  NEUROLOGICAL: No numbness or weakness  SKIN: No itching, burning, rashes, or lesions   VASCULAR: No bilateral lower extremity edema.   All other review of systems is negative unless indicated above.    VITALS:  T(F): 98.3 (07-08-18 @ 05:24), Max: 99 (07-07-18 @ 19:30)  HR: 80 (07-08-18 @ 05:24)  BP: 158/54 (07-08-18 @ 05:24)  RR: 19 (07-08-18 @ 05:24)  SpO2: 100% (07-08-18 @ 05:24)  Wt(kg): --    07-07 @ 07:01  -  07-08 @ 07:00  --------------------------------------------------------  IN: 400 mL / OUT: 2100 mL / NET: -1700 mL      PHYSICAL EXAM:  Constitutional: NAD  HEENT: anicteric sclera, oropharynx clear, MMM  Neck: No JVD  Respiratory: decrased BS B bases with a few crackles  Cardiovascular: S1, S2, RRR  Gastrointestinal: BS+, soft, NT/ND  Extremities: No cyanosis or clubbing. No peripheral edema  Neurological: A/O x 3, no focal deficits  Psychiatric: Normal mood, normal affect  : No CVA tenderness. No cordoba.   Skin: No rashes  Vascular Access: Left AVF benign, +thrill +bruit      LABS:  07-08    138  |  97<L>  |  22  ----------------------------<  136<H>  4.3   |  29  |  3.76<H>    Ca    8.1<L>      08 Jul 2018 06:04  Phos  3.5     07-07  Mg     2.2     07-08    TPro  7.0  /  Alb  3.5  /  TBili  0.5  /  DBili      /  AST  15  /  ALT  12  /  AlkPhos  44  07-07    Creatinine Trend: 3.76 <--, 6.49 <--                        8.2    5.64  )-----------( 205      ( 08 Jul 2018 06:04 )             26.7

## 2018-07-08 NOTE — DISCHARGE NOTE ADULT - MEDICATION SUMMARY - MEDICATIONS TO CHANGE
I will SWITCH the dose or number of times a day I take the medications listed below when I get home from the hospital:    hydrALAZINE 10 mg oral tablet  -- 1 tab(s) by mouth 2 times a day

## 2018-07-08 NOTE — PROGRESS NOTE ADULT - SUBJECTIVE AND OBJECTIVE BOX
CARDIOLOGY FOLLOW UP NOTE - DR. COLIN    Subjective:      PHYSICAL EXAM:  T(C): 36.8 (07-08-18 @ 05:24), Max: 37.2 (07-07-18 @ 19:30)  HR: 80 (07-08-18 @ 05:24) (73 - 96)  BP: 158/54 (07-08-18 @ 05:24) (155/75 - 234/69)  RR: 19 (07-08-18 @ 05:24) (18 - 24)  SpO2: 100% (07-08-18 @ 05:24) (100% - 100%)  Wt(kg): --  I&O's Summary    07 Jul 2018 07:01  -  08 Jul 2018 07:00  --------------------------------------------------------  IN: 400 mL / OUT: 2100 mL / NET: -1700 mL        Appearance: Normal	  Cardiovascular: Normal S1 S2,RRR, No JVD, No murmurs  Respiratory: Lungs clear to auscultation	  Gastrointestinal:  Soft, Non-tender, + BS	  Extremities: Normal range of motion, No clubbing, cyanosis or edema  Vascular: Peripheral pulses palpable 2+ bilaterally    MEDICATIONS  (STANDING):  aspirin enteric coated 81 milliGRAM(s) Oral daily  atorvastatin 40 milliGRAM(s) Oral at bedtime  carvedilol 12.5 milliGRAM(s) Oral daily  dextrose 5%. 1000 milliLiter(s) (50 mL/Hr) IV Continuous <Continuous>  dextrose 50% Injectable 12.5 Gram(s) IV Push once  dextrose 50% Injectable 25 Gram(s) IV Push once  dextrose 50% Injectable 25 Gram(s) IV Push once  heparin  Injectable 5000 Unit(s) SubCutaneous every 8 hours  hydrALAZINE 10 milliGRAM(s) Oral two times a day  insulin lispro (HumaLOG) corrective regimen sliding scale   SubCutaneous three times a day before meals  insulin lispro (HumaLOG) corrective regimen sliding scale   SubCutaneous at bedtime  losartan 100 milliGRAM(s) Oral daily  NIFEdipine XL 60 milliGRAM(s) Oral daily      TELEMETRY: 	    ECG:  	  RADIOLOGY:   DIAGNOSTIC TESTING:  [ ] Echocardiogram:  [ ] Catheterization:  [ ] Stress Test:    OTHER: 	    LABS:	 	    CARDIAC MARKERS:                                8.2    5.64  )-----------( 205      ( 08 Jul 2018 06:04 )             26.7     07-08    138  |  97<L>  |  22  ----------------------------<  136<H>  4.3   |  29  |  3.76<H>    Ca    8.1<L>      08 Jul 2018 06:04  Phos  3.5     07-07  Mg     2.2     07-08    TPro  7.0  /  Alb  3.5  /  TBili  0.5  /  DBili  x   /  AST  15  /  ALT  12  /  AlkPhos  44  07-07    proBNP:     Lipid Profile:   HgA1c:

## 2018-07-08 NOTE — PROGRESS NOTE ADULT - ASSESSMENT
Problem/Plan - 1:  ·  Problem: Shortness of breath.  Plan: telemonitor   Continue current medications.   check echo  cards fu  Ischemia lorenzana as per cards    Problem/Plan - 2:  ·  Problem: ESRD (end stage renal disease).  Plan: monitor cr  renal fu    Problem/Plan - 3:  ·  Problem: CAD (coronary artery disease).  Plan: Continue ASA, Lipitor.     Problem/Plan - 4:  ·  Problem: HLD (hyperlipidemia).  Plan: Continue Lipitor.     Problem/Plan - 5:  ·  Problem: DM (diabetes mellitus).  Plan: Continue Insulin Sliding Scale.   Monitor Fingersticks.     Problem/Plan - 6:  Problem: HTN (hypertension). Plan: Continue Nifedipine, Coreg, Hydralazine, Losartan.   Monitor BP.

## 2018-07-08 NOTE — PROGRESS NOTE ADULT - ASSESSMENT
77 year-old man with ESRD on HD with hypertensive emergency secondary to volume overload.  B pleural effusions.

## 2018-07-08 NOTE — PROGRESS NOTE ADULT - SUBJECTIVE AND OBJECTIVE BOX
Patient is a 77y old  Male who presents with a chief complaint of "Shortness of breath" (08 Jul 2018 11:16)      INTERVAL HPI/OVERNIGHT EVENTS:  T(C): 35.6 (07-08-18 @ 13:41), Max: 37.2 (07-07-18 @ 19:30)  HR: 73 (07-08-18 @ 13:41) (73 - 96)  BP: 127/53 (07-08-18 @ 13:41) (127/53 - 185/71)  RR: 18 (07-08-18 @ 14:33) (18 - 19)  SpO2: 100% (07-08-18 @ 14:33) (100% - 100%)  Wt(kg): --  I&O's Summary    07 Jul 2018 07:01  -  08 Jul 2018 07:00  --------------------------------------------------------  IN: 400 mL / OUT: 2100 mL / NET: -1700 mL        LABS:                        8.2    5.64  )-----------( 205      ( 08 Jul 2018 06:04 )             26.7     07-08    138  |  97<L>  |  22  ----------------------------<  136<H>  4.3   |  29  |  3.76<H>    Ca    8.1<L>      08 Jul 2018 06:04  Phos  3.5     07-07  Mg     2.2     07-08    TPro  7.0  /  Alb  3.5  /  TBili  0.5  /  DBili  x   /  AST  15  /  ALT  12  /  AlkPhos  44  07-07        CAPILLARY BLOOD GLUCOSE      POCT Blood Glucose.: 203 mg/dL (08 Jul 2018 12:10)  POCT Blood Glucose.: 135 mg/dL (08 Jul 2018 08:18)  POCT Blood Glucose.: 114 mg/dL (07 Jul 2018 21:51)  POCT Blood Glucose.: 102 mg/dL (07 Jul 2018 19:07)            MEDICATIONS  (STANDING):  aspirin enteric coated 81 milliGRAM(s) Oral daily  atorvastatin 40 milliGRAM(s) Oral at bedtime  carvedilol 12.5 milliGRAM(s) Oral daily  dextrose 5%. 1000 milliLiter(s) (50 mL/Hr) IV Continuous <Continuous>  dextrose 50% Injectable 12.5 Gram(s) IV Push once  dextrose 50% Injectable 25 Gram(s) IV Push once  dextrose 50% Injectable 25 Gram(s) IV Push once  heparin  Injectable 5000 Unit(s) SubCutaneous every 8 hours  hydrALAZINE 25 milliGRAM(s) Oral every 12 hours  insulin lispro (HumaLOG) corrective regimen sliding scale   SubCutaneous three times a day before meals  insulin lispro (HumaLOG) corrective regimen sliding scale   SubCutaneous at bedtime  losartan 100 milliGRAM(s) Oral daily  NIFEdipine XL 60 milliGRAM(s) Oral daily    MEDICATIONS  (PRN):  dextrose 40% Gel 15 Gram(s) Oral once PRN Blood Glucose LESS THAN 70 milliGRAM(s)/deciliter  glucagon  Injectable 1 milliGRAM(s) IntraMuscular once PRN Glucose LESS THAN 70 milligrams/deciliter  sodium chloride 0.9% lock flush 3 milliLiter(s) IV Push every 8 hours PRN flush          PHYSICAL EXAM:  GENERAL: NAD, well-groomed, well-developed  HEAD:  Atraumatic, Normocephalic  CHEST/LUNG: Clear to percussion bilaterally; No rales, rhonchi, wheezing, or rubs  HEART: Regular rate and rhythm; No murmurs, rubs, or gallops  ABDOMEN: Soft, Nontender, Nondistended; Bowel sounds present  EXTREMITIES:  2+ Peripheral Pulses, No clubbing, cyanosis, or edema  LYMPH: No lymphadenopathy noted  SKIN: No rashes or lesions    Care Discussed with Consultants/Other Providers [ ] YES  [ ] NO

## 2018-07-08 NOTE — DISCHARGE NOTE ADULT - ADDITIONAL INSTRUCTIONS
follow up with your PMD in one week - call for appointment  follow up with your Nephrologist in one week - call for appointment  resume hemodialysis as before

## 2018-07-08 NOTE — DISCHARGE NOTE ADULT - CARE PLAN
Principal Discharge DX:	ESRD (end stage renal disease)  Goal:	do not miss hemodialyisis  Assessment and plan of treatment:	Please follow up with your nephrologist for management, and continue you schedule hemodialysis.

## 2018-07-08 NOTE — DISCHARGE NOTE ADULT - PATIENT PORTAL LINK FT
You can access the [facility], offered by [facility], by registering with the following website: [email or web address]

## 2018-07-08 NOTE — DISCHARGE NOTE ADULT - PLAN OF CARE
do not miss hemodialyisis Please follow up with your nephrologist for management, and continue you schedule hemodialysis.

## 2018-07-08 NOTE — DISCHARGE NOTE ADULT - MEDICATION SUMMARY - MEDICATIONS TO TAKE
I will START or STAY ON the medications listed below when I get home from the hospital:    aspirin 81 mg oral delayed release tablet  -- 1 tab(s) by mouth once a day  -- Indication: For CAD (coronary artery disease)    losartan 100 mg oral tablet  -- 1 tab(s) by mouth once a day  -- Indication: For HTN (hypertension)    glimepiride 2 mg oral tablet  -- 1 tab(s) by mouth once a day  -- Indication: For DM (diabetes mellitus)    atorvastatin 40 mg oral tablet  -- 1 tab(s) by mouth once a day (at bedtime)  -- Indication: For Hyperlipidemia    carvedilol 12.5 mg oral tablet  -- 1 tab(s) by mouth once a day  -- Indication: For HTN (hypertension)    NIFEdipine 60 mg oral tablet, extended release  -- 1 tab(s) by mouth once a day  -- Indication: For HTN (hypertension)    hydrALAZINE 25 mg oral tablet  -- 1 tab(s) by mouth every 12 hours  -- Indication: For HTN (hypertension)    B-12 1000 mcg oral tablet, extended release  -- 1 tab(s) by mouth once a day   -- Check with your doctor before becoming pregnant.    -- Indication: For Supplement

## 2018-07-08 NOTE — DISCHARGE NOTE ADULT - CARE PROVIDER_API CALL
Karsten Flaherty (MD), Cardiovascular Disease; Internal Medicine; Interventional Cardiology; Nuclear Cardiology  [address]  [place] [address]  Phone: [phone]  Fax: [phone]

## 2018-07-09 ENCOUNTER — APPOINTMENT (OUTPATIENT)
Dept: HOME HEALTH SERVICES | Facility: HOME HEALTH | Age: 78
End: 2018-07-09

## 2018-07-14 ENCOUNTER — INPATIENT (INPATIENT)
Facility: HOSPITAL | Age: 78
LOS: 2 days | Discharge: HOME | End: 2018-07-17
Attending: INTERNAL MEDICINE | Admitting: INTERNAL MEDICINE
Payer: MEDICARE

## 2018-07-14 VITALS
RESPIRATION RATE: 22 BRPM | HEART RATE: 99 BPM | SYSTOLIC BLOOD PRESSURE: 192 MMHG | TEMPERATURE: 99 F | DIASTOLIC BLOOD PRESSURE: 79 MMHG | OXYGEN SATURATION: 99 %

## 2018-07-14 DIAGNOSIS — B33.8 OTHER SPECIFIED VIRAL DISEASES: ICD-10-CM

## 2018-07-14 DIAGNOSIS — A41.9 SEPSIS, UNSPECIFIED ORGANISM: ICD-10-CM

## 2018-07-14 DIAGNOSIS — J96.01 ACUTE RESPIRATORY FAILURE WITH HYPOXIA: ICD-10-CM

## 2018-07-14 DIAGNOSIS — J40 BRONCHITIS, NOT SPECIFIED AS ACUTE OR CHRONIC: ICD-10-CM

## 2018-07-14 DIAGNOSIS — I10 ESSENTIAL (PRIMARY) HYPERTENSION: ICD-10-CM

## 2018-07-14 DIAGNOSIS — E11.9 TYPE 2 DIABETES MELLITUS WITHOUT COMPLICATIONS: ICD-10-CM

## 2018-07-14 DIAGNOSIS — Z29.9 ENCOUNTER FOR PROPHYLACTIC MEASURES, UNSPECIFIED: ICD-10-CM

## 2018-07-14 DIAGNOSIS — N18.6 END STAGE RENAL DISEASE: ICD-10-CM

## 2018-07-14 DIAGNOSIS — R50.9 FEVER, UNSPECIFIED: ICD-10-CM

## 2018-07-14 DIAGNOSIS — I25.10 ATHEROSCLEROTIC HEART DISEASE OF NATIVE CORONARY ARTERY WITHOUT ANGINA PECTORIS: ICD-10-CM

## 2018-07-14 DIAGNOSIS — Z98.89 OTHER SPECIFIED POSTPROCEDURAL STATES: Chronic | ICD-10-CM

## 2018-07-14 DIAGNOSIS — G93.40 ENCEPHALOPATHY, UNSPECIFIED: ICD-10-CM

## 2018-07-14 DIAGNOSIS — I12.0 HYPERTENSIVE CHRONIC KIDNEY DISEASE WITH STAGE 5 CHRONIC KIDNEY DISEASE OR END STAGE RENAL DISEASE: ICD-10-CM

## 2018-07-14 LAB
ALBUMIN SERPL ELPH-MCNC: 3.6 G/DL — SIGNIFICANT CHANGE UP (ref 3.3–5)
ALP SERPL-CCNC: 34 U/L — LOW (ref 40–120)
ALT FLD-CCNC: 11 U/L — SIGNIFICANT CHANGE UP (ref 4–41)
APPEARANCE UR: CLEAR — SIGNIFICANT CHANGE UP
AST SERPL-CCNC: 19 U/L — SIGNIFICANT CHANGE UP (ref 4–40)
B PERT DNA SPEC QL NAA+PROBE: SIGNIFICANT CHANGE UP
BASE EXCESS BLDV CALC-SCNC: 6 MMOL/L — SIGNIFICANT CHANGE UP
BASOPHILS # BLD AUTO: 0.01 K/UL — SIGNIFICANT CHANGE UP (ref 0–0.2)
BASOPHILS NFR BLD AUTO: 0.2 % — SIGNIFICANT CHANGE UP (ref 0–2)
BILIRUB SERPL-MCNC: 0.4 MG/DL — SIGNIFICANT CHANGE UP (ref 0.2–1.2)
BILIRUB UR-MCNC: NEGATIVE — SIGNIFICANT CHANGE UP
BLOOD GAS VENOUS - CREATININE: 5.11 MG/DL — HIGH (ref 0.5–1.3)
BLOOD UR QL VISUAL: NEGATIVE — SIGNIFICANT CHANGE UP
BUN SERPL-MCNC: 39 MG/DL — HIGH (ref 7–23)
C PNEUM DNA SPEC QL NAA+PROBE: NOT DETECTED — SIGNIFICANT CHANGE UP
CALCIUM SERPL-MCNC: 8 MG/DL — LOW (ref 8.4–10.5)
CHLORIDE BLDV-SCNC: 99 MMOL/L — SIGNIFICANT CHANGE UP (ref 96–108)
CHLORIDE SERPL-SCNC: 93 MMOL/L — LOW (ref 98–107)
CO2 SERPL-SCNC: 28 MMOL/L — SIGNIFICANT CHANGE UP (ref 22–31)
COLOR SPEC: YELLOW — SIGNIFICANT CHANGE UP
CREAT SERPL-MCNC: 4.71 MG/DL — HIGH (ref 0.5–1.3)
EOSINOPHIL # BLD AUTO: 0.02 K/UL — SIGNIFICANT CHANGE UP (ref 0–0.5)
EOSINOPHIL NFR BLD AUTO: 0.4 % — SIGNIFICANT CHANGE UP (ref 0–6)
FLUAV H1 2009 PAND RNA SPEC QL NAA+PROBE: NOT DETECTED — SIGNIFICANT CHANGE UP
FLUAV H1 RNA SPEC QL NAA+PROBE: NOT DETECTED — SIGNIFICANT CHANGE UP
FLUAV H3 RNA SPEC QL NAA+PROBE: NOT DETECTED — SIGNIFICANT CHANGE UP
FLUAV SUBTYP SPEC NAA+PROBE: SIGNIFICANT CHANGE UP
FLUBV RNA SPEC QL NAA+PROBE: NOT DETECTED — SIGNIFICANT CHANGE UP
GAS PNL BLDV: 136 MMOL/L — SIGNIFICANT CHANGE UP (ref 136–146)
GLUCOSE BLDV-MCNC: 161 — HIGH (ref 70–99)
GLUCOSE SERPL-MCNC: 167 MG/DL — HIGH (ref 70–99)
GLUCOSE UR-MCNC: 50 — SIGNIFICANT CHANGE UP
HADV DNA SPEC QL NAA+PROBE: NOT DETECTED — SIGNIFICANT CHANGE UP
HCO3 BLDV-SCNC: 29 MMOL/L — HIGH (ref 20–27)
HCOV 229E RNA SPEC QL NAA+PROBE: NOT DETECTED — SIGNIFICANT CHANGE UP
HCOV HKU1 RNA SPEC QL NAA+PROBE: NOT DETECTED — SIGNIFICANT CHANGE UP
HCOV NL63 RNA SPEC QL NAA+PROBE: NOT DETECTED — SIGNIFICANT CHANGE UP
HCOV OC43 RNA SPEC QL NAA+PROBE: NOT DETECTED — SIGNIFICANT CHANGE UP
HCT VFR BLD CALC: 28.7 % — LOW (ref 39–50)
HCT VFR BLDV CALC: 29.8 % — LOW (ref 39–51)
HGB BLD-MCNC: 8.8 G/DL — LOW (ref 13–17)
HGB BLDV-MCNC: 9.6 G/DL — LOW (ref 13–17)
HMPV RNA SPEC QL NAA+PROBE: NOT DETECTED — SIGNIFICANT CHANGE UP
HPIV1 RNA SPEC QL NAA+PROBE: NOT DETECTED — SIGNIFICANT CHANGE UP
HPIV2 RNA SPEC QL NAA+PROBE: NOT DETECTED — SIGNIFICANT CHANGE UP
HPIV3 RNA SPEC QL NAA+PROBE: POSITIVE — HIGH
HPIV4 RNA SPEC QL NAA+PROBE: NOT DETECTED — SIGNIFICANT CHANGE UP
IMM GRANULOCYTES # BLD AUTO: 0.02 # — SIGNIFICANT CHANGE UP
IMM GRANULOCYTES NFR BLD AUTO: 0.4 % — SIGNIFICANT CHANGE UP (ref 0–1.5)
KETONES UR-MCNC: NEGATIVE — SIGNIFICANT CHANGE UP
LACTATE BLDV-MCNC: 1.3 MMOL/L — SIGNIFICANT CHANGE UP (ref 0.5–2)
LEUKOCYTE ESTERASE UR-ACNC: NEGATIVE — SIGNIFICANT CHANGE UP
LYMPHOCYTES # BLD AUTO: 1 K/UL — SIGNIFICANT CHANGE UP (ref 1–3.3)
LYMPHOCYTES # BLD AUTO: 17.7 % — SIGNIFICANT CHANGE UP (ref 13–44)
M PNEUMO DNA SPEC QL NAA+PROBE: NOT DETECTED — SIGNIFICANT CHANGE UP
MCHC RBC-ENTMCNC: 27.3 PG — SIGNIFICANT CHANGE UP (ref 27–34)
MCHC RBC-ENTMCNC: 30.7 % — LOW (ref 32–36)
MCV RBC AUTO: 89.1 FL — SIGNIFICANT CHANGE UP (ref 80–100)
MONOCYTES # BLD AUTO: 0.54 K/UL — SIGNIFICANT CHANGE UP (ref 0–0.9)
MONOCYTES NFR BLD AUTO: 9.6 % — SIGNIFICANT CHANGE UP (ref 2–14)
NEUTROPHILS # BLD AUTO: 4.06 K/UL — SIGNIFICANT CHANGE UP (ref 1.8–7.4)
NEUTROPHILS NFR BLD AUTO: 71.7 % — SIGNIFICANT CHANGE UP (ref 43–77)
NITRITE UR-MCNC: NEGATIVE — SIGNIFICANT CHANGE UP
NRBC # FLD: 0 — SIGNIFICANT CHANGE UP
PCO2 BLDV: 48 MMHG — SIGNIFICANT CHANGE UP (ref 41–51)
PH BLDV: 7.42 PH — SIGNIFICANT CHANGE UP (ref 7.32–7.43)
PH UR: 8.5 — HIGH (ref 4.6–8)
PLATELET # BLD AUTO: 159 K/UL — SIGNIFICANT CHANGE UP (ref 150–400)
PMV BLD: 10.1 FL — SIGNIFICANT CHANGE UP (ref 7–13)
PO2 BLDV: 41 MMHG — HIGH (ref 35–40)
POTASSIUM BLDV-SCNC: 3.5 MMOL/L — SIGNIFICANT CHANGE UP (ref 3.4–4.5)
POTASSIUM SERPL-MCNC: 3.5 MMOL/L — SIGNIFICANT CHANGE UP (ref 3.5–5.3)
POTASSIUM SERPL-SCNC: 3.5 MMOL/L — SIGNIFICANT CHANGE UP (ref 3.5–5.3)
PROT SERPL-MCNC: 7.2 G/DL — SIGNIFICANT CHANGE UP (ref 6–8.3)
PROT UR-MCNC: 500 MG/DL — HIGH
RBC # BLD: 3.22 M/UL — LOW (ref 4.2–5.8)
RBC # FLD: 14.6 % — HIGH (ref 10.3–14.5)
RBC CASTS # UR COMP ASSIST: SIGNIFICANT CHANGE UP (ref 0–?)
RSV RNA SPEC QL NAA+PROBE: NOT DETECTED — SIGNIFICANT CHANGE UP
RV+EV RNA SPEC QL NAA+PROBE: NOT DETECTED — SIGNIFICANT CHANGE UP
SAO2 % BLDV: 69.2 % — SIGNIFICANT CHANGE UP (ref 60–85)
SODIUM SERPL-SCNC: 138 MMOL/L — SIGNIFICANT CHANGE UP (ref 135–145)
SP GR SPEC: 1.01 — SIGNIFICANT CHANGE UP (ref 1–1.04)
TROPONIN T, HIGH SENSITIVITY: 245 NG/L — CRITICAL HIGH (ref ?–14)
TROPONIN T, HIGH SENSITIVITY: 246 NG/L — CRITICAL HIGH (ref ?–14)
UROBILINOGEN FLD QL: NORMAL MG/DL — SIGNIFICANT CHANGE UP
WBC # BLD: 5.65 K/UL — SIGNIFICANT CHANGE UP (ref 3.8–10.5)
WBC # FLD AUTO: 5.65 K/UL — SIGNIFICANT CHANGE UP (ref 3.8–10.5)
WBC UR QL: SIGNIFICANT CHANGE UP (ref 0–?)

## 2018-07-14 PROCEDURE — 71045 X-RAY EXAM CHEST 1 VIEW: CPT | Mod: 26

## 2018-07-14 PROCEDURE — 99223 1ST HOSP IP/OBS HIGH 75: CPT

## 2018-07-14 RX ORDER — HEPARIN SODIUM 5000 [USP'U]/ML
5000 INJECTION INTRAVENOUS; SUBCUTANEOUS EVERY 8 HOURS
Qty: 0 | Refills: 0 | Status: DISCONTINUED | OUTPATIENT
Start: 2018-07-14 | End: 2018-07-17

## 2018-07-14 RX ORDER — CIPROFLOXACIN LACTATE 400MG/40ML
400 VIAL (ML) INTRAVENOUS ONCE
Qty: 0 | Refills: 0 | Status: COMPLETED | OUTPATIENT
Start: 2018-07-14 | End: 2018-07-14

## 2018-07-14 RX ORDER — ASPIRIN/CALCIUM CARB/MAGNESIUM 324 MG
81 TABLET ORAL DAILY
Qty: 0 | Refills: 0 | Status: DISCONTINUED | OUTPATIENT
Start: 2018-07-14 | End: 2018-07-17

## 2018-07-14 RX ORDER — ACETAMINOPHEN 500 MG
1000 TABLET ORAL ONCE
Qty: 0 | Refills: 0 | Status: COMPLETED | OUTPATIENT
Start: 2018-07-14 | End: 2018-07-14

## 2018-07-14 RX ORDER — CEFEPIME 1 G/1
1000 INJECTION, POWDER, FOR SOLUTION INTRAMUSCULAR; INTRAVENOUS ONCE
Qty: 0 | Refills: 0 | Status: COMPLETED | OUTPATIENT
Start: 2018-07-14 | End: 2018-07-14

## 2018-07-14 RX ORDER — PREGABALIN 225 MG/1
1000 CAPSULE ORAL DAILY
Qty: 0 | Refills: 0 | Status: DISCONTINUED | OUTPATIENT
Start: 2018-07-14 | End: 2018-07-17

## 2018-07-14 RX ORDER — LOSARTAN POTASSIUM 100 MG/1
100 TABLET, FILM COATED ORAL DAILY
Qty: 0 | Refills: 0 | Status: DISCONTINUED | OUTPATIENT
Start: 2018-07-14 | End: 2018-07-17

## 2018-07-14 RX ORDER — LOSARTAN POTASSIUM 100 MG/1
1 TABLET, FILM COATED ORAL
Qty: 0 | Refills: 0 | COMMUNITY

## 2018-07-14 RX ORDER — DEXTROSE 50 % IN WATER 50 %
25 SYRINGE (ML) INTRAVENOUS ONCE
Qty: 0 | Refills: 0 | Status: DISCONTINUED | OUTPATIENT
Start: 2018-07-14 | End: 2018-07-17

## 2018-07-14 RX ORDER — INSULIN LISPRO 100/ML
VIAL (ML) SUBCUTANEOUS
Qty: 0 | Refills: 0 | Status: DISCONTINUED | OUTPATIENT
Start: 2018-07-14 | End: 2018-07-17

## 2018-07-14 RX ORDER — DEXTROSE 50 % IN WATER 50 %
12.5 SYRINGE (ML) INTRAVENOUS ONCE
Qty: 0 | Refills: 0 | Status: DISCONTINUED | OUTPATIENT
Start: 2018-07-14 | End: 2018-07-17

## 2018-07-14 RX ORDER — GLUCAGON INJECTION, SOLUTION 0.5 MG/.1ML
1 INJECTION, SOLUTION SUBCUTANEOUS ONCE
Qty: 0 | Refills: 0 | Status: DISCONTINUED | OUTPATIENT
Start: 2018-07-14 | End: 2018-07-17

## 2018-07-14 RX ORDER — SODIUM CHLORIDE 9 MG/ML
1000 INJECTION, SOLUTION INTRAVENOUS
Qty: 0 | Refills: 0 | Status: DISCONTINUED | OUTPATIENT
Start: 2018-07-14 | End: 2018-07-17

## 2018-07-14 RX ORDER — CARVEDILOL PHOSPHATE 80 MG/1
12.5 CAPSULE, EXTENDED RELEASE ORAL DAILY
Qty: 0 | Refills: 0 | Status: DISCONTINUED | OUTPATIENT
Start: 2018-07-14 | End: 2018-07-16

## 2018-07-14 RX ORDER — DEXTROSE 50 % IN WATER 50 %
15 SYRINGE (ML) INTRAVENOUS ONCE
Qty: 0 | Refills: 0 | Status: DISCONTINUED | OUTPATIENT
Start: 2018-07-14 | End: 2018-07-17

## 2018-07-14 RX ORDER — NIFEDIPINE 30 MG
60 TABLET, EXTENDED RELEASE 24 HR ORAL DAILY
Qty: 0 | Refills: 0 | Status: DISCONTINUED | OUTPATIENT
Start: 2018-07-14 | End: 2018-07-17

## 2018-07-14 RX ORDER — HYDRALAZINE HCL 50 MG
25 TABLET ORAL EVERY 12 HOURS
Qty: 0 | Refills: 0 | Status: DISCONTINUED | OUTPATIENT
Start: 2018-07-14 | End: 2018-07-17

## 2018-07-14 RX ORDER — ATORVASTATIN CALCIUM 80 MG/1
40 TABLET, FILM COATED ORAL AT BEDTIME
Qty: 0 | Refills: 0 | Status: DISCONTINUED | OUTPATIENT
Start: 2018-07-14 | End: 2018-07-17

## 2018-07-14 RX ORDER — VANCOMYCIN HCL 1 G
1000 VIAL (EA) INTRAVENOUS ONCE
Qty: 0 | Refills: 0 | Status: COMPLETED | OUTPATIENT
Start: 2018-07-14 | End: 2018-07-14

## 2018-07-14 RX ORDER — SODIUM CHLORIDE 9 MG/ML
250 INJECTION INTRAMUSCULAR; INTRAVENOUS; SUBCUTANEOUS ONCE
Qty: 0 | Refills: 0 | Status: COMPLETED | OUTPATIENT
Start: 2018-07-14 | End: 2018-07-14

## 2018-07-14 RX ADMIN — ATORVASTATIN CALCIUM 40 MILLIGRAM(S): 80 TABLET, FILM COATED ORAL at 21:38

## 2018-07-14 RX ADMIN — Medication 200 MILLIGRAM(S): at 08:00

## 2018-07-14 RX ADMIN — CEFEPIME 100 MILLIGRAM(S): 1 INJECTION, POWDER, FOR SOLUTION INTRAMUSCULAR; INTRAVENOUS at 05:40

## 2018-07-14 RX ADMIN — SODIUM CHLORIDE 500 MILLILITER(S): 9 INJECTION INTRAMUSCULAR; INTRAVENOUS; SUBCUTANEOUS at 08:00

## 2018-07-14 RX ADMIN — HEPARIN SODIUM 5000 UNIT(S): 5000 INJECTION INTRAVENOUS; SUBCUTANEOUS at 21:38

## 2018-07-14 RX ADMIN — Medication 400 MILLIGRAM(S): at 06:45

## 2018-07-14 RX ADMIN — Medication 250 MILLIGRAM(S): at 09:00

## 2018-07-14 RX ADMIN — HEPARIN SODIUM 5000 UNIT(S): 5000 INJECTION INTRAVENOUS; SUBCUTANEOUS at 13:07

## 2018-07-14 RX ADMIN — Medication 25 MILLIGRAM(S): at 17:01

## 2018-07-14 NOTE — ED ADULT NURSE NOTE - ATTEMPT TO OOB
no 9.2    10.4  )-----------( 218      ( 11 May 2018 06:24 )             29.4     05-11    140  |  105  |  30.0<H>  ----------------------------<  106  3.6   |  17.0<L>  |  1.05    Ca    9.1      11 May 2018 06:24    TPro  7.3  /  Alb  4.1  /  TBili  <0.2<L>  /  DBili  x   /  AST  25  /  ALT  15  /  AlkPhos  80  05-10    LIVER FUNCTIONS - ( 10 May 2018 11:48 )  Alb: 4.1 g/dL / Pro: 7.3 g/dL / ALK PHOS: 80 U/L / ALT: 15 U/L / AST: 25 U/L / GGT: x           PT/INR - ( 11 May 2018 06:24 )   PT: 12.8 sec;   INR: 1.16 ratio           Urinalysis Basic - ( 10 May 2018 10:11 )    Color: Yellow / Appearance: Clear / S.015 / pH: x  Gluc: x / Ketone: Negative  / Bili: Negative / Urobili: Negative mg/dL   Blood: x / Protein: 30 mg/dL / Nitrite: Negative   Leuk Esterase: Small / RBC: x / WBC 15-20 /HPF   Sq Epi: x / Non Sq Epi: Few / Bacteria: Few

## 2018-07-14 NOTE — ED ADULT NURSE NOTE - CHIEF COMPLAINT QUOTE
Pt TAMIKA SWEET from Home, Wife called EMS, reports SOB, O2 sat. at 80% RA, placed on NRB, Noted Altered Mental Status, respond to verbal. Baseline AOX3, Dialysis Pt. Last treatment was Thursday. Left AV fistula with Bruit/thrill. Warm to touch

## 2018-07-14 NOTE — ED PROVIDER NOTE - OBJECTIVE STATEMENT
76 yo M PMHx ESRD on HD T/Th/Sa (received dialysis on Thursday), HTN, CAD s/p 1 stent, DM, Loop Recorder placed a year ago presents with SOB. Pt is accompanied by wife. Wife states he woke up in the middle of the night and was having shortness of breath. She also states he felt warm. He was discharged from the hospital on 7/8 for the same chief complaint. He denies CP/palpitations

## 2018-07-14 NOTE — ED ADULT NURSE NOTE - OBJECTIVE STATEMENT
pt a&ox2 (self and location) as per wife this is baseline, bedbound d/t weakness for several months, incontinent arrives to ED for episode of SOB. Pt's wife states patient had labored breathing. Pt sleepy, responds to verbal stimuli. Pt warm to touch, tachycardic, febrile, tachypneic. Septic work up initiated.  Pt's wife at bedside states patient recently admitted for SOB, unsure about any discharge diagnosis, denies patient has been started on any antibiotics. Pt has rhonchi, nonproductive cough. Pt denies discomfort/burning with urination. pt observed to have occasional moaning with respirations, when asked if he has pain responds yes and states jaw. No wound or deformity observed. Pt arrived with 22G to R hand, 20G to R upper arm placed in ED. Pt received 300ml NS on route. Pt also a dialysis patient, L AV fistula Treatments Tuesday Thursday Saturday, last treatment Thursday. Pending MD marques. Pt placed on cardiac monitor and sp02 for close monitoring. Safety and comfort maintained. MD made aware. pt a&ox2 (self and location) as per wife this is baseline, bedbound d/t weakness for several months, incontinent arrives to ED for episode of SOB. Pt's wife states patient had labored breathing. Pt sleepy, responds to verbal stimuli. Pt warm to touch, tachycardic, febrile, tachypneic. Septic work up initiated.  Pt's wife at bedside states patient recently admitted for SOB, unsure about any discharge diagnosis, denies patient has been started on any antibiotics. Pt has rhonchi, nonproductive cough. Pt denies discomfort/burning with urination. pt observed to have occasional moaning with respirations, when asked if he has pain responds yes and states jaw. No wound or deformity observed. Pt arrived with 22G to R hand, 20G to R upper arm placed in ED. Pt received 300ml NS on route. Pt also a dialysis patient, L AV fistula Treatments Tuesday Thursday Saturday, last treatment Thursday. Pending MD marques. Pt placed on cardiac monitor and sp02 for close monitoring. Safety and comfort maintained. Clean diaper and linen. MD made aware of status.

## 2018-07-14 NOTE — ED PROVIDER NOTE - PHYSICAL EXAMINATION
Gen: AAOx3  Head: NCAT  HEENT: EOMI, oral mucosa moist, normal conjunctiva  Lung: CTAB  CV: RRR, no murmurs, rubs or gallops  Abd: soft, NTND, no guarding  MSK: no visible deformities  Neuro: No focal sensory or motor deficits  Skin: Warm, well perfused, no rash  Psych: normal affect.   ~Dariela Arredondo M.D. Resident

## 2018-07-14 NOTE — H&P ADULT - PROBLEM SELECTOR PLAN 5
Electrolytes and volume status grossly acceptable. Nephrology recs reviewed. Patient to undergo HD today.

## 2018-07-14 NOTE — H&P ADULT - ASSESSMENT
77 M PMH ESRD on HD, T2DM on oral hypoglycemics, presents with c/o cough, shortness of breath, decreased responsiveness. Per ED charting, he was hypoxic to 80s in the field. VS in ED notable for rectal T 100.5. On exam, he is alert, oriented to self and place. No lower extremity edema. Labs without leukocytosis. RVP positive for parainfluenza. CXR with retrocardiac opacity.

## 2018-07-14 NOTE — H&P ADULT - PROBLEM SELECTOR PLAN 1
Patient presents with c/o cough, shortness of breath. Per ED charting, hypoxic to 80s when assessed by EMS. CXR with retrocardiac opacity and RVP positive for parainfluenza. Consistent with acute respiratory failure with hypoxia 2/2 parainfluenza infection.     At present, patient is off supplemental oxygen and maintaining oxygen saturation >97%. His breathing is non-labored and he is speaking in complete sentences.    Retrocardiac opacity on CXR could represent PNA vs infiltrate. Given overall hemodynamic stability, improved mental status, and lack of leukocytosis, will monitor off abx as parainfluenza is most likely cause of patient's complaints.     -monitor off oxygen and antibiotics

## 2018-07-14 NOTE — ED PROVIDER NOTE - PMH
CAD (coronary artery disease)  s/p 1 stent  Dementia    DM (diabetes mellitus)    ESRD (end stage renal disease)    Fall  s/p L.arm fracture  HLD (hyperlipidemia)    HTN (hypertension)    PVD (peripheral vascular disease)    Thrombocytopenia

## 2018-07-14 NOTE — ED PROVIDER NOTE - MEDICAL DECISION MAKING DETAILS
76 yo M PMHx ESRD on HD T/Th/Sa (received dialysis on Thursday), HTN, CAD s/p 1 stent, DM, Loop Recorder placed a year ago presents with SOB, febrile on triage vitals, will obtain CXR, UA, basic labs + cultures, abx, reevaluate

## 2018-07-14 NOTE — ED ADULT TRIAGE NOTE - CHIEF COMPLAINT QUOTE
Pt TAMIKA SWEET from [facility], Wife called EMS, reports SOB, O2 sat. at 80% RA, placed on NRB, Noted Altered Mental Status, respond to verbal. Baseline AOX3, Dialysis Pt. Last treatment was Thursday. Left AV fistula with Bruit/thrill. Warm to touch Pt TAMIKA SWEET from Home, Wife called EMS, reports SOB, O2 sat. at 80% RA, placed on NRB, Noted Altered Mental Status, respond to verbal. Baseline AOX3, Dialysis Pt. Last treatment was Thursday. Left AV fistula with Bruit/thrill. Warm to touch

## 2018-07-14 NOTE — H&P ADULT - PROBLEM SELECTOR PLAN 8
IMPROVE 2: HSQ for DVT ppx No reports of angina.     c/w home dose ASA 81mg, carvedilol, atorvastatin

## 2018-07-14 NOTE — H&P ADULT - PROBLEM SELECTOR PLAN 2
Wife states patient was "unable to wake up" to voice and touch this morning. Did not improve until arrived to ED. Concerning for toxic metabolic encephalopathy in setting of parainfluenza infection. At time of assessment, patient is alert and conversant. Encephalopathy is improving.

## 2018-07-14 NOTE — H&P ADULT - PROBLEM SELECTOR PLAN 3
Tachycardic, febrile, +parainfluenza on RVP, hypoxic respiratory failure; fulfills criteria for severe sepsis with respiratory failure 2/2 parainfluenza infection. Improving.    -monitor off oxygen

## 2018-07-14 NOTE — ED PROVIDER NOTE - ATTENDING CONTRIBUTION TO CARE
77M h/o ESRD on HD (last Thurs), HTN, CAD s/p stent, DM presents with SOB and fever. Symptoms started overnight, reports nonproductive cough.  Wife also notes increased confusion and lethargy.  Less talkative.  Was recently discharged for similar symptoms (but no fever. On exam chronically ill appearing, nad, lungs clear, rrr, abd soft, 2+ pulses, no edema, no rash, speech clear. CXR no infiltrate. + fever here. Given abx, cultures drawn. UA pending. Trop elevated but unchanged at 1hr, likely due to ESRD. plan for admission.

## 2018-07-14 NOTE — H&P ADULT - HISTORY OF PRESENT ILLNESS
HPI:    77 M PMH ESRD on HD, T2DM on oral hypoglycemics, presents with c/o cough. Per the patient's wife, the patient has had a cough productive of whitish sputum for the last 3 days. She also notes that he felt warm to the touch over the past few days. The patient has also been complaining of waking up short of breath at night, but not while seated upright during the day. The patient is mostly bed/wheelchair bound and is unable to comment on whether exertion worsens his dyspnea.     Per the patient's wife, the patient was also less responsive over night. She attempted to wake him by voice and touch but he was not waking up. The patient is alert and conversant during my interview.    Of note, the patient was discharged from the hospital about a week ago for shortness of breath which improved after a session of dialysis.     The patient has not received IV antibiotics in the last 90 days.    PAST MEDICAL & SURGICAL HISTORY:  Fall: s/p L.arm fracture  Dementia  Thrombocytopenia  PVD (peripheral vascular disease)  CAD (coronary artery disease): s/p 1 stent  HLD (hyperlipidemia)  HTN (hypertension)  DM (diabetes mellitus)  ESRD (end stage renal disease)  H/O foot surgery: sec to ankle fracture  S/P cataract extraction  S/P coronary artery stent placement  S/P cholecystectomy  AVF (arteriovenous fistula)      Review of Systems:   CONSTITUTIONAL: +subjective fever, no weight loss, or fatigue  EYES: No eye pain, visual disturbances, or discharge  ENMT:  No difficulty hearing, tinnitus, vertigo; No sinus or throat pain  NECK: No pain or stiffness  BREASTS: No pain, masses, or nipple discharge  RESPIRATORY: +cough, no wheezing, chills or hemoptysis; +shortness of breath  CARDIOVASCULAR: No chest pain, palpitations, dizziness, or leg swelling  GASTROINTESTINAL: No abdominal or epigastric pain. No nausea, vomiting, or hematemesis; No diarrhea or constipation. No melena or hematochezia.  GENITOURINARY: No dysuria, frequency, hematuria, or incontinence  NEUROLOGICAL: No headaches, memory loss, loss of strength, numbness, or tremors  SKIN: No itching, burning, rashes, or lesions   LYMPH NODES: No enlarged glands  ENDOCRINE: No heat or cold intolerance; No hair loss  MUSCULOSKELETAL: No joint pain or swelling; No muscle, back, or extremity pain  PSYCHIATRIC: No depression, anxiety, mood swings, or difficulty sleeping  HEME/LYMPH: No easy bruising, or bleeding gums  ALLERGY AND IMMUNOLOGIC: No hives or eczema    Allergies    No Known Allergies    Intolerances        Social History:   Lives with wife. Does not smoke or drink. No drug use.    FAMILY HISTORY:  No pertinent family history in first degree relatives      MEDICATIONS  (STANDING):  dextrose 5%. 1000 milliLiter(s) (50 mL/Hr) IV Continuous <Continuous>  dextrose 50% Injectable 12.5 Gram(s) IV Push once  dextrose 50% Injectable 25 Gram(s) IV Push once  dextrose 50% Injectable 25 Gram(s) IV Push once  heparin  Injectable 5000 Unit(s) SubCutaneous every 8 hours  insulin lispro (HumaLOG) corrective regimen sliding scale   SubCutaneous three times a day before meals    MEDICATIONS  (PRN):  dextrose 40% Gel 15 Gram(s) Oral once PRN Blood Glucose LESS THAN 70 milliGRAM(s)/deciliter  glucagon  Injectable 1 milliGRAM(s) IntraMuscular once PRN Glucose LESS THAN 70 milligrams/deciliter      T(C): 38.1 (18 @ 04:38), Max: 38.1 (18 @ 04:38)  HR: 63 (18 @ 09:29) (63 - 102)  BP: 147/47 (18 @ 09:29) (147/47 - 192/79)  RR: 19 (18 @ 09:29) (19 - 26)  SpO2: 100% (18 @ 09:29) (99% - 100%)    CAPILLARY BLOOD GLUCOSE      POCT Blood Glucose.: 236 mg/dL (2018 09:00)    I&O's Summary      PHYSICAL EXAM:  GENERAL: Thin man seated in stretcher, in NAD, pleasant  HEAD:  Atraumatic, Normocephalic, moist oral mucosa  EYES: EOMI, PERRLA, conjunctiva and sclera clear  NECK: Supple, No elevated JVD  CHEST/LUNG: Clear to auscultation bilaterally; No wheeze  HEART: Regular rate and rhythm; No murmurs, rubs, or gallops  ABDOMEN: Soft, Nontender, Nondistended; Bowel sounds present  EXTREMITIES:  2+ Peripheral Pulses, No clubbing, cyanosis, or edema; L arm AVF with thrill and bruit  PSYCH: Alert, oriented to self and place  NEUROLOGY: CN II-XII grossly intact, moving all extremities  SKIN: No rashes or lesions    LABS:                        8.8    5.65  )-----------( 159      ( 2018 06:00 )             28.7     07-14    138  |  93<L>  |  39<H>  ----------------------------<  167<H>  3.5   |  28  |  4.71<H>    Ca    8.0<L>      2018 04:38    TPro  7.2  /  Alb  3.6  /  TBili  0.4  /  DBili  x   /  AST  19  /  ALT  11  /  AlkPhos  34<L>  07-14          Urinalysis Basic - ( 2018 07:55 )    Color: YELLOW / Appearance: CLEAR / S.009 / pH: 8.5  Gluc: 50 / Ketone: NEGATIVE  / Bili: NEGATIVE / Urobili: NORMAL mg/dL   Blood: NEGATIVE / Protein: 500 mg/dL / Nitrite: NEGATIVE   Leuk Esterase: NEGATIVE / RBC: 0-2 / WBC 0-2     Rapid Respiratory Viral Panel (18 @ 09:27)    Adenovirus (RapRVP): NOT DETECTED    Influenza A (RapRVP): NOT DETECTED (any subtype)    Influenza AH1 2009 (RapRVP): NOT DETECTED    Influenza AH1 (RapRVP): NOT DETECTED    Influenza AH3 (RapRVP): NOT DETECTED    Influenza B (RapRVP): NOT DETECTED    Parainfluenza 1 (RapRVP): NOT DETECTED    Parainfluenza 2 (RapRVP): NOT DETECTED    Parainfluenza 3 (RapRVP): POSITIVE    Parainfluenza 4 (RapRVP): NOT DETECTED    Resp Syncytial Virus (RapRVP): NOT DETECTED    Bordetella pertussis (RapRVP): NOT DETECTED    Chlamydia pneumoniae (RapRVP): NOT DETECTED    Mycoplasma pneumoniae (RapRVP): NOT DETECTED This nucleic acid amplification assay was performed using  the [employer]. The following pathogens are tested  for: Adenovirus, Coronavirus 229E, Coronavirus HKU1,  Coronavirus NL63, Coronavirus OC43, Human Metapneumovirus  (HMPV), Rhinovirus/Enterovirus, Influenza A H1, Influenza A  H1 2009 (Pandemic H1 2009), Influenza A H3, Influenza A (Flu  A) subtype not identified, Influenza B, Parainfluenza Virus  (types 1, 2, 3, 4), Respiratory Syncytial Virus (RSV),  Bordetella pertussis, Chlamydophila pneumoniae, and  Mycoplasma pneumoniae. A negative FilmArray result does not  always exclude the possibility of viral or bacterial  infection. Laboratory results should always be interpreted  in the context of clinical findings.    Entero/Rhinovirus (RapRVP): NOT DETECTED    HKU1 Coronavirus (RapRVP): NOT DETECTED    NL63 Coronavirus (RapRVP): NOT DETECTED    229E Coronavirus (RapRVP): NOT DETECTED    OC43 Coronavirus (RapRVP): NOT DETECTED    hMPV (RapRVP): NOT DETECTED    Sq Epi: x / Non Sq Epi: x / Bacteria: x            RADIOLOGY & ADDITIONAL TESTS:    ECG Personally Reviewed -     Imaging Personally Reviewed: CXR - retrocardiac density, ddx includes infiltrate vs PNA    Consultant(s) Notes Reviewed:  Nephrology    Care Discussed with Consultants/Other Providers:

## 2018-07-14 NOTE — ED PROVIDER NOTE - NS ED ROS FT
GENERAL: +fevers, EYES: no change in vision, HEENT: no trouble swallowing or speaking, CARDIAC: no chest pain, PULMONARY: +cough, +SOB, GI: no abdominal pain, no nausea, no vomiting, no diarrhea or constipation, : No changes in urination, SKIN: no rashes, NEURO: no headache,  MSK: No joint pain ~Dariela Arredondo M.D. Resident

## 2018-07-14 NOTE — CONSULT NOTE ADULT - ATTENDING COMMENTS
[facility] NEPHROLOGY  Ihsan Saba M.D.  Eduardo Romano D.O.  Loni Ribeiro M.D.  Maris Ramirez, MSN, ANP-C    Telephone: [phone]  Facsimile: [phone]    [address] [place] [address]

## 2018-07-15 LAB
SPECIMEN SOURCE: SIGNIFICANT CHANGE UP

## 2018-07-15 RX ORDER — HYDRALAZINE HCL 50 MG
10 TABLET ORAL ONCE
Qty: 0 | Refills: 0 | Status: COMPLETED | OUTPATIENT
Start: 2018-07-15 | End: 2018-07-15

## 2018-07-15 RX ORDER — IPRATROPIUM/ALBUTEROL SULFATE 18-103MCG
3 AEROSOL WITH ADAPTER (GRAM) INHALATION EVERY 6 HOURS
Qty: 0 | Refills: 0 | Status: DISCONTINUED | OUTPATIENT
Start: 2018-07-15 | End: 2018-07-17

## 2018-07-15 RX ADMIN — PREGABALIN 1000 MICROGRAM(S): 225 CAPSULE ORAL at 11:29

## 2018-07-15 RX ADMIN — LOSARTAN POTASSIUM 100 MILLIGRAM(S): 100 TABLET, FILM COATED ORAL at 05:21

## 2018-07-15 RX ADMIN — Medication 25 MILLIGRAM(S): at 17:54

## 2018-07-15 RX ADMIN — CARVEDILOL PHOSPHATE 12.5 MILLIGRAM(S): 80 CAPSULE, EXTENDED RELEASE ORAL at 05:21

## 2018-07-15 RX ADMIN — Medication 20 MILLIGRAM(S): at 22:17

## 2018-07-15 RX ADMIN — ATORVASTATIN CALCIUM 40 MILLIGRAM(S): 80 TABLET, FILM COATED ORAL at 22:16

## 2018-07-15 RX ADMIN — Medication 3 MILLILITER(S): at 16:43

## 2018-07-15 RX ADMIN — HEPARIN SODIUM 5000 UNIT(S): 5000 INJECTION INTRAVENOUS; SUBCUTANEOUS at 05:21

## 2018-07-15 RX ADMIN — Medication 2: at 12:19

## 2018-07-15 RX ADMIN — Medication 10 MILLIGRAM(S): at 22:48

## 2018-07-15 RX ADMIN — Medication 81 MILLIGRAM(S): at 11:29

## 2018-07-15 RX ADMIN — Medication 20 MILLIGRAM(S): at 15:02

## 2018-07-15 RX ADMIN — HEPARIN SODIUM 5000 UNIT(S): 5000 INJECTION INTRAVENOUS; SUBCUTANEOUS at 22:16

## 2018-07-15 RX ADMIN — Medication 3 MILLILITER(S): at 21:18

## 2018-07-15 RX ADMIN — Medication 25 MILLIGRAM(S): at 05:21

## 2018-07-15 RX ADMIN — Medication 100 MILLIGRAM(S): at 15:02

## 2018-07-15 RX ADMIN — Medication 60 MILLIGRAM(S): at 05:21

## 2018-07-15 RX ADMIN — HEPARIN SODIUM 5000 UNIT(S): 5000 INJECTION INTRAVENOUS; SUBCUTANEOUS at 15:02

## 2018-07-15 NOTE — PROGRESS NOTE ADULT - SUBJECTIVE AND OBJECTIVE BOX
[facility] NEPHROLOGY- CONSULTATION NOTE    Patient is a 77y Male with ESRD on HD TTS, Dr. Romano, [facility], who was recently admitted to [facility] (1 week ago) for hypertensive emeregnecy with volume overload nad pleural effusions, who presents today with cough X 2 days and was found to have parainfluenza bonchitis.      Pt reports feeling much better today. SOB resolved.  HD yesterday well-tolerated.    PAST MEDICAL & SURGICAL HISTORY:  Fall: s/p L.arm fracture  Dementia  Thrombocytopenia  PVD (peripheral vascular disease)  CAD (coronary artery disease): s/p 1 stent  HLD (hyperlipidemia)  HTN (hypertension)  DM (diabetes mellitus)  ESRD (end stage renal disease)  H/O foot surgery: sec to ankle fracture  S/P cataract extraction  S/P coronary artery stent placement  S/P cholecystectomy  AVF (arteriovenous fistula)    No Known Allergies    Home Medications Reviewed  Hospital Medications:   MEDICATIONS  (STANDING):    SOCIAL HISTORY:  Denies ETOh,Smoking,   FAMILY HISTORY:  No pertinent family history in first degree relatives    REVIEW OF SYSTEMS:  CONSTITUTIONAL: + weakness, fevers or chills  EYES/ENT: No visual changes;  No vertigo or throat pain   NECK: No pain or stiffness  RESPIRATORY: +cough improving, nohemoptysis. No SOB.  CARDIOVASCULAR: No chest pain or palpitations.  GASTROINTESTINAL: No abdominal or epigastric pain. No nausea, vomiting, or hematemesis; No diarrhea or constipation. No melena or hematochezia.  GENITOURINARY: No dysuria, frequency, foamy urine, urinary urgency, incontinence or hematuria  NEUROLOGICAL: No numbness or weakness  SKIN: No itching, burning, rashes, or lesions   VASCULAR: No bilateral lower extremity edema.   All other review of systems is negative unless indicated above.    VITALS:  T(F): 100.5 (18 @ 04:38), Max: 100.5 (18 @ 04:38)  HR: 63 (18 @ 09:29)  BP: 147/47 (18 @ 09:29)  RR: 19 (18 @ 09:29)  SpO2: 100% (18 @ 09:29)  Wt(kg): --      PHYSICAL EXAM:  Constitutional: NAD  HEENT: anicteric sclera, oropharynx clear, MMM  Neck: No JVD  Respiratory: a few scattered crackles, but otherwise clear  Cardiovascular: S1, S2, RRR  Gastrointestinal: BS+, soft, NT/ND  Extremities: No cyanosis or clubbing. No peripheral edema  Neurological: A/O x 3, no focal deficits  Psychiatric: Normal mood, normal affect  : No CVA tenderness. No cordoba.   Skin: No rashes  Vascular Access: Left AVF benign, +thrill +bruit      LABS:      138  |  93<L>  |  39<H>  ----------------------------<  167<H>  3.5   |  28  |  4.71<H>    Ca    8.0<L>      2018 04:38    TPro  7.2  /  Alb  3.6  /  TBili  0.4  /  DBili      /  AST  19  /  ALT  11  /  AlkPhos  34<L>      Creatinine Trend: 4.71 <--                        8.8    5.65  )-----------( 159      ( 2018 06:00 )             28.7     Urine Studies:  Urinalysis Basic - ( 2018 07:55 )    Color: YELLOW / Appearance: CLEAR / S.009 / pH: 8.5  Gluc: 50 / Ketone: NEGATIVE  / Bili: NEGATIVE / Urobili: NORMAL mg/dL   Blood: NEGATIVE / Protein: 500 mg/dL / Nitrite: NEGATIVE   Leuk Esterase: NEGATIVE / RBC: 0-2 / WBC 0-2   Sq Epi:  / Non Sq Epi:  / Bacteria:

## 2018-07-15 NOTE — PROGRESS NOTE ADULT - ASSESSMENT
77 year-old man with ESRD on HD with SOB and fever found to have viral URI/bronchitis (parainfluenza 3).

## 2018-07-15 NOTE — PROGRESS NOTE ADULT - SUBJECTIVE AND OBJECTIVE BOX
Patient is a 77y old  Male who presents with a chief complaint of Shortness of breath, cough (2018 11:46)      INTERVAL HPI/OVERNIGHT EVENTS:  T(C): 36.8 (07-15-18 @ 14:43), Max: 36.8 (07-15-18 @ 14:43)  HR: 66 (07-15-18 @ 16:45) (64 - 71)  BP: 155/53 (07-15-18 @ 14:43) (139/55 - 204/83)  RR: 18 (07-15-18 @ 14:43) (16 - 20)  SpO2: 98% (07-15-18 @ 16:45) (98% - 100%)  Wt(kg): --  I&O's Summary    2018 07:01  -  15 Jul 2018 07:00  --------------------------------------------------------  IN: 400 mL / OUT: 2400 mL / NET: -2000 mL        LABS:                        8.8    5.65  )-----------( 159      ( 2018 06:00 )             28.7     07-14    138  |  93<L>  |  39<H>  ----------------------------<  167<H>  3.5   |  28  |  4.71<H>    Ca    8.0<L>      2018 04:38    TPro  7.2  /  Alb  3.6  /  TBili  0.4  /  DBili  x   /  AST  19  /  ALT  11  /  AlkPhos  34<L>  07      Urinalysis Basic - ( 2018 07:55 )    Color: YELLOW / Appearance: CLEAR / S.009 / pH: 8.5  Gluc: 50 / Ketone: NEGATIVE  / Bili: NEGATIVE / Urobili: NORMAL mg/dL   Blood: NEGATIVE / Protein: 500 mg/dL / Nitrite: NEGATIVE   Leuk Esterase: NEGATIVE / RBC: 0-2 / WBC 0-2   Sq Epi: x / Non Sq Epi: x / Bacteria: x      CAPILLARY BLOOD GLUCOSE      POCT Blood Glucose.: 122 mg/dL (15 Jul 2018 17:17)  POCT Blood Glucose.: 196 mg/dL (15 Jul 2018 11:59)  POCT Blood Glucose.: 106 mg/dL (15 Jul 2018 08:46)  POCT Blood Glucose.: 120 mg/dL (2018 22:32)        Urinalysis Basic - ( 2018 07:55 )    Color: YELLOW / Appearance: CLEAR / S.009 / pH: 8.5  Gluc: 50 / Ketone: NEGATIVE  / Bili: NEGATIVE / Urobili: NORMAL mg/dL   Blood: NEGATIVE / Protein: 500 mg/dL / Nitrite: NEGATIVE   Leuk Esterase: NEGATIVE / RBC: 0-2 / WBC 0-2   Sq Epi: x / Non Sq Epi: x / Bacteria: x        MEDICATIONS  (STANDING):  ALBUTerol/ipratropium for Nebulization 3 milliLiter(s) Nebulizer every 6 hours  aspirin enteric coated 81 milliGRAM(s) Oral daily  atorvastatin 40 milliGRAM(s) Oral at bedtime  carvedilol 12.5 milliGRAM(s) Oral daily  cyanocobalamin 1000 MICROGram(s) Oral daily  dextrose 5%. 1000 milliLiter(s) (50 mL/Hr) IV Continuous <Continuous>  dextrose 50% Injectable 12.5 Gram(s) IV Push once  dextrose 50% Injectable 25 Gram(s) IV Push once  dextrose 50% Injectable 25 Gram(s) IV Push once  heparin  Injectable 5000 Unit(s) SubCutaneous every 8 hours  hydrALAZINE 25 milliGRAM(s) Oral every 12 hours  insulin lispro (HumaLOG) corrective regimen sliding scale   SubCutaneous three times a day before meals  losartan 100 milliGRAM(s) Oral daily  methylPREDNISolone sodium succinate Injectable 20 milliGRAM(s) IV Push every 8 hours  NIFEdipine XL 60 milliGRAM(s) Oral daily    MEDICATIONS  (PRN):  aluminum hydroxide/magnesium hydroxide/simethicone Suspension 30 milliLiter(s) Oral once PRN Dyspepsia  benzonatate 100 milliGRAM(s) Oral three times a day PRN Cough  dextrose 40% Gel 15 Gram(s) Oral once PRN Blood Glucose LESS THAN 70 milliGRAM(s)/deciliter  glucagon  Injectable 1 milliGRAM(s) IntraMuscular once PRN Glucose LESS THAN 70 milligrams/deciliter          PHYSICAL EXAM:  GENERAL: NAD, well-groomed, well-developed  HEAD:  Atraumatic, Normocephalic  CHEST/LUNG: Clear to percussion bilaterally; No rales, rhonchi, wheezing, or rubs  HEART: Regular rate and rhythm; No murmurs, rubs, or gallops  ABDOMEN: Soft, Nontender, Nondistended; Bowel sounds present  EXTREMITIES:  2+ Peripheral Pulses, No clubbing, cyanosis, or edema  LYMPH: No lymphadenopathy noted  SKIN: No rashes or lesions    Care Discussed with Consultants/Other Providers [ ] YES  [ ] NO

## 2018-07-15 NOTE — CONSULT NOTE ADULT - SUBJECTIVE AND OBJECTIVE BOX
[facility] NEPHROLOGY- CONSULTATION NOTE    Patient is a 77y Male with ESRD on HD TTS, Dr. Romano, [facility], who was recently admitted to [facility] (1 week ago) for hypertensive emeregnecy with volume overload nad pleural effusions, who presents today with cough X 2 days following dialysis treatment on .  Pt's wife reports center was cold, pt c/o cold since HD Rx and today cough worsened so wife brought pt to hospital.  Pt does have SOB, but not much worse than usual.  SOB was worse overnight than it is now.    PAST MEDICAL & SURGICAL HISTORY:  Fall: s/p L.arm fracture  Dementia  Thrombocytopenia  PVD (peripheral vascular disease)  CAD (coronary artery disease): s/p 1 stent  HLD (hyperlipidemia)  HTN (hypertension)  DM (diabetes mellitus)  ESRD (end stage renal disease)  H/O foot surgery: sec to ankle fracture  S/P cataract extraction  S/P coronary artery stent placement  S/P cholecystectomy  AVF (arteriovenous fistula)    No Known Allergies    Home Medications Reviewed  Hospital Medications:   MEDICATIONS  (STANDING):    SOCIAL HISTORY:  Denies ETOh,Smoking,   FAMILY HISTORY:  No pertinent family history in first degree relatives    REVIEW OF SYSTEMS:  CONSTITUTIONAL: + weakness, fevers or chills  EYES/ENT: No visual changes;  No vertigo or throat pain   NECK: No pain or stiffness  RESPIRATORY: +cough, nohemoptysis; + mild shortness of breath  CARDIOVASCULAR: No chest pain or palpitations.  GASTROINTESTINAL: No abdominal or epigastric pain. No nausea, vomiting, or hematemesis; No diarrhea or constipation. No melena or hematochezia.  GENITOURINARY: No dysuria, frequency, foamy urine, urinary urgency, incontinence or hematuria  NEUROLOGICAL: No numbness or weakness  SKIN: No itching, burning, rashes, or lesions   VASCULAR: No bilateral lower extremity edema.   All other review of systems is negative unless indicated above.    VITALS:  T(F): 100.5 (18 @ 04:38), Max: 100.5 (18 @ 04:38)  HR: 63 (18 @ 09:29)  BP: 147/47 (18 @ 09:29)  RR: 19 (18 @ 09:29)  SpO2: 100% (18 @ 09:29)  Wt(kg): --      PHYSICAL EXAM:  Constitutional: NAD  HEENT: anicteric sclera, oropharynx clear, MMM  Neck: No JVD  Respiratory: a few scattered crackles, but otherwise clear  Cardiovascular: S1, S2, RRR  Gastrointestinal: BS+, soft, NT/ND  Extremities: No cyanosis or clubbing. No peripheral edema  Neurological: A/O x 3, no focal deficits  Psychiatric: Normal mood, normal affect  : No CVA tenderness. No cordoba.   Skin: No rashes  Vascular Access: Left AVF benign, +thrill +bruit    LABS:      138  |  93<L>  |  39<H>  ----------------------------<  167<H>  3.5   |  28  |  4.71<H>    Ca    8.0<L>      2018 04:38    TPro  7.2  /  Alb  3.6  /  TBili  0.4  /  DBili      /  AST  19  /  ALT  11  /  AlkPhos  34<L>      Creatinine Trend: 4.71 <--, 3.76 <--                        8.8    5.65  )-----------( 159      ( 2018 06:00 )             28.7     Urine Studies:  Urinalysis Basic - ( 2018 07:55 )    Color: YELLOW / Appearance: CLEAR / S.009 / pH: 8.5  Gluc: 50 / Ketone: NEGATIVE  / Bili: NEGATIVE / Urobili: NORMAL mg/dL   Blood: NEGATIVE / Protein: 500 mg/dL / Nitrite: NEGATIVE   Leuk Esterase: NEGATIVE / RBC: 0-2 / WBC 0-2   Sq Epi:  / Non Sq Epi:  / Bacteria:         Rapid Respiratory Viral Panel (18 @ 09:27)    Adenovirus (RapRVP): NOT DETECTED    Influenza A (RapRVP): NOT DETECTED (any subtype)    Influenza AH1 2009 (RapRVP): NOT DETECTED    Influenza AH1 (RapRVP): NOT DETECTED    Influenza AH3 (RapRVP): NOT DETECTED    Influenza B (RapRVP): NOT DETECTED    Parainfluenza 1 (RapRVP): NOT DETECTED    Parainfluenza 2 (RapRVP): NOT DETECTED    Parainfluenza 3 (RapRVP): POSITIVE    Parainfluenza 4 (RapRVP): NOT DETECTED    Resp Syncytial Virus (RapRVP): NOT DETECTED    Bordetella pertussis (RapRVP): NOT DETECTED    Chlamydia pneumoniae (RapRVP): NOT DETECTED    Mycoplasma pneumoniae (RapRVP): NOT DETECTED This nucleic acid amplification assay was performed using  the "[employer] FilmArray. The following pathogens are tested  for: Adenovirus, Coronavirus 229E, Coronavirus HKU1,  Coronavirus NL63, Coronavirus OC43, Human Metapneumovirus  (HMPV), Rhinovirus/Enterovirus, Influenza A H1, Influenza A  H1 2009 (Pandemic H1 2009), Influenza A H3, Influenza A (Flu  A) subtype not identified, Influenza B, Parainfluenza Virus  (types 1, 2, 3, 4), Respiratory Syncytial Virus (RSV),  Bordetella pertussis, Chlamydophila pneumoniae, and  Mycoplasma pneumoniae. A negative FilmArray result does not  always exclude the possibility of viral or bacterial  infection. Laboratory results should always be interpreted  in the context of clinical findings.    Entero/Rhinovirus (RapRVP): NOT DETECTED    HKU1 Coronavirus (RapRVP): NOT DETECTED    NL63 Coronavirus (RapRVP): NOT DETECTED    229E Coronavirus (RapRVP): NOT DETECTED    OC43 Coronavirus (RapRVP): NOT DETECTED    hMPV (RapRVP): NOT DETECTED        RADIOLOGY & ADDITIONAL STUDIES:        < from: Xray Chest 1 View- PORTABLE-Urgent (18 @ 05:27) >    EXAM:  XR CHEST PORTABLE URGENT 1V        PROCEDURE DATE:  2018         INTERPRETATION:  CLINICAL INFORMATION: Fever.    EXAM: Portable AP radiograph of the chest. Comparison is made tochest   x-ray of 2018.    IMPRESSION:    Redemonstrated slightly blunted left CP angle compatible with persistent   small left pleural reaction. Sharp appearing right CP angle. Arpan    Increased left retrocardiac density obscuring the left hemidiaphragm   raises suspicion for underlying airspace consolidation including possible   infiltrate/pneumonia in the proper clinical context. Clear remaining   visualized lungs. No pneumothorax.    Stable cardiac and mediastinal silhouettes.     Trachea midline.    Generalized osteopenia and mild spinal degenerative changes again noted.     Redemonstrated multiple overlapping metallic mesh vascular stents   traversing left brachiocephalic, subclavian, axillary, and medial   brachial regions.    Implanted loop recorder again seen over lower left hemithorax.              ANGEL OHNGEMACH M.D., RADIOLOGY RESIDENT  This document has been electronically signed.  MIRTA LITTLE M.D., ATTENDING RADIOLOGIST  This document has been electronically signed. 2018  9:39AM                  < end of copied text >
Patient is a 77y old  Male who presents with a chief complaint of Shortness of breath, cough (2018 11:46)      HPI:  HPI:    77 M PMH ESRD on HD, T2DM on oral hypoglycemics, presents with c/o cough. Per the patient's wife, the patient has had a cough productive of whitish sputum for the last 3 days. She also notes that he felt warm to the touch over the past few days. The patient has also been complaining of waking up short of breath at night, but not while seated upright during the day. The patient is mostly bed/wheelchair bound and is unable to comment on whether exertion worsens his dyspnea.     Per the patient's wife, the patient was also less responsive over night. She attempted to wake him by voice and touch but he was not waking up. The patient is alert and conversant during my interview.    Of note, the patient was discharged from the hospital about a week ago for shortness of breath which improved after a session of dialysis.     The patient has not received IV antibiotics in the last 90 days.   per wife , he isXbed bound and does not walk: He has no underlying pulmonary disease but has been coughing with yellow phlegm as well as wheezing aLOT:     ?FOLLOWING PRESENT  [ X] Hx of PE/DVT, [X ] Hx COPD, [X ] Hx of Asthma, X[ ] Hx of Hospitalization, [ X]  Hx of BiPAP/CPAP use, [ X] Hx of RUMA    Allergies    No Known Allergies    Intolerances        PAST MEDICAL & SURGICAL HISTORY:  Fall: s/p L.arm fracture  Dementia  Thrombocytopenia  PVD (peripheral vascular disease)  CAD (coronary artery disease): s/p 1 stent  HLD (hyperlipidemia)  HTN (hypertension)  DM (diabetes mellitus)  ESRD (end stage renal disease)  H/O foot surgery: sec to ankle fracture  S/P cataract extraction  S/P coronary artery stent placement  S/P cholecystectomy  AVF (arteriovenous fistula)      FAMILY HISTORY:  No pertinent family history in first degree relatives      Social History: [ X ] TOBACCO                  [X  ] ETOH                                 [ X ] IVDA/DRUGS    REVIEW OF SYSTEMS      General:	    Skin/Breast:  	  Ophthalmologic:  	  ENMT:	    Respiratory and Thorax: COUGH, WHEEZING,   	  Cardiovascular:	    Gastrointestinal:	    Genitourinary:	    Musculoskeletal:	    Neurological:	    Psychiatric:	    Hematology/Lymphatics:	    Endocrine:	    Allergic/Immunologic:	    MEDICATIONS  (STANDING):  aspirin enteric coated 81 milliGRAM(s) Oral daily  atorvastatin 40 milliGRAM(s) Oral at bedtime  carvedilol 12.5 milliGRAM(s) Oral daily  cyanocobalamin 1000 MICROGram(s) Oral daily  dextrose 5%. 1000 milliLiter(s) (50 mL/Hr) IV Continuous <Continuous>  dextrose 50% Injectable 12.5 Gram(s) IV Push once  dextrose 50% Injectable 25 Gram(s) IV Push once  dextrose 50% Injectable 25 Gram(s) IV Push once  heparin  Injectable 5000 Unit(s) SubCutaneous every 8 hours  hydrALAZINE 25 milliGRAM(s) Oral every 12 hours  insulin lispro (HumaLOG) corrective regimen sliding scale   SubCutaneous three times a day before meals  losartan 100 milliGRAM(s) Oral daily  NIFEdipine XL 60 milliGRAM(s) Oral daily    MEDICATIONS  (PRN):  dextrose 40% Gel 15 Gram(s) Oral once PRN Blood Glucose LESS THAN 70 milliGRAM(s)/deciliter  glucagon  Injectable 1 milliGRAM(s) IntraMuscular once PRN Glucose LESS THAN 70 milligrams/deciliter       Vital Signs Last 24 Hrs  T(C): 36.7 (15 Jul 2018 05:18), Max: 36.7 (15 Jul 2018 05:18)  T(F): 98 (15 Jul 2018 05:18), Max: 98 (15 Jul 2018 05:18)  HR: 68 (15 Jul 2018 05:18) (63 - 71)  BP: 156/64 (15 Jul 2018 05:18) (139/55 - 204/83)  BP(mean): --  RR: 18 (15 Jul 2018 05:18) (16 - 23)  SpO2: 100% (15 Jul 2018 05:18) (99% - 100%)        I&O's Summary    2018 07:01  -  15 Jul 2018 07:00  --------------------------------------------------------  IN: 400 mL / OUT: 2400 mL / NET: -2000 mL        Physical Exam:   GENERAL: NAD, well-groomed, well-developed  HEENT: ROBERT/   Atraumatic, Normocephalic  ENMT: No tonsillar erythema, exudates, or enlargement; Moist mucous membranes, Good dentition, No lesions  NECK: Supple, No JVD, Normal thyroid  CHEST/LUNG: WHEEZING++  CVS: Regular rate and rhythm; No murmurs, rubs, or gallops  GI: : Soft, Nontender, Nondistended; Bowel sounds present  NERVOUS SYSTEM:  Alert & Oriented X3  EXTREMITIES:   No clubbing, cyanosis, or edema  LYMPH: No lymphadenopathy noted  SKIN: No rashes or lesions  ENDOCRINOLOGY: No Thyromegaly  PSYCH: Appropriate    Labs:  6.0<48<4>>41<<7.425>>6.0<<3><<4><<5<<419>>                            8.8    5.65  )-----------( 159      ( 2018 06:00 )             28.7     07-14    138  |  93<L>  |  39<H>  ----------------------------<  167<H>  3.5   |  28  |  4.71<H>    Ca    8.0<L>      2018 04:38    TPro  7.2  /  Alb  3.6  /  TBili  0.4  /  DBili  x   /  AST  19  /  ALT  11  /  AlkPhos  34<L>  07-14    CAPILLARY BLOOD GLUCOSE      POCT Blood Glucose.: 196 mg/dL (15 Jul 2018 11:59)  POCT Blood Glucose.: 106 mg/dL (15 Jul 2018 08:46)  POCT Blood Glucose.: 120 mg/dL (2018 22:32)  POCT Blood Glucose.: 115 mg/dL (2018 17:59)    LIVER FUNCTIONS - ( 2018 04:38 )  Alb: 3.6 g/dL / Pro: 7.2 g/dL / ALK PHOS: 34 u/L / ALT: 11 u/L / AST: 19 u/L / GGT: x             Urinalysis Basic - ( 2018 07:55 )    Color: YELLOW / Appearance: CLEAR / S.009 / pH: 8.5  Gluc: 50 / Ketone: NEGATIVE  / Bili: NEGATIVE / Urobili: NORMAL mg/dL   Blood: NEGATIVE / Protein: 500 mg/dL / Nitrite: NEGATIVE   Leuk Esterase: NEGATIVE / RBC: 0-2 / WBC 0-2   Sq Epi: x / Non Sq Epi: x / Bacteria: x      D DImer      Studies  Chest X-RAY  CT SCAN Chest   CT Abdomen  Venous Dopplers: LE:   Others      < from: Xray Chest 1 View- PORTABLE-Urgent (18 @ 05:27) >    EXAM:  XR CHEST PORTABLE URGENT 1V        PROCEDURE DATE:  2018         INTERPRETATION:  CLINICAL INFORMATION: Fever.    EXAM: Portable AP radiograph of the chest. Comparison is made tochest   x-ray of 2018.    IMPRESSION:    Redemonstrated slightly blunted left CP angle compatible with persistent   small left pleural reaction. Sharp appearing right CP angle. Arpan    Increased left retrocardiac density obscuring the left hemidiaphragm   raises suspicion for underlying airspace consolidation including possible   infiltrate/pneumonia in the proper clinical context. Clear remaining   visualized lungs. No pneumothorax.    Stable cardiac and mediastinal silhouettes.     Trachea midline.    Generalized osteopenia and mild spinal degenerative changes again noted.     Redemonstrated multiple overlapping metallic mesh vascular stents   traversing left brachiocephalic, subclavian, axillary, and medial   brachial regions.    Implanted loop recorder again seen over lower left hemithorax.              ANGEL SANCHEZ M.D., RADIOLOGY RESIDENT  This document has been electronically signed.  MIRTA LITTLE M.D., ATTENDING RADIOLOGIST  This document has been electronically signed. 2018  9:39AM        < end of copied text >

## 2018-07-15 NOTE — PROGRESS NOTE ADULT - SUBJECTIVE AND OBJECTIVE BOX
CC: + cough    TELEMETRY:     PHYSICAL EXAM:    T(C): 36.7 (07-15-18 @ 05:18), Max: 36.7 (07-15-18 @ 05:18)  HR: 68 (07-15-18 @ 05:18) (63 - 71)  BP: 156/64 (07-15-18 @ 05:18) (139/55 - 204/83)  RR: 18 (07-15-18 @ 05:18) (16 - 23)  SpO2: 100% (07-15-18 @ 05:18) (99% - 100%)  Wt(kg): --  I&O's Summary    14 Jul 2018 07:01  -  15 Jul 2018 07:00  --------------------------------------------------------  IN: 400 mL / OUT: 2400 mL / NET: -2000 mL        Appearance: Normal	  Cardiovascular: Normal S1 S2,RRR, No JVD, No murmurs  Respiratory: Lungs clear to auscultation	  Gastrointestinal:  Soft, Non-tender, + BS	  Extremities: Normal range of motion, No clubbing, cyanosis or edema  Vascular: Peripheral pulses palpable 2+ bilaterally     LABS:	 	                          8.8    5.65  )-----------( 159      ( 14 Jul 2018 06:00 )             28.7     07-14    138  |  93<L>  |  39<H>  ----------------------------<  167<H>  3.5   |  28  |  4.71<H>    Ca    8.0<L>      14 Jul 2018 04:38    TPro  7.2  /  Alb  3.6  /  TBili  0.4  /  DBili  x   /  AST  19  /  ALT  11  /  AlkPhos  34<L>  07-14          CARDIAC MARKERS:

## 2018-07-15 NOTE — PROGRESS NOTE ADULT - ASSESSMENT
Problem/Plan - 1:  ·  Problem: Shortness of breath.  Plan: telemonitor   Continue current medications.   check echo  cards fu  Ischemia lorenzana as per cards    Problem/Plan - 2:  ·  Problem: ESRD (end stage renal disease).  Plan: monitor cr  renal fu    Problem/Plan - 3:  ·  Problem: CAD (coronary artery disease).  Plan: Continue ASA, Lipitor.     Problem/Plan - 4:  ·  Problem: HLD (hyperlipidemia).  Plan: Continue Lipitor.     Problem/Plan - 5:  ·  Problem: DM (diabetes mellitus).  Plan: Continue Insulin Sliding Scale.   Monitor Fingersticks.

## 2018-07-15 NOTE — CONSULT NOTE ADULT - PROBLEM SELECTOR PROBLEM 5
Essential hypertension
Coronary artery disease involving native heart, angina presence unspecified, unspecified vessel or lesion type

## 2018-07-15 NOTE — PROGRESS NOTE ADULT - ASSESSMENT
· Assessment		  77 M PMH ESRD on HD, T2DM on oral hypoglycemics, presents with c/o cough, shortness of breath, decreased responsiveness. Per ED charting, he was hypoxic to 80s in the field. VS in ED notable for rectal T 100.5.       1. Acute respiratory failure with hypoxia+ parainfluenzae  -cont supportive care  -no CHF    2. Encephalopathy acute.   -improving    3.ESRD on hemodialysis.  \  -HD per renal      4. Essential hypertension.  -cont outpt meds

## 2018-07-15 NOTE — CONSULT NOTE ADULT - ASSESSMENT
77 year-old man with ESRD on HD with SOB and fever found to have viral URI/bronchitis (parainfluenza 3).
77 M PMH ESRD on HD, T2DM on oral hypoglycemics, presents with c/o cough, shortness of breath, decreased responsiveness. Per ED charting, he was hypoxic to 80s in the field. VS in ED notable for rectal T 100.5. On exam, he is alert, oriented to self and place. No lower extremity edema. Labs without leukocytosis. RVP positive for parainfluenza. CXR with retrocardiac opacity.

## 2018-07-15 NOTE — CONSULT NOTE ADULT - PROBLEM SELECTOR RECOMMENDATION 4
monitor blood glucose on steroids now:
Viral Bronchitis.  Supportive Care.  Respiratory precautions.

## 2018-07-15 NOTE — CONSULT NOTE ADULT - PROBLEM SELECTOR RECOMMENDATION 9
seems to be secondary to parainfluenza virus infection with bronchospasm: start steroids: no fever as well as no WBC count: would observe off antibiotics: I don't see pneumonia on the chest radiograph:
Continue with maintenance hemodialysis treatment. Monitor BMP.

## 2018-07-16 ENCOUNTER — APPOINTMENT (OUTPATIENT)
Dept: ELECTROPHYSIOLOGY | Facility: CLINIC | Age: 78
End: 2018-07-16

## 2018-07-16 LAB
ALBUMIN SERPL ELPH-MCNC: 3.3 G/DL — SIGNIFICANT CHANGE UP (ref 3.3–5)
ALP SERPL-CCNC: 33 U/L — LOW (ref 40–120)
ALT FLD-CCNC: 6 U/L — SIGNIFICANT CHANGE UP (ref 4–41)
AST SERPL-CCNC: 16 U/L — SIGNIFICANT CHANGE UP (ref 4–40)
BACTERIA UR CULT: SIGNIFICANT CHANGE UP
BASOPHILS # BLD AUTO: 0 K/UL — SIGNIFICANT CHANGE UP (ref 0–0.2)
BASOPHILS NFR BLD AUTO: 0 % — SIGNIFICANT CHANGE UP (ref 0–2)
BILIRUB SERPL-MCNC: 0.4 MG/DL — SIGNIFICANT CHANGE UP (ref 0.2–1.2)
BUN SERPL-MCNC: 34 MG/DL — HIGH (ref 7–23)
CALCIUM SERPL-MCNC: 7.9 MG/DL — LOW (ref 8.4–10.5)
CHLORIDE SERPL-SCNC: 89 MMOL/L — LOW (ref 98–107)
CO2 SERPL-SCNC: 22 MMOL/L — SIGNIFICANT CHANGE UP (ref 22–31)
CREAT SERPL-MCNC: 4.73 MG/DL — HIGH (ref 0.5–1.3)
EOSINOPHIL # BLD AUTO: 0 K/UL — SIGNIFICANT CHANGE UP (ref 0–0.5)
EOSINOPHIL NFR BLD AUTO: 0 % — SIGNIFICANT CHANGE UP (ref 0–6)
GLUCOSE SERPL-MCNC: 189 MG/DL — HIGH (ref 70–99)
HCT VFR BLD CALC: 31.1 % — LOW (ref 39–50)
HGB BLD-MCNC: 9.8 G/DL — LOW (ref 13–17)
IMM GRANULOCYTES # BLD AUTO: 0.02 # — SIGNIFICANT CHANGE UP
IMM GRANULOCYTES NFR BLD AUTO: 0.4 % — SIGNIFICANT CHANGE UP (ref 0–1.5)
LYMPHOCYTES # BLD AUTO: 1.01 K/UL — SIGNIFICANT CHANGE UP (ref 1–3.3)
LYMPHOCYTES # BLD AUTO: 19.5 % — SIGNIFICANT CHANGE UP (ref 13–44)
MCHC RBC-ENTMCNC: 27.6 PG — SIGNIFICANT CHANGE UP (ref 27–34)
MCHC RBC-ENTMCNC: 31.5 % — LOW (ref 32–36)
MCV RBC AUTO: 87.6 FL — SIGNIFICANT CHANGE UP (ref 80–100)
MONOCYTES # BLD AUTO: 0.18 K/UL — SIGNIFICANT CHANGE UP (ref 0–0.9)
MONOCYTES NFR BLD AUTO: 3.5 % — SIGNIFICANT CHANGE UP (ref 2–14)
NEUTROPHILS # BLD AUTO: 3.97 K/UL — SIGNIFICANT CHANGE UP (ref 1.8–7.4)
NEUTROPHILS NFR BLD AUTO: 76.6 % — SIGNIFICANT CHANGE UP (ref 43–77)
NRBC # FLD: 0 — SIGNIFICANT CHANGE UP
PLATELET # BLD AUTO: 182 K/UL — SIGNIFICANT CHANGE UP (ref 150–400)
PMV BLD: 10.4 FL — SIGNIFICANT CHANGE UP (ref 7–13)
POTASSIUM SERPL-MCNC: 5 MMOL/L — SIGNIFICANT CHANGE UP (ref 3.5–5.3)
POTASSIUM SERPL-SCNC: 5 MMOL/L — SIGNIFICANT CHANGE UP (ref 3.5–5.3)
PROT SERPL-MCNC: 6.8 G/DL — SIGNIFICANT CHANGE UP (ref 6–8.3)
RBC # BLD: 3.55 M/UL — LOW (ref 4.2–5.8)
RBC # FLD: 14.9 % — HIGH (ref 10.3–14.5)
SODIUM SERPL-SCNC: 131 MMOL/L — LOW (ref 135–145)
WBC # BLD: 5.18 K/UL — SIGNIFICANT CHANGE UP (ref 3.8–10.5)
WBC # FLD AUTO: 5.18 K/UL — SIGNIFICANT CHANGE UP (ref 3.8–10.5)

## 2018-07-16 RX ORDER — ERYTHROPOIETIN 10000 [IU]/ML
4000 INJECTION, SOLUTION INTRAVENOUS; SUBCUTANEOUS
Qty: 0 | Refills: 0 | Status: DISCONTINUED | OUTPATIENT
Start: 2018-07-16 | End: 2018-07-17

## 2018-07-16 RX ORDER — CARVEDILOL PHOSPHATE 80 MG/1
25 CAPSULE, EXTENDED RELEASE ORAL EVERY 12 HOURS
Qty: 0 | Refills: 0 | Status: DISCONTINUED | OUTPATIENT
Start: 2018-07-16 | End: 2018-07-17

## 2018-07-16 RX ADMIN — Medication 20 MILLIGRAM(S): at 05:35

## 2018-07-16 RX ADMIN — HEPARIN SODIUM 5000 UNIT(S): 5000 INJECTION INTRAVENOUS; SUBCUTANEOUS at 21:02

## 2018-07-16 RX ADMIN — LOSARTAN POTASSIUM 100 MILLIGRAM(S): 100 TABLET, FILM COATED ORAL at 05:35

## 2018-07-16 RX ADMIN — Medication 20 MILLIGRAM(S): at 14:34

## 2018-07-16 RX ADMIN — HEPARIN SODIUM 5000 UNIT(S): 5000 INJECTION INTRAVENOUS; SUBCUTANEOUS at 05:35

## 2018-07-16 RX ADMIN — Medication 81 MILLIGRAM(S): at 12:40

## 2018-07-16 RX ADMIN — Medication 60 MILLIGRAM(S): at 05:35

## 2018-07-16 RX ADMIN — Medication 3 MILLILITER(S): at 09:12

## 2018-07-16 RX ADMIN — ATORVASTATIN CALCIUM 40 MILLIGRAM(S): 80 TABLET, FILM COATED ORAL at 21:02

## 2018-07-16 RX ADMIN — Medication 4: at 09:25

## 2018-07-16 RX ADMIN — Medication 8: at 12:52

## 2018-07-16 RX ADMIN — CARVEDILOL PHOSPHATE 25 MILLIGRAM(S): 80 CAPSULE, EXTENDED RELEASE ORAL at 17:46

## 2018-07-16 RX ADMIN — HEPARIN SODIUM 5000 UNIT(S): 5000 INJECTION INTRAVENOUS; SUBCUTANEOUS at 14:34

## 2018-07-16 RX ADMIN — Medication 25 MILLIGRAM(S): at 05:35

## 2018-07-16 RX ADMIN — Medication 3 MILLILITER(S): at 15:09

## 2018-07-16 RX ADMIN — Medication 3 MILLILITER(S): at 22:17

## 2018-07-16 RX ADMIN — Medication 3 MILLILITER(S): at 04:59

## 2018-07-16 RX ADMIN — Medication 6: at 17:47

## 2018-07-16 RX ADMIN — CARVEDILOL PHOSPHATE 12.5 MILLIGRAM(S): 80 CAPSULE, EXTENDED RELEASE ORAL at 05:35

## 2018-07-16 RX ADMIN — PREGABALIN 1000 MICROGRAM(S): 225 CAPSULE ORAL at 12:40

## 2018-07-16 RX ADMIN — Medication 25 MILLIGRAM(S): at 17:47

## 2018-07-16 RX ADMIN — Medication 20 MILLIGRAM(S): at 21:02

## 2018-07-16 NOTE — PHYSICAL THERAPY INITIAL EVALUATION ADULT - ADDITIONAL COMMENTS
Pt. has HHA services 7 days x 12 hours.  Wife at bedside reports pt. has been non-ambulatory x 2 months, requiring (2) person assist for bed <--> wheelchair transfers.      Pt. left in bed post-PT in NAD with all lines/tubes intact & call bell within reach.  Wife at bedside.  FRANTZ RAMESH aware.  (+) bed alarm engaged.

## 2018-07-16 NOTE — PHYSICAL THERAPY INITIAL EVALUATION ADULT - CRITERIA FOR SKILLED THERAPEUTIC INTERVENTIONS
impairments found/predicted duration of therapy intervention/therapy frequency/anticipated equipment needs at discharge/anticipated discharge recommendation/Home with home PT and a rolling walker

## 2018-07-16 NOTE — PHYSICAL THERAPY INITIAL EVALUATION ADULT - PATIENT PROFILE REVIEW, REHAB EVAL
ACTIVITY: out of bed with assistance; consult with Vani Sommers RN --> pt. OK for PT as tolerated/yes

## 2018-07-16 NOTE — PROGRESS NOTE ADULT - SUBJECTIVE AND OBJECTIVE BOX
CARDIOLOGY FOLLOW UP - Dr. Flaherty    CC no chest pain or sob        PHYSICAL EXAM:  T(C): 36.6 (07-16-18 @ 05:33), Max: 36.9 (07-15-18 @ 22:13)  HR: 68 (07-16-18 @ 09:13) (62 - 81)  BP: 160/58 (07-16-18 @ 05:33) (155/53 - 178/56)  RR: 18 (07-16-18 @ 05:33) (18 - 18)  SpO2: 96% (07-16-18 @ 05:33) (96% - 100%)  Wt(kg): --  I&O's Summary      Appearance: Normal	  Cardiovascular: Normal S1 S2,RRR  Respiratory: Lungs clear to auscultation	  Gastrointestinal:  Soft, Non-tender, + BS	  Extremities: Normal range of motion, No clubbing, cyanosis or edema        MEDICATIONS  (STANDING):  ALBUTerol/ipratropium for Nebulization 3 milliLiter(s) Nebulizer every 6 hours  aspirin enteric coated 81 milliGRAM(s) Oral daily  atorvastatin 40 milliGRAM(s) Oral at bedtime  carvedilol 12.5 milliGRAM(s) Oral daily  cyanocobalamin 1000 MICROGram(s) Oral daily  dextrose 5%. 1000 milliLiter(s) (50 mL/Hr) IV Continuous <Continuous>  dextrose 50% Injectable 12.5 Gram(s) IV Push once  dextrose 50% Injectable 25 Gram(s) IV Push once  dextrose 50% Injectable 25 Gram(s) IV Push once  heparin  Injectable 5000 Unit(s) SubCutaneous every 8 hours  hydrALAZINE 25 milliGRAM(s) Oral every 12 hours  insulin lispro (HumaLOG) corrective regimen sliding scale   SubCutaneous three times a day before meals  losartan 100 milliGRAM(s) Oral daily  methylPREDNISolone sodium succinate Injectable 20 milliGRAM(s) IV Push every 8 hours  NIFEdipine XL 60 milliGRAM(s) Oral daily      TELEMETRY: 	    ECG:  	  RADIOLOGY:   DIAGNOSTIC TESTING:  [ ] Echocardiogram:  [ ]  Catheterization:  [ ] Stress Test:    OTHER: 	    LABS:	 	                                9.8    5.18  )-----------( 182      ( 16 Jul 2018 05:20 )             31.1     07-16    131<L>  |  89<L>  |  34<H>  ----------------------------<  189<H>  5.0   |  22  |  4.73<H>    Ca    7.9<L>      16 Jul 2018 05:20    TPro  6.8  /  Alb  3.3  /  TBili  0.4  /  DBili  x   /  AST  16  /  ALT  6   /  AlkPhos  33<L>  07-16

## 2018-07-16 NOTE — PROGRESS NOTE ADULT - SUBJECTIVE AND OBJECTIVE BOX
[facility] NEPHROLOGY- PROGRESS NOTE    Patient is a 77y Male with ESRD on HD TTS who was recently admitted to [facility] (1 week ago) for hypertensive emeregnecy with volume overload nad pleural effusions, who presents today with cough X 2 days and was found to have parainfluenza bronchitis.      No Known Allergies    Hospital Medications: Reviewed      REVIEW OF SYSTEMS:  CONSTITUTIONAL: + weakness, fevers or chills  RESPIRATORY: +cough productive of brownish sputum  CARDIOVASCULAR: No chest pain or palpitations.  GASTROINTESTINAL: No nausea, vomiting, or abd pain  VASCULAR: No bilateral lower extremity edema.   All other review of systems is negative unless indicated above.    VITALS:  T(F): 98.1 (18 @ 12:49), Max: 98.5 (07-15-18 @ 22:13)  HR: 65 (18 @ 12:49)  BP: 111/45 (18 @ 12:49)  RR: 17 (18 @ 12:49)  SpO2: 99% (18 @ 12:49)  Wt(kg): --    PHYSICAL EXAM:  Constitutional: NAD  Respiratory: CTA  ant  Cardiovascular: S1, S2, RRR  Gastrointestinal: BS+, soft, NT/ND  Extremities: No peripheral edema  Vascular Access: Left AVF benign, +thrill +bruit    LABS:      131<L>  |  89<L>  |  34<H>  ----------------------------<  189<H>  5.0   |  22  |  4.73<H>    Ca    7.9<L>      2018 05:20    TPro  6.8  /  Alb  3.3  /  TBili  0.4  /  DBili      /  AST  16  /  ALT  6   /  AlkPhos  33<L>      Creatinine Trend: 4.73 <--, 4.71 <--                        9.8    5.18  )-----------( 182      ( 2018 05:20 )             31.1     Urine Studies:  Urinalysis Basic - ( 2018 07:55 )    Color: YELLOW / Appearance: CLEAR / S.009 / pH: 8.5  Gluc: 50 / Ketone: NEGATIVE  / Bili: NEGATIVE / Urobili: NORMAL mg/dL   Blood: NEGATIVE / Protein: 500 mg/dL / Nitrite: NEGATIVE   Leuk Esterase: NEGATIVE / RBC: 0-2 / WBC 0-2   Sq Epi:  / Non Sq Epi:  / Bacteria:

## 2018-07-16 NOTE — PHYSICAL THERAPY INITIAL EVALUATION ADULT - PERTINENT HX OF CURRENT PROBLEM, REHAB EVAL
Pt. is a 76 y/o male admitted to [facility] on 07/14/18 with a dx of fever, SOB, and cough.  PT consult request for evaluation.  H/O Dementia.  Recently at [facility].  CXR = small Left pleural reaction.

## 2018-07-16 NOTE — PHYSICAL THERAPY INITIAL EVALUATION ADULT - LEVEL OF INDEPENDENCE: STAIR NEGOTIATION, REHAB EVAL
not assessed at this time --> pt. no longer ambulates up and down stairs --> wife at bedside reports that pt. is carried down the stairs on a stretcher for hemodialysis

## 2018-07-16 NOTE — PROGRESS NOTE ADULT - ASSESSMENT
Problem/Plan - 1:  ·  Problem: Shortness of breath.  Plan: telemonitor   stable  cards following    Problem/Plan - 2:  ·  Problem: ESRD (end stage renal disease).  Plan: monitor cr  renal fu    Problem/Plan - 3:  ·  Problem: CAD (coronary artery disease).  Plan: Continue ASA, Lipitor.     Problem/Plan - 4:  ·  Problem: HLD (hyperlipidemia).  Plan: Continue Lipitor.     Problem/Plan - 5:  ·  Problem: DM (diabetes mellitus).  Plan: Continue Insulin Sliding Scale.   Monitor Fingersticks.

## 2018-07-16 NOTE — PROGRESS NOTE ADULT - ASSESSMENT
77 M PMH ESRD on HD, T2DM on oral hypoglycemics, presents with c/o cough, shortness of breath.        1. Acute respiratory failure with hypoxia  + parainfluenzae  cont supportive care  cv stable, no CHF on exam     2. Encephalopathy acute.   improving, med f/u     3.ESRD on HD  HD per renal      4. hypertension.  Bp elevated, increase coreg to 25 mg PO BID 77 M PMH ESRD on HD, T2DM on oral hypoglycemics, presents with c/o cough, shortness of breath.    1. Acute respiratory failure with hypoxia  + parainfluenzae  cont supportive care  cv stable, no CHF on exam     2. Encephalopathy acute.   improving, med f/u     3.ESRD on HD  HD per renal      4. hypertension.  Bp elevated, increase coreg to 25 mg PO BID

## 2018-07-16 NOTE — PROGRESS NOTE ADULT - SUBJECTIVE AND OBJECTIVE BOX
Patient is a 77y old  Male who presents with a chief complaint of Shortness of breath, cough (14 Jul 2018 11:46)      INTERVAL HPI/OVERNIGHT EVENTS:  T(C): 36.7 (07-16-18 @ 12:49), Max: 36.9 (07-15-18 @ 22:13)  HR: 65 (07-16-18 @ 17:46) (62 - 81)  BP: 125/57 (07-16-18 @ 17:46) (111/45 - 178/56)  RR: 17 (07-16-18 @ 12:49) (17 - 18)  SpO2: 99% (07-16-18 @ 12:49) (96% - 99%)  Wt(kg): --  I&O's Summary      LABS:                        9.8    5.18  )-----------( 182      ( 16 Jul 2018 05:20 )             31.1     07-16    131<L>  |  89<L>  |  34<H>  ----------------------------<  189<H>  5.0   |  22  |  4.73<H>    Ca    7.9<L>      16 Jul 2018 05:20    TPro  6.8  /  Alb  3.3  /  TBili  0.4  /  DBili  x   /  AST  16  /  ALT  6   /  AlkPhos  33<L>  07-16        CAPILLARY BLOOD GLUCOSE      POCT Blood Glucose.: 280 mg/dL (16 Jul 2018 17:12)  POCT Blood Glucose.: 328 mg/dL (16 Jul 2018 12:49)  POCT Blood Glucose.: 222 mg/dL (16 Jul 2018 09:01)  POCT Blood Glucose.: 191 mg/dL (15 Jul 2018 22:49)            MEDICATIONS  (STANDING):  ALBUTerol/ipratropium for Nebulization 3 milliLiter(s) Nebulizer every 6 hours  aspirin enteric coated 81 milliGRAM(s) Oral daily  atorvastatin 40 milliGRAM(s) Oral at bedtime  carvedilol 25 milliGRAM(s) Oral every 12 hours  cyanocobalamin 1000 MICROGram(s) Oral daily  dextrose 5%. 1000 milliLiter(s) (50 mL/Hr) IV Continuous <Continuous>  dextrose 50% Injectable 12.5 Gram(s) IV Push once  dextrose 50% Injectable 25 Gram(s) IV Push once  dextrose 50% Injectable 25 Gram(s) IV Push once  epoetin estelle Injectable 4000 Unit(s) IV Push <User Schedule>  heparin  Injectable 5000 Unit(s) SubCutaneous every 8 hours  hydrALAZINE 25 milliGRAM(s) Oral every 12 hours  insulin lispro (HumaLOG) corrective regimen sliding scale   SubCutaneous three times a day before meals  losartan 100 milliGRAM(s) Oral daily  methylPREDNISolone sodium succinate Injectable 20 milliGRAM(s) IV Push every 8 hours  NIFEdipine XL 60 milliGRAM(s) Oral daily    MEDICATIONS  (PRN):  aluminum hydroxide/magnesium hydroxide/simethicone Suspension 30 milliLiter(s) Oral once PRN Dyspepsia  benzonatate 100 milliGRAM(s) Oral three times a day PRN Cough  dextrose 40% Gel 15 Gram(s) Oral once PRN Blood Glucose LESS THAN 70 milliGRAM(s)/deciliter  glucagon  Injectable 1 milliGRAM(s) IntraMuscular once PRN Glucose LESS THAN 70 milligrams/deciliter          PHYSICAL EXAM:  GENERAL: NAD, well-groomed, well-developed  HEAD:  Atraumatic, Normocephalic  CHEST/LUNG: Clear to percussion bilaterally; No rales, rhonchi, wheezing, or rubs  HEART: Regular rate and rhythm; No murmurs, rubs, or gallops  ABDOMEN: Soft, Nontender, Nondistended; Bowel sounds present  EXTREMITIES:  2+ Peripheral Pulses, No clubbing, cyanosis, or edema  LYMPH: No lymphadenopathy noted  SKIN: No rashes or lesions    Care Discussed with Consultants/Other Providers [ ] YES  [ ] NO

## 2018-07-16 NOTE — PROGRESS NOTE ADULT - PROBLEM SELECTOR PLAN 2
BP improved. Continue with current anti-hypertensive medications. If BP remains elevated, increased Nifedipine ER to 90g PO daily in am and switch Losartan 100g PO  to bedtime. Monitor BP.

## 2018-07-16 NOTE — PROGRESS NOTE ADULT - SUBJECTIVE AND OBJECTIVE BOX
Patient is a 77y old  Male who presents with a chief complaint of Shortness of breath, cough (14 Jul 2018 11:46)      Any change in ROS: still coughing:      MEDICATIONS  (STANDING):  ALBUTerol/ipratropium for Nebulization 3 milliLiter(s) Nebulizer every 6 hours  aspirin enteric coated 81 milliGRAM(s) Oral daily  atorvastatin 40 milliGRAM(s) Oral at bedtime  carvedilol 25 milliGRAM(s) Oral every 12 hours  cyanocobalamin 1000 MICROGram(s) Oral daily  dextrose 5%. 1000 milliLiter(s) (50 mL/Hr) IV Continuous <Continuous>  dextrose 50% Injectable 12.5 Gram(s) IV Push once  dextrose 50% Injectable 25 Gram(s) IV Push once  dextrose 50% Injectable 25 Gram(s) IV Push once  epoetin estelle Injectable 4000 Unit(s) IV Push <User Schedule>  heparin  Injectable 5000 Unit(s) SubCutaneous every 8 hours  hydrALAZINE 25 milliGRAM(s) Oral every 12 hours  insulin lispro (HumaLOG) corrective regimen sliding scale   SubCutaneous three times a day before meals  losartan 100 milliGRAM(s) Oral daily  methylPREDNISolone sodium succinate Injectable 20 milliGRAM(s) IV Push every 8 hours  NIFEdipine XL 60 milliGRAM(s) Oral daily    MEDICATIONS  (PRN):  aluminum hydroxide/magnesium hydroxide/simethicone Suspension 30 milliLiter(s) Oral once PRN Dyspepsia  benzonatate 100 milliGRAM(s) Oral three times a day PRN Cough  dextrose 40% Gel 15 Gram(s) Oral once PRN Blood Glucose LESS THAN 70 milliGRAM(s)/deciliter  glucagon  Injectable 1 milliGRAM(s) IntraMuscular once PRN Glucose LESS THAN 70 milligrams/deciliter    Vital Signs Last 24 Hrs  T(C): 36.7 (16 Jul 2018 12:49), Max: 36.9 (15 Jul 2018 22:13)  T(F): 98.1 (16 Jul 2018 12:49), Max: 98.5 (15 Jul 2018 22:13)  HR: 68 (16 Jul 2018 15:10) (62 - 81)  BP: 111/45 (16 Jul 2018 12:49) (111/45 - 178/56)  BP(mean): --  RR: 17 (16 Jul 2018 12:49) (17 - 18)  SpO2: 99% (16 Jul 2018 12:49) (96% - 99%)    I&O's Summary        Physical Exam:   GENERAL: NAD, well-groomed, well-developed  HEENT: ROBERT/   Atraumatic, Normocephalic  ENMT: No tonsillar erythema, exudates, or enlargement; Moist mucous membranes, Good dentition, No lesions  NECK: Supple, No JVD, Normal thyroid  CHEST/LUNG: minimal wheezing  CVS: Regular rate and rhythm; No murmurs, rubs, or gallops  GI: : Soft, Nontender, Nondistended; Bowel sounds present  NERVOUS SYSTEM:  Alert & Oriented X3  EXTREMITIES:  -edema  LYMPH: No lymphadenopathy noted  SKIN: No rashes or lesions  ENDOCRINOLOGY: No Thyromegaly  PSYCH: Appropriate    Labs:  29                            9.8    5.18  )-----------( 182      ( 16 Jul 2018 05:20 )             31.1                         8.8    5.65  )-----------( 159      ( 14 Jul 2018 06:00 )             28.7     07-16    131<L>  |  89<L>  |  34<H>  ----------------------------<  189<H>  5.0   |  22  |  4.73<H>  07-14    138  |  93<L>  |  39<H>  ----------------------------<  167<H>  3.5   |  28  |  4.71<H>    Ca    7.9<L>      16 Jul 2018 05:20    TPro  6.8  /  Alb  3.3  /  TBili  0.4  /  DBili  x   /  AST  16  /  ALT  6   /  AlkPhos  33<L>  07-16  TPro  7.2  /  Alb  3.6  /  TBili  0.4  /  DBili  x   /  AST  19  /  ALT  11  /  AlkPhos  34<L>  07-14    CAPILLARY BLOOD GLUCOSE      POCT Blood Glucose.: 280 mg/dL (16 Jul 2018 17:12)  POCT Blood Glucose.: 328 mg/dL (16 Jul 2018 12:49)  POCT Blood Glucose.: 222 mg/dL (16 Jul 2018 09:01)  POCT Blood Glucose.: 191 mg/dL (15 Jul 2018 22:49)  POCT Blood Glucose.: 122 mg/dL (15 Jul 2018 17:17)      LIVER FUNCTIONS - ( 16 Jul 2018 05:20 )  Alb: 3.3 g/dL / Pro: 6.8 g/dL / ALK PHOS: 33 u/L / ALT: 6 u/L / AST: 16 u/L / GGT: x                     RECENT CULTURES:  07-14 @ 09:13 URINE MIDSTREAM                  07-14 @ 06:47 BLOOD VENOUS         < from: Xray Chest 1 View- PORTABLE-Urgent (07.14.18 @ 05:27) >    EXAM:  XR CHEST PORTABLE URGENT 1V        PROCEDURE DATE:  Jul 14 2018         INTERPRETATION:  CLINICAL INFORMATION: Fever.    EXAM: Portable AP radiograph of the chest. Comparison is made tochest   x-ray of 7/7/2018.    IMPRESSION:    Redemonstrated slightly blunted left CP angle compatible with persistent   small left pleural reaction. Sharp appearing right CP angle. Arpan    Increased left retrocardiac density obscuring the left hemidiaphragm   raises suspicion for underlying airspace consolidation including possible   infiltrate/pneumonia in the proper clinical context. Clear remaining   visualized lungs. No pneumothorax.    Stable cardiac and mediastinal silhouettes.     Trachea midline.    Generalized osteopenia and mild spinal degenerative changes again noted.     Redemonstrated multiple overlapping metallic mesh vascular stents   traversing left brachiocephalic, subclavian, axillary, and medial   brachial regions.    Implanted loop recorder again seen over lower left hemithorax.              ANGEL SANCHEZ M.D., RADIOLOGY RESIDENT  This document has been electronically signed.  MIRTA LITTLE M.D., ATTENDING RADIOLOGIST  This document has been electronically signed. Jul 14 2018  9:39AM        < end of copied text >           NO ORGANISMS ISOLATED  NO ORGANISMS ISOLATED AT 48 HRS.  07-14 @ 04:59 BLOOD PERIPHERAL                  NO ORGANISMS ISOLATED  NO ORGANISMS ISOLATED AT 48 HRS.        RESPIRATORY CULTURES:          Studies  Chest X-RAY  CT SCAN Chest   Venous Dopplers: LE:   CT Abdomen  Others

## 2018-07-17 ENCOUNTER — TRANSCRIPTION ENCOUNTER (OUTPATIENT)
Age: 78
End: 2018-07-17

## 2018-07-17 VITALS
SYSTOLIC BLOOD PRESSURE: 128 MMHG | RESPIRATION RATE: 16 BRPM | TEMPERATURE: 98 F | HEART RATE: 65 BPM | OXYGEN SATURATION: 99 % | DIASTOLIC BLOOD PRESSURE: 56 MMHG

## 2018-07-17 LAB
BUN SERPL-MCNC: 56 MG/DL — HIGH (ref 7–23)
CALCIUM SERPL-MCNC: 7.5 MG/DL — LOW (ref 8.4–10.5)
CHLORIDE SERPL-SCNC: 90 MMOL/L — LOW (ref 98–107)
CO2 SERPL-SCNC: 24 MMOL/L — SIGNIFICANT CHANGE UP (ref 22–31)
CREAT SERPL-MCNC: 6.09 MG/DL — HIGH (ref 0.5–1.3)
GLUCOSE SERPL-MCNC: 284 MG/DL — HIGH (ref 70–99)
HCT VFR BLD CALC: 28.9 % — LOW (ref 39–50)
HGB BLD-MCNC: 9.2 G/DL — LOW (ref 13–17)
MCHC RBC-ENTMCNC: 27.5 PG — SIGNIFICANT CHANGE UP (ref 27–34)
MCHC RBC-ENTMCNC: 31.8 % — LOW (ref 32–36)
MCV RBC AUTO: 86.5 FL — SIGNIFICANT CHANGE UP (ref 80–100)
NRBC # FLD: 0 — SIGNIFICANT CHANGE UP
PLATELET # BLD AUTO: 186 K/UL — SIGNIFICANT CHANGE UP (ref 150–400)
PMV BLD: 10.9 FL — SIGNIFICANT CHANGE UP (ref 7–13)
POTASSIUM SERPL-MCNC: 4.6 MMOL/L — SIGNIFICANT CHANGE UP (ref 3.5–5.3)
POTASSIUM SERPL-SCNC: 4.6 MMOL/L — SIGNIFICANT CHANGE UP (ref 3.5–5.3)
RBC # BLD: 3.34 M/UL — LOW (ref 4.2–5.8)
RBC # FLD: 15 % — HIGH (ref 10.3–14.5)
SODIUM SERPL-SCNC: 131 MMOL/L — LOW (ref 135–145)
WBC # BLD: 5.77 K/UL — SIGNIFICANT CHANGE UP (ref 3.8–10.5)
WBC # FLD AUTO: 5.77 K/UL — SIGNIFICANT CHANGE UP (ref 3.8–10.5)

## 2018-07-17 RX ORDER — CARVEDILOL PHOSPHATE 80 MG/1
12.5 CAPSULE, EXTENDED RELEASE ORAL EVERY 12 HOURS
Qty: 0 | Refills: 0 | Status: DISCONTINUED | OUTPATIENT
Start: 2018-07-17 | End: 2018-07-17

## 2018-07-17 RX ORDER — ATORVASTATIN CALCIUM 80 MG/1
1 TABLET, FILM COATED ORAL
Qty: 0 | Refills: 0 | COMMUNITY

## 2018-07-17 RX ORDER — METOPROLOL TARTRATE 50 MG
1 TABLET ORAL
Qty: 0 | Refills: 0 | COMMUNITY

## 2018-07-17 RX ORDER — LOSARTAN POTASSIUM 100 MG/1
1 TABLET, FILM COATED ORAL
Qty: 30 | Refills: 0 | OUTPATIENT
Start: 2018-07-17 | End: 2018-08-15

## 2018-07-17 RX ORDER — MIRTAZAPINE 45 MG/1
1 TABLET, ORALLY DISINTEGRATING ORAL
Qty: 0 | Refills: 0 | COMMUNITY

## 2018-07-17 RX ORDER — LOSARTAN POTASSIUM 100 MG/1
1 TABLET, FILM COATED ORAL
Qty: 0 | Refills: 0 | COMMUNITY

## 2018-07-17 RX ORDER — ATORVASTATIN CALCIUM 80 MG/1
1 TABLET, FILM COATED ORAL
Qty: 30 | Refills: 0 | OUTPATIENT
Start: 2018-07-17 | End: 2018-08-15

## 2018-07-17 RX ADMIN — HEPARIN SODIUM 5000 UNIT(S): 5000 INJECTION INTRAVENOUS; SUBCUTANEOUS at 05:57

## 2018-07-17 RX ADMIN — Medication 8: at 09:09

## 2018-07-17 RX ADMIN — Medication 3 MILLILITER(S): at 03:51

## 2018-07-17 RX ADMIN — Medication 2: at 13:31

## 2018-07-17 RX ADMIN — ERYTHROPOIETIN 4000 UNIT(S): 10000 INJECTION, SOLUTION INTRAVENOUS; SUBCUTANEOUS at 11:44

## 2018-07-17 RX ADMIN — Medication 25 MILLIGRAM(S): at 05:56

## 2018-07-17 RX ADMIN — LOSARTAN POTASSIUM 100 MILLIGRAM(S): 100 TABLET, FILM COATED ORAL at 05:56

## 2018-07-17 RX ADMIN — CARVEDILOL PHOSPHATE 25 MILLIGRAM(S): 80 CAPSULE, EXTENDED RELEASE ORAL at 05:56

## 2018-07-17 RX ADMIN — Medication 20 MILLIGRAM(S): at 05:57

## 2018-07-17 RX ADMIN — Medication 3 MILLILITER(S): at 09:30

## 2018-07-17 RX ADMIN — Medication 60 MILLIGRAM(S): at 05:56

## 2018-07-17 NOTE — DISCHARGE NOTE ADULT - MEDICATION SUMMARY - MEDICATIONS TO TAKE
I will START or STAY ON the medications listed below when I get home from the hospital:    Rollator1   -- Indication: For walker    predniSONE 20 mg oral tablet  -- 2 tab(s) by mouth once a day  -- Indication: For Bronchitis    aspirin 81 mg oral delayed release tablet  -- 1 tab(s) by mouth once a day  -- Indication: For Heart protective    losartan 50 mg oral tablet  -- 1 tab(s) by mouth once a day  -- Indication: For HTN    cloNIDine 0.1 mg oral tablet  -- 1 tab(s) by mouth 2 times a day  -- Indication: For HTN    mirtazapine 15 mg oral tablet  -- 1 tab(s) by mouth once a day (at bedtime)  -- Indication: For Antidepressant    glimepiride 2 mg oral tablet  -- 1 tab(s) by mouth once a day  -- Indication: For DM    atorvastatin 80 mg oral tablet  -- 1 tab(s) by mouth once a day  -- Indication: For HLD    benzonatate 100 mg oral capsule  -- 1 cap(s) by mouth 3 times a day, As needed, Cough  -- Indication: For Cough    carvedilol 12.5 mg oral tablet  -- 1 tab(s) by mouth 2 times a day  -- Indication: For HTN    Toprol- mg oral tablet, extended release  -- 1 tab(s) by mouth once a day  -- Indication: For HTN    NIFEdipine 60 mg oral tablet, extended release  -- 1 tab(s) by mouth once a day  -- Indication: For HTN    hydrALAZINE 25 mg oral tablet  -- 1 tab(s) by mouth every 12 hours  -- Indication: For HTN    B-12 1000 mcg oral tablet, extended release  -- 1 tab(s) by mouth once a day   -- Check with your doctor before becoming pregnant.    -- Indication: For Vitamin I will START or STAY ON the medications listed below when I get home from the hospital:    Rollator1   -- Indication: For walker    predniSONE 20 mg oral tablet  -- 2 tab(s) by mouth once a day  -- Indication: For Bronchitis    aspirin 81 mg oral delayed release tablet  -- 1 tab(s) by mouth once a day  -- Indication: For Heart protective    Cozaar 100 mg oral tablet  -- 1 tab(s) by mouth once a day  -- Indication: For HTN    Lipitor 40 mg oral tablet  -- 1 tab(s) by mouth once a day (at bedtime)  -- Indication: For HLD    benzonatate 100 mg oral capsule  -- 1 cap(s) by mouth 3 times a day, As needed, Cough  -- Indication: For Cough    carvedilol 12.5 mg oral tablet  -- 1 tab(s) by mouth 2 times a day  -- Indication: For HTN    NIFEdipine 60 mg oral tablet, extended release  -- 1 tab(s) by mouth once a day  -- Indication: For HTN    hydrALAZINE 25 mg oral tablet  -- 1 tab(s) by mouth every 12 hours  -- Indication: For HTN    B-12 1000 mcg oral tablet, extended release  -- 1 tab(s) by mouth once a day   -- Check with your doctor before becoming pregnant.    -- Indication: For Vitamin

## 2018-07-17 NOTE — PROGRESS NOTE ADULT - PROBLEM SELECTOR PLAN 1
secondary to parainfluenza viral infection: wheezing is much better at this time:  7/17: wheezing is better: change to po steroids:

## 2018-07-17 NOTE — PROGRESS NOTE ADULT - PROBLEM SELECTOR PLAN 2
wheezing is better: cont steroids for today too IV!!  7/17: Wheezing is better: change to po steroids for a few days more!

## 2018-07-17 NOTE — DISCHARGE NOTE ADULT - PLAN OF CARE
Monitor for recurrent fever- Monitor labs Continue w/ dialysis Continue with BP meds Maintain Isolation precaution and maintain good hygiene Followup with PMD and take all medications prescribed.

## 2018-07-17 NOTE — DISCHARGE NOTE ADULT - ADDITIONAL INSTRUCTIONS
Return to the ER for any worsening symptoms, concerns, chest pain, fevers, shortness of breath, vomiting, abdominal pain, rashes, neck pain, back pain, numbness, paresthesias, pain or any difficulties at all.  Please follow up with your own private physician or our medical clinic at [phone] with in one week.   Find a doctor at [phone].  For questions regarding prescriptions call .

## 2018-07-17 NOTE — PROGRESS NOTE ADULT - SUBJECTIVE AND OBJECTIVE BOX
[facility] NEPHROLOGY- PROGRESS NOTE    Patient is a 77y Male with ESRD on HD TTS who was recently admitted to [facility] (1 week ago) for hypertensive emeregnecy with volume overload nad pleural effusions, who presents today with cough X 2 days and was found to have parainfluenza bronchitis.      No Known Allergies    Hospital Medications: Reviewed      REVIEW OF SYSTEMS:  CONSTITUTIONAL: + weakness, fevers or chills  RESPIRATORY: +cough   CARDIOVASCULAR: No chest pain or palpitations.  GASTROINTESTINAL: No nausea, vomiting, or abd pain  VASCULAR: No bilateral lower extremity edema.   All other review of systems is negative unless indicated above.    VITALS:  T(F): 97.7 (18 @ 15:29), Max: 98.6 (18 @ 05:55)  HR: 60 (18 @ 15:29)  BP: 132/52 (18 @ 15:29)  RR: 16 (18 @ 15:29)  SpO2: 99% (18 @ 05:55)  Wt(kg): --     @ 07:01  -   @ 15:55  --------------------------------------------------------  IN: 400 mL / OUT: 2400 mL / NET: -2000 mL        PHYSICAL EXAM:  Constitutional: NAD  Respiratory: CTA  ant  Cardiovascular: S1, S2, RRR  Gastrointestinal: BS+, soft, NT/ND  Extremities: No peripheral edema  Vascular Access: Left AVF benign, +thrill +bruit    LABS:      131<L>  |  90<L>  |  56<H>  ----------------------------<  284<H>  4.6   |  24  |  6.09<H>    Ca    7.5<L>      2018 06:12    TPro  6.8  /  Alb  3.3  /  TBili  0.4  /  DBili      /  AST  16  /  ALT  6   /  AlkPhos  33<L>      Creatinine Trend: 6.09 <--, 4.73 <--, 4.71 <--                        9.2    5.77  )-----------( 186      ( 2018 06:12 )             28.9     Urine Studies:  Urinalysis Basic - ( 2018 07:55 )    Color: YELLOW / Appearance: CLEAR / S.009 / pH: 8.5  Gluc: 50 / Ketone: NEGATIVE  / Bili: NEGATIVE / Urobili: NORMAL mg/dL   Blood: NEGATIVE / Protein: 500 mg/dL / Nitrite: NEGATIVE   Leuk Esterase: NEGATIVE / RBC: 0-2 / WBC 0-2   Sq Epi:  / Non Sq Epi:  / Bacteria:

## 2018-07-17 NOTE — DISCHARGE NOTE ADULT - MEDICATION SUMMARY - MEDICATIONS TO STOP TAKING
I will STOP taking the medications listed below when I get home from the hospital:  None I will STOP taking the medications listed below when I get home from the hospital:    cloNIDine 0.1 mg oral tablet  -- 1 tab(s) by mouth 2 times a day    Toprol- mg oral tablet, extended release  -- 1 tab(s) by mouth once a day

## 2018-07-17 NOTE — PROGRESS NOTE ADULT - SUBJECTIVE AND OBJECTIVE BOX
CARDIOLOGY FOLLOW UP - Dr. Flaherty    CC no chest pain or sob        PHYSICAL EXAM:  T(C): 37 (07-17-18 @ 05:55), Max: 37 (07-17-18 @ 05:55)  HR: 68 (07-17-18 @ 09:30) (65 - 78)  BP: 150/64 (07-17-18 @ 05:55) (111/45 - 150/64)  RR: 18 (07-17-18 @ 05:55) (17 - 18)  SpO2: 99% (07-17-18 @ 05:55) (96% - 99%)  Wt(kg): --  I&O's Summary      Appearance: Normal	  Cardiovascular: Normal S1 S2,RRR, No JVD, No murmurs  Respiratory: Lungs clear to auscultation	  Gastrointestinal:  Soft, Non-tender, + BS	  Extremities: Normal range of motion, No clubbing, cyanosis or edema        MEDICATIONS  (STANDING):  ALBUTerol/ipratropium for Nebulization 3 milliLiter(s) Nebulizer every 6 hours  aspirin enteric coated 81 milliGRAM(s) Oral daily  atorvastatin 40 milliGRAM(s) Oral at bedtime  carvedilol 25 milliGRAM(s) Oral every 12 hours  cyanocobalamin 1000 MICROGram(s) Oral daily  dextrose 5%. 1000 milliLiter(s) (50 mL/Hr) IV Continuous <Continuous>  dextrose 50% Injectable 12.5 Gram(s) IV Push once  dextrose 50% Injectable 25 Gram(s) IV Push once  dextrose 50% Injectable 25 Gram(s) IV Push once  epoetin estelle Injectable 4000 Unit(s) IV Push <User Schedule>  heparin  Injectable 5000 Unit(s) SubCutaneous every 8 hours  hydrALAZINE 25 milliGRAM(s) Oral every 12 hours  insulin lispro (HumaLOG) corrective regimen sliding scale   SubCutaneous three times a day before meals  losartan 100 milliGRAM(s) Oral daily  NIFEdipine XL 60 milliGRAM(s) Oral daily      TELEMETRY: 	    ECG:  	  RADIOLOGY:   DIAGNOSTIC TESTING:  [ ] Echocardiogram:  [ ]  Catheterization:  [ ] Stress Test:    OTHER: 	    LABS:	 	                                9.2    5.77  )-----------( 186      ( 17 Jul 2018 06:12 )             28.9     07-17    131<L>  |  90<L>  |  56<H>  ----------------------------<  284<H>  4.6   |  24  |  6.09<H>    Ca    7.5<L>      17 Jul 2018 06:12    TPro  6.8  /  Alb  3.3  /  TBili  0.4  /  DBili  x   /  AST  16  /  ALT  6   /  AlkPhos  33<L>  07-16

## 2018-07-17 NOTE — DISCHARGE NOTE ADULT - PATIENT PORTAL LINK FT
You can access the [facility] Patient Portal, offered by [facility], by registering with the following website: [email or web address]

## 2018-07-17 NOTE — DISCHARGE NOTE ADULT - HOME CARE AGENCY
[facility] [phone].   A visiting nurse will make her initial visit on 7/18.  If you have any issues, please contact the homecare agency directly.

## 2018-07-17 NOTE — PROGRESS NOTE ADULT - ASSESSMENT
77 M PMH ESRD on HD, T2DM on oral hypoglycemics, presents with c/o cough, shortness of breath.    1. Acute respiratory failure with hypoxia  + parainfluenzae  cont supportive care  cv stable, no CHF on exam     2. Encephalopathy acute.   improving, med f/u     3.ESRD on HD  HD per renal      4. hypertension.  Bp improving , cotninue with current anti-htn meds     dc planning today

## 2018-07-17 NOTE — DISCHARGE NOTE ADULT - MEDICATION SUMMARY - MEDICATIONS TO CHANGE
I will SWITCH the dose or number of times a day I take the medications listed below when I get home from the hospital:    losartan 100 mg oral tablet  -- 1 tab(s) by mouth once a day    atorvastatin 40 mg oral tablet  -- 1 tab(s) by mouth once a day (at bedtime) I will SWITCH the dose or number of times a day I take the medications listed below when I get home from the hospital:    atorvastatin 40 mg oral tablet  -- 1 tab(s) by mouth once a day (at bedtime)    atorvastatin 80 mg oral tablet  -- 1 tab(s) by mouth once a day    losartan 50 mg oral tablet  -- 1 tab(s) by mouth once a day

## 2018-07-17 NOTE — DISCHARGE NOTE ADULT - CARE PLAN
Principal Discharge DX:	Fever  Goal:	Monitor for recurrent fever-  Assessment and plan of treatment:	Monitor labs  Secondary Diagnosis:	ESRD on hemodialysis  Assessment and plan of treatment:	Continue w/ dialysis  Secondary Diagnosis:	Essential hypertension  Assessment and plan of treatment:	Continue with BP meds  Secondary Diagnosis:	Hypertensive kidney disease with stage 5 chronic kidney disease  Assessment and plan of treatment:	Continue with BP meds  Secondary Diagnosis:	Parainfluenza  Assessment and plan of treatment:	Maintain Isolation precaution and maintain good hygiene Principal Discharge DX:	Fever  Goal:	Monitor for recurrent fever-  Assessment and plan of treatment:	Monitor labs  Secondary Diagnosis:	ESRD on hemodialysis  Goal:	Followup with PMD and take all medications prescribed.  Assessment and plan of treatment:	Continue w/ dialysis  Secondary Diagnosis:	Essential hypertension  Assessment and plan of treatment:	Continue with BP meds  Secondary Diagnosis:	Hypertensive kidney disease with stage 5 chronic kidney disease  Assessment and plan of treatment:	Continue with BP meds  Secondary Diagnosis:	Parainfluenza  Assessment and plan of treatment:	Maintain Isolation precaution and maintain good hygiene

## 2018-07-17 NOTE — DISCHARGE NOTE ADULT - SECONDARY DIAGNOSIS.
ESRD on hemodialysis Essential hypertension Hypertensive kidney disease with stage 5 chronic kidney disease Parainfluenza

## 2018-07-17 NOTE — PROGRESS NOTE ADULT - ATTENDING COMMENTS
Agree with above NP note.  cv stable  cont bp meds  dc home
Agree with above NP note.  cv stable  inc bb for better bp control
[facility] NEPHROLOGY  Ihsan Saba M.D.  Eduardo Romano D.O.  Loni Ribeiro M.D.  Maris Ramirez, MSN, ANP-C    Telephone: [phone]  Facsimile: [phone]    [address] [place] [address]
[facility] NEPHROLOGY  Ihsan Saba M.D.  Eduardo Romano D.O.  Loni Ribeiro M.D.  Maris Ramirez, MSN, ANP-C    Telephone: [phone]  Facsimile: [phone]    [address] [place] [address]
[facility] NEPHROLOGY  Ihsan Saba M.D.  Eduardo Romano D.O.  Loni Ribeiro M.D.  Maris Ramirez, MSN, ANP-C    Telephone: [phone]  Facsimile: [phone]    [address] [place] [address]

## 2018-07-17 NOTE — PROGRESS NOTE ADULT - PROBLEM SELECTOR PLAN 1
Last HD today, pt with low normal SBP 90-100s with net 2L fluid removal. Plan for next maintenance HD 7/19 at outpt HD facility. Monitor BMP.

## 2018-07-17 NOTE — PROGRESS NOTE ADULT - SUBJECTIVE AND OBJECTIVE BOX
Patient is a 77y old  Male who presents with a chief complaint of Shortness of breath, cough (14 Jul 2018 11:46)      Any change in ROS: Still with cough :  no wheezing     MEDICATIONS  (STANDING):  ALBUTerol/ipratropium for Nebulization 3 milliLiter(s) Nebulizer every 6 hours  aspirin enteric coated 81 milliGRAM(s) Oral daily  atorvastatin 40 milliGRAM(s) Oral at bedtime  carvedilol 25 milliGRAM(s) Oral every 12 hours  cyanocobalamin 1000 MICROGram(s) Oral daily  dextrose 5%. 1000 milliLiter(s) (50 mL/Hr) IV Continuous <Continuous>  dextrose 50% Injectable 12.5 Gram(s) IV Push once  dextrose 50% Injectable 25 Gram(s) IV Push once  dextrose 50% Injectable 25 Gram(s) IV Push once  epoetin estelle Injectable 4000 Unit(s) IV Push <User Schedule>  heparin  Injectable 5000 Unit(s) SubCutaneous every 8 hours  hydrALAZINE 25 milliGRAM(s) Oral every 12 hours  insulin lispro (HumaLOG) corrective regimen sliding scale   SubCutaneous three times a day before meals  losartan 100 milliGRAM(s) Oral daily  NIFEdipine XL 60 milliGRAM(s) Oral daily    MEDICATIONS  (PRN):  aluminum hydroxide/magnesium hydroxide/simethicone Suspension 30 milliLiter(s) Oral once PRN Dyspepsia  benzonatate 100 milliGRAM(s) Oral three times a day PRN Cough  dextrose 40% Gel 15 Gram(s) Oral once PRN Blood Glucose LESS THAN 70 milliGRAM(s)/deciliter  glucagon  Injectable 1 milliGRAM(s) IntraMuscular once PRN Glucose LESS THAN 70 milligrams/deciliter    Vital Signs Last 24 Hrs  T(C): 36.2 (17 Jul 2018 11:05), Max: 37 (17 Jul 2018 05:55)  T(F): 97.2 (17 Jul 2018 11:05), Max: 98.6 (17 Jul 2018 05:55)  HR: 65 (17 Jul 2018 11:05) (65 - 78)  BP: 98/43 (17 Jul 2018 11:05) (98/43 - 150/64)  BP(mean): --  RR: 16 (17 Jul 2018 11:05) (16 - 18)  SpO2: 99% (17 Jul 2018 05:55) (96% - 99%)    I&O's Summary        Physical Exam:   GENERAL: NAD, well-groomed, well-developed  HEENT: ROBERT/   Atraumatic, Normocephalic  ENMT: No tonsillar erythema, exudates, or enlargement; Moist mucous membranes, Good dentition, No lesions  NECK: Supple, No JVD, Normal thyroid  CHEST/LUNG: not much of wheezing  CVS: Regular rate and rhythm; No murmurs, rubs, or gallops  GI: : Soft, Nontender, Nondistended; Bowel sounds present  NERVOUS SYSTEM:  Alert & Oriented X3  EXTREMITIES:  - edema  LYMPH: No lymphadenopathy noted  SKIN: No rashes or lesions  ENDOCRINOLOGY: No Thyromegaly  PSYCH: Appropriate    Labs:  29                            9.2    5.77  )-----------( 186      ( 17 Jul 2018 06:12 )             28.9                         9.8    5.18  )-----------( 182      ( 16 Jul 2018 05:20 )             31.1                         8.8    5.65  )-----------( 159      ( 14 Jul 2018 06:00 )             28.7     07-17    131<L>  |  90<L>  |  56<H>  ----------------------------<  284<H>  4.6   |  24  |  6.09<H>  07-16    131<L>  |  89<L>  |  34<H>  ----------------------------<  189<H>  5.0   |  22  |  4.73<H>  07-14    138  |  93<L>  |  39<H>  ----------------------------<  167<H>  3.5   |  28  |  4.71<H>    Ca    7.5<L>      17 Jul 2018 06:12  Ca    7.9<L>      16 Jul 2018 05:20    TPro  6.8  /  Alb  3.3  /  TBili  0.4  /  DBili  x   /  AST  16  /  ALT  6   /  AlkPhos  33<L>  07-16  TPro  7.2  /  Alb  3.6  /  TBili  0.4  /  DBili  x   /  AST  19  /  ALT  11  /  AlkPhos  34<L>  07-14    CAPILLARY BLOOD GLUCOSE      POCT Blood Glucose.: 336 mg/dL (17 Jul 2018 08:47)  POCT Blood Glucose.: 232 mg/dL (16 Jul 2018 22:34)  POCT Blood Glucose.: 280 mg/dL (16 Jul 2018 17:12)      LIVER FUNCTIONS - ( 16 Jul 2018 05:20 )  Alb: 3.3 g/dL / Pro: 6.8 g/dL / ALK PHOS: 33 u/L / ALT: 6 u/L / AST: 16 u/L / GGT: x                     RECENT CULTURES:  07-14 @ 09:13 URINE MIDSTREAM                  07-14 @ 06:47 BLOOD VENOUS                  NO ORGANISMS ISOLATED  NO ORGANISMS ISOLATED AT 72 HRS.  07-14 @ 04:59 BLOOD PERIPHERAL         < from: Xray Chest 1 View- PORTABLE-Urgent (07.14.18 @ 05:27) >    EXAM:  XR CHEST PORTABLE URGENT 1V        PROCEDURE DATE:  Jul 14 2018         INTERPRETATION:  CLINICAL INFORMATION: Fever.    EXAM: Portable AP radiograph of the chest. Comparison is made tochest   x-ray of 7/7/2018.    IMPRESSION:    Redemonstrated slightly blunted left CP angle compatible with persistent   small left pleural reaction. Sharp appearing right CP angle. Arpan    Increased left retrocardiac density obscuring the left hemidiaphragm   raises suspicion for underlying airspace consolidation including possible   infiltrate/pneumonia in the proper clinical context. Clear remaining   visualized lungs. No pneumothorax.    Stable cardiac and mediastinal silhouettes.     Trachea midline.    Generalized osteopenia and mild spinal degenerative changes again noted.     Redemonstrated multiple overlapping metallic mesh vascular stents   traversing left brachiocephalic, subclavian, axillary, and medial   brachial regions.    Implanted loop recorder again seen over lower left hemithorax.              ANGEL SANCHEZ M.D., RADIOLOGY RESIDENT  This document has been electronically signed.  MIRTA LITTLE M.D., ATTENDING RADIOLOGIST  This document has been electronically signed. Jul 14 2018  9:39AM        < end of copied text >           NO ORGANISMS ISOLATED  NO ORGANISMS ISOLATED AT 72 HRS.        RESPIRATORY CULTURES:          Studies  Chest X-RAY  CT SCAN Chest   Venous Dopplers: LE:   CT Abdomen  Others

## 2018-07-17 NOTE — DISCHARGE NOTE ADULT - HOSPITAL COURSE
77 M PMH ESRD on HD, T2DM on oral hypoglycemics, presents with c/o cough, shortness of breath, decreased responsiveness. Per ED charting, he was hypoxic to 80s in the field. VS in ED notable for rectal T 100.5. On exam, he is alert, oriented to self and place. No lower extremity edema. Labs without leukocytosis. RVP positive for parainfluenza. CXR with retrocardiac opacity.     Problem/Plan - 1:  ·  Problem: Shortness of breath.  Plan: telemonitor   stable  cards following     Problem/Plan - 2:  ·  Problem: ESRD (end stage renal disease).  Plan: monitor cr  renal fu     Problem/Plan - 3:  ·  Problem: CAD (coronary artery disease).  Plan: Continue ASA, Lipitor.      Problem/Plan - 4:  ·  Problem: HLD (hyperlipidemia).  Plan: Continue Lipitor.      Problem/Plan - 5:  ·  Problem: DM (diabetes mellitus).  Plan: Continue Insulin Sliding Scale.   Monitor Fingersticks. 77 M PMH ESRD on HD, T2DM on oral hypoglycemics, presents with c/o cough, shortness of breath, decreased responsiveness. Per ED charting, he was hypoxic to 80s in the field. VS in ED notable for rectal T 100.5. On exam, he is alert, oriented to self and place. No lower extremity edema. Labs without leukocytosis. RVP positive for parainfluenza. CXR with retrocardiac opacity.     Problem/Plan - 1:  ·  Problem: Shortness of breath.  Plan: telemonitor   stable  cards following     Problem/Plan - 2:  ·  Problem: ESRD (end stage renal disease).  Plan: monitor cr  renal fu     Problem/Plan - 3:  ·  Problem: CAD (coronary artery disease).  Plan: Continue ASA, Lipitor.      Problem/Plan - 4:  ·  Problem: HLD (hyperlipidemia).  Plan: Continue Lipitor.      Problem/Plan - 5:  ·  Problem: DM (diabetes mellitus).  Plan: Continue Insulin Sliding Scale.   Monitor Fingersticks.     As per attending, patient stable for discharge.

## 2018-07-19 LAB
BACTERIA BLD CULT: SIGNIFICANT CHANGE UP
BACTERIA BLD CULT: SIGNIFICANT CHANGE UP

## 2018-07-20 ENCOUNTER — OTHER (OUTPATIENT)
Age: 78
End: 2018-07-20
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
## 2018-07-24 PROBLEM — F03.90 UNSPECIFIED DEMENTIA WITHOUT BEHAVIORAL DISTURBANCE: Chronic | Status: ACTIVE | Noted: 2017-10-18

## 2018-07-24 PROBLEM — D69.6 THROMBOCYTOPENIA, UNSPECIFIED: Chronic | Status: ACTIVE | Noted: 2017-10-18

## 2018-08-20 ENCOUNTER — APPOINTMENT (OUTPATIENT)
Dept: ELECTROPHYSIOLOGY | Facility: CLINIC | Age: 78
End: 2018-08-20

## 2018-08-23 ENCOUNTER — APPOINTMENT (OUTPATIENT)
Dept: ELECTROPHYSIOLOGY | Facility: CLINIC | Age: 78
End: 2018-08-23
Payer: MEDICARE

## 2018-08-23 PROCEDURE — 93299: CPT

## 2018-08-23 PROCEDURE — 93298 REM INTERROG DEV EVAL SCRMS: CPT

## 2018-08-30 NOTE — ED ADULT TRIAGE NOTE - NS ED TRIAGE AVPU SCALE
Alert-The patient is alert, awake and responds to voice. The patient is oriented to time, place, and person. The triage nurse is able to obtain subjective information.
none

## 2018-09-20 ENCOUNTER — APPOINTMENT (OUTPATIENT)
Dept: ELECTROPHYSIOLOGY | Facility: CLINIC | Age: 78
End: 2018-09-20

## 2018-09-24 ENCOUNTER — APPOINTMENT (OUTPATIENT)
Dept: ELECTROPHYSIOLOGY | Facility: CLINIC | Age: 78
End: 2018-09-24
Payer: MEDICARE

## 2018-09-24 PROCEDURE — 93299: CPT

## 2018-09-24 PROCEDURE — 93298 REM INTERROG DEV EVAL SCRMS: CPT

## 2018-10-25 ENCOUNTER — APPOINTMENT (OUTPATIENT)
Dept: ELECTROPHYSIOLOGY | Facility: CLINIC | Age: 78
End: 2018-10-25
Payer: MEDICARE

## 2018-10-25 DIAGNOSIS — I63.312 CEREBRAL INFARCTION DUE TO THROMBOSIS OF LEFT MIDDLE CEREBRAL ARTERY: ICD-10-CM

## 2018-10-25 PROCEDURE — 93299: CPT

## 2018-10-25 PROCEDURE — 93298 REM INTERROG DEV EVAL SCRMS: CPT

## 2018-10-31 NOTE — ED PROVIDER NOTE - MUSCULOSKELETAL [+], MLM
Continue Regimen: Gentle cleansers and moisturizers daily Initiate Treatment: Fluocinonide Cream qd to bid to the areas on the arms PRN flare up to two toned weeks at a time Detail Level: Zone +wrist pain +hand pain

## 2018-12-05 ENCOUNTER — APPOINTMENT (OUTPATIENT)
Dept: ELECTROPHYSIOLOGY | Facility: CLINIC | Age: 78
End: 2018-12-05

## 2018-12-05 PROCEDURE — 93298 REM INTERROG DEV EVAL SCRMS: CPT

## 2018-12-05 PROCEDURE — 93299: CPT

## 2018-12-18 PROBLEM — I63.312 CEREBROVASCULAR ACCIDENT (CVA) DUE TO THROMBOSIS OF LEFT MIDDLE CEREBRAL ARTERY: Status: RESOLVED | Noted: 2017-11-16 | Resolved: 2018-12-18

## 2019-04-01 ENCOUNTER — OUTPATIENT (OUTPATIENT)
Dept: OUTPATIENT SERVICES | Facility: HOSPITAL | Age: 79
LOS: 1 days | End: 2019-04-01
Payer: MEDICARE

## 2019-04-01 DIAGNOSIS — Z98.89 OTHER SPECIFIED POSTPROCEDURAL STATES: Chronic | ICD-10-CM

## 2019-04-01 PROCEDURE — G9001: CPT

## 2019-04-08 DIAGNOSIS — Z71.89 OTHER SPECIFIED COUNSELING: ICD-10-CM

## 2019-09-02 PROBLEM — I34.0 NON-RHEUMATIC MITRAL REGURGITATION: Status: ACTIVE | Noted: 2017-11-15

## 2020-02-24 NOTE — ED PROVIDER NOTE - SIGNIFICANT NEGATIVE FINDINGS
Pt arrives from home with headache x few weeks. Pt had CT head approx 2 weeks ago.    no CP, dyspnea

## 2020-09-24 NOTE — ED ADULT NURSE NOTE - NS ED NURSE RECORD ANOTHER VITAL SIGN
Sepsis with acute renal failure and tubular necrosis without septic shock, due to unspecified organism Yes

## 2020-10-09 NOTE — DIETITIAN INITIAL EVALUATION ADULT. - COPY OF WEIGHT IN KG
Spoke to patient. Appointment scheduled for Wed 10/14 at 1pm for left shoulder. Follow up ED. Pt verbalized understanding.  
56.6

## 2020-10-10 NOTE — DISCHARGE NOTE ADULT - VISION (WITH CORRECTIVE LENSES IF THE PATIENT USUALLY WEARS THEM):
F/u Dr Hamilton   10/21/20 @ 8:40am Normal vision: sees adequately in most situations; can see medication labels, newsprint

## 2020-12-10 NOTE — ED PROVIDER NOTE - CPE EDP MUSC NORM
Writer contacted patient and Left VM requesting patient call back to schedule New Patient (consult) visit with Dr. Gatica or Jazmin Donnelly NP. Please offer first available open double time slot (40 or 60 Minutes) if patient returns call.     Thank You.     normal...

## 2021-01-01 NOTE — H&P ADULT - PROBLEM SELECTOR PLAN 7
SCDs given bleeding risk -history of resistant hypertension  -hypertensive without signs of end organ damage  -restart metoprolol, losartan and nifedipine  -avoid dropping blood pressure too quickly Term birth of female  -history of resistant hypertension; BP to 214/86  -hypertensive without signs of end organ damage  -restart metoprolol, losartan and nifedipine  -avoid dropping blood pressure too quickly  -modify therapy, as needed

## 2021-01-11 NOTE — PHYSICAL THERAPY INITIAL EVALUATION ADULT - SHORT TERM MEMORY, REHAB EVAL
Your BP medications were held during your hospitalization. Please follow up with your PMD regarding restarting medication  impaired

## 2021-03-24 NOTE — ED PROVIDER NOTE - SECONDARY DIAGNOSIS.
Physical Exam:    CONSTITUTIONAL:  Generally well appearing, no acute distress, alert, awake and oriented  HEENT:  Moist mucous membranes, normal voice  PULM:  No accessory muscle use, speaking full sentences  SKIN:  Normal in appearance, normal color
Hyperkalemia

## 2021-07-12 NOTE — ED ADULT TRIAGE NOTE - BP NONINVASIVE DIASTOLIC (MM HG)
HISTORY OF PRESENT ILLNESS  Sunny Stone is a 36 y.o. female. HPI ESTABLISHED patient here for post op follow up. 6-22-21  Right breast ultrasound-guided excisional biopsy. She states that everything is healing well and having no pain or changes to the breast area. Pathology- benign       Review of Systems   All other systems reviewed and are negative. Physical Exam  Vitals and nursing note reviewed. Chest:          Comments: Incision healing well         ASSESSMENT and PLAN    ICD-10-CM ICD-9-CM    1.  Breast mass, right  N63.10 611.72      Benign breast changes on path  F/u prn
70

## 2021-10-06 PROBLEM — I10 ESSENTIAL HYPERTENSION: Status: ACTIVE | Noted: 2017-11-15

## 2022-02-10 NOTE — PROVIDER CONTACT NOTE (OTHER) - DATE AND TIME:
18-Mar-2018
17-Mar-2018 06:50
18-Mar-2018 01:30
18-Mar-2018 06:57
19-Mar-2018 08:00
18-Mar-2018 20:18

## 2022-09-14 NOTE — ED ADULT NURSE NOTE - CAS EDP DISCH DISPOSITION ADMI
Problem: At Risk for Falls  Intervention: Risk for Fall Interventions (specify)  Description: Selectively initiate interventions based on patient-specific fall risks. Document in  Associated Rows on Daily Cares.  Flowsheets (Taken 9/14/2022 0000)  Patient's Personal Risk Factors:   Problems with walking or moving   Taking medications that cause drowsiness/problems walking   Potential for overestimating ability  Mobility and Gait Measures:   Collaborate with PT   Encourage personal mobility support item use   Mobilize (Early and progressive ambulation unless contraindicated)   Use gait belt  Altered Mental Status/Unable to Participate Measures:   Bed/chair exit alert   Monitor for needs frequently   Use low height bed   Minimize distractions during ambulation  Elimination Risk Interventions:   Utilize visual cues (e.g. yield sign)   Urinal at bedside  Environmental Safety Measures Maintained: Done  Patient Specific Safety Measures in Use:   Encourage personal sensory support item use   Safe lighting as appropriate      Telemetry

## 2022-10-21 NOTE — CONSULT NOTE ADULT - PROBLEM SELECTOR RECOMMENDATION 3
Elevated BP. Home medications restarted. Monitor BP post HD. If remains elevated. Change Nifedipine to 60mg PO bid. Monitor BP 100

## 2022-11-09 NOTE — ED PROVIDER NOTE - CARE PLAN
Carotid Doppler shows mild to moderate plaque in the left and mild on the right.    Repeat in 1-2 years
Principal Discharge DX:	Abdominal pain, unspecified location

## 2022-12-30 NOTE — ED PROVIDER NOTE - NSCAREINITIATED _GEN_ER
Jone Saha(Resident) Qbrexza Pregnancy And Lactation Text: There is no available data on Qbrexza use in pregnant women.  There is no available data on Qbrexza use in lactation.

## 2023-02-03 NOTE — ED PROVIDER NOTE - CROS ED CONS ALL NEG
c/o SOB since this AM. pt states "I think I need a nebulizer treatment." dry cough noted at this time. denies fevers, cp, dizziness, lightheadedness, body aches, chills, sick contact. no hx of covid vaccines. negative...

## 2023-05-15 NOTE — PROGRESS NOTE ADULT - I WAS PHYSICALLY PRESENT FOR THE KEY PORTIONS OF THE EVALUATION AND MANAGEMENT (E/M) SERVICE PROVIDED.  I AGREE WITH THE ABOVE HISTORY, PHYSICAL, AND PLAN WHICH I HAVE REVIEWED AND EDITED WHERE APPROPRIATE
Refills sent to [facility] pharmacy. Patient advised.   
Statement Selected
Statement Selected

## 2023-06-15 NOTE — DISCHARGE NOTE ADULT - CARE PLAN
Instructions for follow-up, activity and diet:	Continue Aspirin and Plavix. Follow up with Dr. Weiss for further management. Obtain CTA as outpatient Otc Regimen: CeraVe Hydrating Cleanser\\nCeraVe Moisturizer\\nPanOxyl Wash 4% QAM Principal Discharge DX:	CVA (cerebral vascular accident)  Goal:	Optimize return of function to baseline  Instructions for follow-up, activity and diet:	Continue Aspirin and Plavix for 3 weeks then just aspirin 81mg by mouth daily  Follow up with Dr. Weiss Neurology  Secondary Diagnosis:	Facial cellulitis  Instructions for follow-up, activity and diet:	Continue with antibiotics cipro and Doxycycline  until 10/27   Continue cipro ear drops until 10/27  Tylenol prn for pain   Follow up with ID  Secondary Diagnosis:	End stage renal failure on dialysis  Instructions for follow-up, activity and diet:	HD 3x week M-W-F   Epogen as needed  Secondary Diagnosis:	Benign hypertension with ESRD (end-stage renal disease)  Instructions for follow-up, activity and diet:	continue Procardia   continue Metoprolol  Follow up with PMD  Secondary Diagnosis:	S/P coronary artery stent placement  Instructions for follow-up, activity and diet:	Continue on asa daily   Continue on atorvastatin daily   Continue asa daily Detail Level: Zone Principal Discharge DX:	CVA (cerebral vascular accident)  Goal:	Optimize return of function to baseline  Instructions for follow-up, activity and diet:	Continue Aspirin and Plavix for 3 weeks then just aspirin 81mg by mouth daily  Follow up with Dr. Weiss Neurology-- 1 week  Secondary Diagnosis:	Facial cellulitis  Goal:	to prevent future episodes  Instructions for follow-up, activity and diet:	>> with otitis externa;   Continue with antibiotics ciprofloxacin and Doxycycline  until 10/27   Continue cipro ear drops until 10/27  Tylenol prn for pain   Follow up with ID  Secondary Diagnosis:	End stage renal failure on dialysis  Instructions for follow-up, activity and diet:	HD 3x week Tu-Th-Sa    Epogen as needed  Secondary Diagnosis:	Benign hypertension with ESRD (end-stage renal disease)  Instructions for follow-up, activity and diet:	continue Procardia   continue Metoprolol,   Follow up with PMD in 1 week  Secondary Diagnosis:	S/P coronary artery stent placement  Instructions for follow-up, activity and diet:	Continue on atorvastatin daily   Continue Aspirin daily  Follow up with PMD in 1 week Principal Discharge DX:	CVA (cerebral vascular accident)  Goal:	Optimize return of function to baseline  Instructions for follow-up, activity and diet:	Continue Aspirin and Plavix for 3 weeks then just aspirin 81mg by mouth daily  Follow up with Dr. Weiss Neurology-- 1 week  Secondary Diagnosis:	Facial cellulitis  Goal:	to prevent future episodes  Instructions for follow-up, activity and diet:	>> with otitis externa;   Continue with antibiotics ciprofloxacin and Doxycycline  until 10/27   Continue cipro ear drops until 10/27  Tylenol prn for pain   Follow up with ID  Secondary Diagnosis:	End stage renal failure on dialysis  Instructions for follow-up, activity and diet:	HD 3x week Tu-Th-Sa    Epogen as needed, follow up with nephrology within 1 week  Secondary Diagnosis:	Benign hypertension with ESRD (end-stage renal disease)  Instructions for follow-up, activity and diet:	continue Procardia   continue Metoprolol,   Follow up with PMD in 1 week  Secondary Diagnosis:	S/P coronary artery stent placement  Instructions for follow-up, activity and diet:	Continue on atorvastatin daily   Continue Aspirin daily  Follow up with PMD in 1 week

## 2023-08-18 NOTE — ED PROVIDER NOTE - CHPI ED SYMPTOMS NEG
----- Message from Coleamn Ibarra, DO sent at 8/17/2023  5:25 PM CDT -----  Copy placed in triage RN inbox \"needs something done.\" -- Are you able to email a copy of this to the family? Dad needs it for IEP. Thank you!!  
Spoke with father and verified e-mail. Information emailed as requested.  
no pain/no fever

## 2024-05-08 NOTE — DISCHARGE NOTE ADULT - THE PATIENT HAS
Can you please obtain medical records from Cardiologist Dr. Treviño for him? Thanks! no difficulties

## 2024-09-03 NOTE — CONSULT NOTE ADULT - CONSULT REQUESTED DATE/TIME
17-Oct-2017
18-Oct-2017 13:37
26-Oct-2017 16:30
17-Oct-2017 19:13
18-Oct-2017 16:43
17-Oct-2017 18:18
alert and awake

## 2025-04-06 NOTE — PROGRESS NOTE ADULT - ASSESSMENT
77 yo male , with PMHx of CAD, Stent on ASA, HTN, ESRD on HD, DM, PVD , high cholesterol, mild dementia, presented with 3 days of lethargy and weakness (L>R) followed sudden onset of encephalopathy which described as unresponsiveness, during dialysis today as well as worsening left sided weakness. Exam does not show any substantial left sided weakness. CT H is positive for significant white matter disease with progressive tissue swelling over the left frontotemporal region as well as mastoiditis with indistinctness of bony walls with possible necrotizing otitis externa , likely severe otitis externa with surrounding cellulitis and underlying  soft tissue spread of infection, pt is accompanied with his wife, HX mostly obtained from wife, pt is A+O x2, pt c/o left shoulder pain, no CP, no SOB, no cough, no N/V, no dysuria, no HA, no dizziness, no abdominal pain, still making urine, + Left side of face swollen with erythema, pt was seen by ENT, Neuro, renal tonight, s/p IV Zosyn, IV Clinda tonight, no fever was reported, pt is mildly lethargic, but no distress, + Thrombocytopenia as per lab, elevated BP in the ER, S/P PO Clonidine 0.2 mg x 1 given in the ER, Prophylactic measure Chronic obstructive pulmonary disease (COPD) Hypothyroidism

## 2025-07-28 NOTE — PROGRESS NOTE ADULT - SUBJECTIVE AND OBJECTIVE BOX
Problem: Chronic Conditions and Co-morbidities  Goal: Patient's chronic conditions and co-morbidity symptoms are monitored and maintained or improved  Outcome: Progressing      CARDIOLOGY FOLLOW UP - Dr. Flaherty    CC no chest pain or sob        PHYSICAL EXAM:  T(C): 36.8 (10-27-17 @ 06:45), Max: 36.8 (10-26-17 @ 18:54)  HR: 73 (10-27-17 @ 06:45) (71 - 89)  BP: 145/59 (10-27-17 @ 06:45) (145/59 - 164/82)  RR: 18 (10-27-17 @ 06:45) (16 - 18)  SpO2: 98% (10-27-17 @ 06:45) (95% - 100%)  Wt(kg): --  I&O's Summary    26 Oct 2017 07:01  -  27 Oct 2017 07:00  --------------------------------------------------------  IN: 500 mL / OUT: 2200 mL / NET: -1700 mL        Appearance: Normal	  Cardiovascular: Normal S1 S2,RRR, + sys mumur   Respiratory: Lungs clear to auscultation/ diminished at base bl   Gastrointestinal:  Soft, Non-tender, + BS	  Extremities: Normal range of motion, No clubbing, cyanosis or edema        MEDICATIONS  (STANDING):  aspirin enteric coated 81 milliGRAM(s) Oral daily  atorvastatin 80 milliGRAM(s) Oral at bedtime  ciprofloxacin     Tablet 500 milliGRAM(s) Oral every 24 hours  ciprofloxacin/dexamethasone Suspension Otic 4 Drop(s) Left Ear four times a day  clopidogrel Tablet 75 milliGRAM(s) Oral daily  dextrose 5%. 1000 milliLiter(s) (50 mL/Hr) IV Continuous <Continuous>  dextrose 50% Injectable 12.5 Gram(s) IV Push once  dextrose 50% Injectable 25 Gram(s) IV Push once  dextrose 50% Injectable 25 Gram(s) IV Push once  doxycycline hyclate Capsule 100 milliGRAM(s) Oral every 12 hours  epoetin estelle Injectable 3000 Unit(s) IV Push <User Schedule>  heparin  Injectable 5000 Unit(s) SubCutaneous every 8 hours  insulin lispro (HumaLOG) corrective regimen sliding scale   SubCutaneous at bedtime  insulin lispro (HumaLOG) corrective regimen sliding scale   SubCutaneous three times a day before meals  lactobacillus acidophilus 1 Tablet(s) Oral every 12 hours  metoprolol 12.5 milliGRAM(s) Oral two times a day  Nephro-emil 1 Tablet(s) Oral daily  NIFEdipine XL 60 milliGRAM(s) Oral every 12 hours  pantoprazole    Tablet 40 milliGRAM(s) Oral before breakfast      TELEMETRY: NSR with 1st AVB 	    ECG:  	  RADIOLOGY:   DIAGNOSTIC TESTING:  < from: Transesophageal Echocardiogram (10.25.17 @ 17:19) >  ------------------------------------------------------------------------  CONCLUSIONS:  1. Thickened mitral valve leaflets with redundant chordae  of the anterior leaflet. There is intermittent chordal  systolic anterior motion. Intermittent moderate mitral  regurgitation. Vena contracta  about  0.5 cm.  2. Linear strands on the left ventricular side of the  aortic leaflets consistent with Lambl's excresences. Normal  trileaflet aortic valve.  3. Normal size aortic root, arch, and descending thoracic  aorta.  4. Normal left atrium. No leftatrial or left atrial  appendage thrombus.  5. Severe concentric left ventricular hypertrophy.  Prominent basal septum.  6. Normal left ventricular systolic function. No segmental  wall motion abnormalities. Peak left ventricular outflow  tract gradient equals 68 mm Hg, consistent with moderately  severe LVOT obstruction.  7. Normal right ventricular size and function.  8. Agitated saline injection demonstrates no evidence of a  patent foramen ovale.  ------------------------------------------------------------------------    < end of copied text >  [ ]  Catheterization:  [ ] Stress Test:    OTHER: 	    LABS:	 	                                10.8   7.92  )-----------( 202      ( 26 Oct 2017 11:44 )             34.2     10-26    137  |  97<L>  |  33<H>  ----------------------------<  168<H>  5.4<H>   |  25  |  8.64<H>    Ca    7.2<L>      26 Oct 2017 06:45